# Patient Record
Sex: FEMALE | Race: WHITE | Employment: OTHER | URBAN - METROPOLITAN AREA
[De-identification: names, ages, dates, MRNs, and addresses within clinical notes are randomized per-mention and may not be internally consistent; named-entity substitution may affect disease eponyms.]

---

## 2018-04-23 ENCOUNTER — HOSPITAL ENCOUNTER (OUTPATIENT)
Dept: GENERAL RADIOLOGY | Age: 83
Discharge: HOME | End: 2018-04-23
Attending: FAMILY MEDICINE
Payer: MEDICARE

## 2018-04-23 DIAGNOSIS — M25.571 RIGHT ANKLE PAIN: ICD-10-CM

## 2018-04-23 PROCEDURE — 73610 X-RAY EXAM OF ANKLE: CPT

## 2019-01-28 ENCOUNTER — OFFICE VISIT (OUTPATIENT)
Dept: INTERNAL MEDICINE CLINIC | Age: 84
End: 2019-01-28

## 2019-01-28 VITALS
RESPIRATION RATE: 16 BRPM | HEIGHT: 63 IN | WEIGHT: 137.8 LBS | TEMPERATURE: 98.1 F | SYSTOLIC BLOOD PRESSURE: 122 MMHG | HEART RATE: 63 BPM | BODY MASS INDEX: 24.41 KG/M2 | OXYGEN SATURATION: 92 % | DIASTOLIC BLOOD PRESSURE: 74 MMHG

## 2019-01-28 DIAGNOSIS — R41.3 MEMORY LOSS OR IMPAIRMENT: ICD-10-CM

## 2019-01-28 DIAGNOSIS — K27.9 PUD (PEPTIC ULCER DISEASE): ICD-10-CM

## 2019-01-28 DIAGNOSIS — M16.0 ARTHRITIS OF BOTH HIPS: Primary | ICD-10-CM

## 2019-01-28 DIAGNOSIS — Z87.898 H/O: PITUITARY TUMOR: ICD-10-CM

## 2019-01-28 DIAGNOSIS — E78.5 DYSLIPIDEMIA: ICD-10-CM

## 2019-01-28 DIAGNOSIS — I10 HTN, GOAL BELOW 130/80: ICD-10-CM

## 2019-01-28 DIAGNOSIS — R01.1 HEART MURMUR: ICD-10-CM

## 2019-01-28 DIAGNOSIS — K21.9 GERD WITHOUT ESOPHAGITIS: ICD-10-CM

## 2019-01-28 DIAGNOSIS — E23.6 PITUITARY CYST (HCC): ICD-10-CM

## 2019-01-28 PROBLEM — D51.0 PERNICIOUS ANEMIA: Status: ACTIVE | Noted: 2019-01-28

## 2019-01-28 RX ORDER — METOPROLOL TARTRATE 25 MG/1
12.5 TABLET, FILM COATED ORAL 2 TIMES DAILY
COMMUNITY
Start: 2021-01-01 | End: 2021-01-01 | Stop reason: SDUPTHER

## 2019-01-28 RX ORDER — ASPIRIN 81 MG/1
81 TABLET ORAL DAILY
COMMUNITY

## 2019-01-28 RX ORDER — GABAPENTIN 100 MG/1
100 CAPSULE ORAL 3 TIMES DAILY
Qty: 30 CAP | Refills: 0
Start: 2019-01-28 | End: 2019-07-29 | Stop reason: SDUPTHER

## 2019-01-28 RX ORDER — GABAPENTIN 100 MG/1
2 CAPSULE ORAL 3 TIMES DAILY
Refills: 1 | COMMUNITY
Start: 2018-12-12 | End: 2019-01-28 | Stop reason: DRUGHIGH

## 2019-01-28 RX ORDER — DICLOFENAC SODIUM 10 MG/G
GEL TOPICAL AS NEEDED
COMMUNITY
Start: 2018-11-24 | End: 2019-03-25 | Stop reason: SDUPTHER

## 2019-01-28 RX ORDER — OMEPRAZOLE 20 MG/1
1 CAPSULE, DELAYED RELEASE ORAL DAILY
Refills: 5 | COMMUNITY
Start: 2019-01-06 | End: 2019-01-28 | Stop reason: SDUPTHER

## 2019-01-28 RX ORDER — CYANOCOBALAMIN 1000 UG/ML
1 INJECTION, SOLUTION INTRAMUSCULAR; SUBCUTANEOUS
Refills: 1 | COMMUNITY
Start: 2019-01-03 | End: 2019-11-04 | Stop reason: SDUPTHER

## 2019-01-28 RX ORDER — OMEPRAZOLE 20 MG/1
20 CAPSULE, DELAYED RELEASE ORAL DAILY
Qty: 30 CAP | Refills: 5 | Status: SHIPPED | OUTPATIENT
Start: 2019-01-28 | End: 2019-08-12 | Stop reason: SDUPTHER

## 2019-01-28 RX ORDER — MEMANTINE HYDROCHLORIDE 14 MG/1
1 CAPSULE, EXTENDED RELEASE ORAL DAILY
COMMUNITY
Start: 2019-01-01 | End: 2019-03-25 | Stop reason: DRUGHIGH

## 2019-01-28 RX ORDER — FENOFIBRATE 145 MG/1
1 TABLET, COATED ORAL DAILY
COMMUNITY
Start: 2018-01-30 | End: 2019-03-25 | Stop reason: ALTCHOICE

## 2019-01-28 RX ORDER — TRAMADOL HYDROCHLORIDE 50 MG/1
50 TABLET ORAL
Qty: 90 TAB | Refills: 2 | Status: SHIPPED | OUTPATIENT
Start: 2019-01-28 | End: 2019-04-29 | Stop reason: SDUPTHER

## 2019-01-28 RX ORDER — FLUOROURACIL 50 MG/G
1 CREAM TOPICAL 2 TIMES DAILY
Refills: 0 | COMMUNITY
Start: 2019-01-18 | End: 2021-01-01 | Stop reason: ALTCHOICE

## 2019-01-28 RX ORDER — AMLODIPINE AND OLMESARTAN MEDOXOMIL 10; 40 MG/1; MG/1
1 TABLET ORAL DAILY
COMMUNITY
Start: 2018-05-14 | End: 2019-11-04 | Stop reason: ALTCHOICE

## 2019-01-29 PROBLEM — H91.93 BILATERAL HEARING LOSS: Status: ACTIVE | Noted: 2019-01-29

## 2019-01-29 PROBLEM — R41.3 MEMORY LOSS OR IMPAIRMENT: Status: ACTIVE | Noted: 2019-01-29

## 2019-01-29 PROBLEM — K27.9 PUD (PEPTIC ULCER DISEASE): Status: ACTIVE | Noted: 2019-01-29

## 2019-01-29 PROBLEM — Z90.710 H/O: HYSTERECTOMY: Status: ACTIVE | Noted: 2019-01-29

## 2019-01-29 PROBLEM — E23.6 PITUITARY CYST (HCC): Status: ACTIVE | Noted: 2019-01-29

## 2019-01-29 LAB
ALBUMIN SERPL-MCNC: 4.1 G/DL (ref 3.2–4.6)
ALBUMIN/GLOB SERPL: 1.7 {RATIO} (ref 1.2–2.2)
ALP SERPL-CCNC: 49 IU/L (ref 39–117)
ALT SERPL-CCNC: 12 IU/L (ref 0–32)
AST SERPL-CCNC: 30 IU/L (ref 0–40)
BASOPHILS # BLD AUTO: 0 X10E3/UL (ref 0–0.2)
BASOPHILS NFR BLD AUTO: 1 %
BILIRUB SERPL-MCNC: 0.3 MG/DL (ref 0–1.2)
BUN SERPL-MCNC: 23 MG/DL (ref 10–36)
BUN/CREAT SERPL: 22 (ref 12–28)
CALCIUM SERPL-MCNC: 8.9 MG/DL (ref 8.7–10.3)
CHLORIDE SERPL-SCNC: 105 MMOL/L (ref 96–106)
CHOLEST SERPL-MCNC: 120 MG/DL (ref 100–199)
CO2 SERPL-SCNC: 20 MMOL/L (ref 20–29)
CREAT SERPL-MCNC: 1.04 MG/DL (ref 0.57–1)
EOSINOPHIL # BLD AUTO: 0.2 X10E3/UL (ref 0–0.4)
EOSINOPHIL NFR BLD AUTO: 3 %
ERYTHROCYTE [DISTWIDTH] IN BLOOD BY AUTOMATED COUNT: 14.6 % (ref 12.3–15.4)
GLOBULIN SER CALC-MCNC: 2.4 G/DL (ref 1.5–4.5)
GLUCOSE SERPL-MCNC: 84 MG/DL (ref 65–99)
HCT VFR BLD AUTO: 32.9 % (ref 34–46.6)
HDLC SERPL-MCNC: 27 MG/DL
HGB BLD-MCNC: 10.3 G/DL (ref 11.1–15.9)
IMM GRANULOCYTES # BLD AUTO: 0 X10E3/UL (ref 0–0.1)
IMM GRANULOCYTES NFR BLD AUTO: 0 %
INTERPRETATION, 910389: NORMAL
INTERPRETATION: NORMAL
LDLC SERPL CALC-MCNC: 59 MG/DL (ref 0–99)
LYMPHOCYTES # BLD AUTO: 1.2 X10E3/UL (ref 0.7–3.1)
LYMPHOCYTES NFR BLD AUTO: 21 %
MCH RBC QN AUTO: 31.3 PG (ref 26.6–33)
MCHC RBC AUTO-ENTMCNC: 31.3 G/DL (ref 31.5–35.7)
MCV RBC AUTO: 100 FL (ref 79–97)
MONOCYTES # BLD AUTO: 0.4 X10E3/UL (ref 0.1–0.9)
MONOCYTES NFR BLD AUTO: 6 %
NEUTROPHILS # BLD AUTO: 4.1 X10E3/UL (ref 1.4–7)
NEUTROPHILS NFR BLD AUTO: 69 %
PDF IMAGE, 910387: NORMAL
PLATELET # BLD AUTO: 337 X10E3/UL (ref 150–379)
POTASSIUM SERPL-SCNC: 5.1 MMOL/L (ref 3.5–5.2)
PROLACTIN SERPL-MCNC: 22.3 NG/ML (ref 4.8–23.3)
PROT SERPL-MCNC: 6.5 G/DL (ref 6–8.5)
RBC # BLD AUTO: 3.29 X10E6/UL (ref 3.77–5.28)
SODIUM SERPL-SCNC: 141 MMOL/L (ref 134–144)
T4 FREE SERPL-MCNC: 0.9 NG/DL (ref 0.82–1.77)
TRIGL SERPL-MCNC: 171 MG/DL (ref 0–149)
TSH SERPL DL<=0.005 MIU/L-ACNC: 1.26 UIU/ML (ref 0.45–4.5)
VLDLC SERPL CALC-MCNC: 34 MG/DL (ref 5–40)
WBC # BLD AUTO: 6 X10E3/UL (ref 3.4–10.8)

## 2019-01-29 NOTE — PROGRESS NOTES
HISTORY OF PRESENT ILLNESS  Ashley Davis is a 80 y.o. female. HPI  New to me and this practice, comes in to get established. Accompanied by daughter Nadine Morales, phone number [phone], who helps with history. Issues:  1. Arthritis pain, primarily in hips and knees. Had been on Tramadol, two to three a day, however was changed by previous physician to Gabapentin 200 mg three times a day. On this she is more sedated and would like to go back to Tramadol. 2. Short term memory loss, on Namenda XR 14 mg daily since January of 2018. 3. History of heart murmur. Sees Dr. Kaitlin Dias and has had an echo. No recent edema, syncope or significant shortness of breath. 4. History of pituitary surgery, benign cyst resected twice. No recent endocrine evaluation. 5. History of peptic ulcer disease. Cannot tolerated NSAIDs, including Celebrex in the past.  6. History of skin cancers, followed by Dr. Mathew Franco. Topical fluorouracil currently being used around mouth. 7. Occasional dizziness. 8. Hard of hearing. Does wear hearing aids    Preventive Care:  Tetanus given before 2000, Zostavax in 2013, Pneumovax in 2016. Social History:  She lives alone, but daughter Micheal Arteaga lives downstairs from her. She does not smoke. Alcohol -less than one glass per year. She is . Family History:  Father had heart disease and cancer. Mother had a ruptured aortic aneurysm. Review of Systems   Constitutional: Positive for malaise/fatigue. HENT: Positive for hearing loss. Eyes: Positive for blurred vision. Gastrointestinal: Positive for diarrhea. Genitourinary: Positive for frequency. Musculoskeletal: Positive for joint pain. Neurological: Positive for dizziness. Psychiatric/Behavioral: Positive for memory loss. The patient has insomnia. All other systems reviewed and are negative. Physical Exam   Constitutional: She is oriented to person, place, and time.  She appears well-developed and well-nourished. HENT:   Head: Normocephalic and atraumatic. Right Ear: External ear normal.   Left Ear: External ear normal.   Mouth/Throat: Oropharynx is clear and moist.   Hearing aides in place   Eyes: Conjunctivae are normal.   Neck: Normal range of motion. Neck supple. Carotid bruit is not present. No thyromegaly present. Cardiovascular: Normal rate, regular rhythm, S1 normal, S2 normal and intact distal pulses. Murmur heard. Pulmonary/Chest: Effort normal and breath sounds normal. No respiratory distress. She has no wheezes. She has no rales. Abdominal: Soft. She exhibits no distension and no mass. There is no tenderness. Musculoskeletal: She exhibits no edema. Able to get on and off exam table slowly uses a walker   Neurological: She is alert and oriented to person, place, and time. Skin: No rash noted. Psychiatric: She has a normal mood and affect. Her behavior is normal.   Nursing note and vitals reviewed. ASSESSMENT and PLAN  Diagnoses and all orders for this visit:    1. Arthritis of both hips-resuem ultram and begin to titrate off neurontin dec to 1 tid and then 1 qhs   -     traMADol (ULTRAM) 50 mg tablet; Take 1 Tab by mouth every eight (8) hours as needed for Pain. Max Daily Amount: 150 mg.  -     gabapentin (NEURONTIN) 100 mg capsule; Take 1 Cap by mouth three (3) times daily. 2. Memory loss or impairment    3. Heart murmur  Get echo report  4. HTN, goal below 130/80  Stable on meds  5. GERD without esophagitis  -     omeprazole (PRILOSEC) 20 mg capsule; Take 1 Cap by mouth daily. 6. H/O: pituitary tumor  -     TSH 3RD GENERATION  -     T4, FREE  -     PROLACTIN  -     CBC WITH AUTOMATED DIFF    7. Dyslipidemia  -     METABOLIC PANEL, COMPREHENSIVE  -     LIPID PANEL    8. Pituitary cyst ([facility] [address]    9.  PUD (peptic ulcer disease)    Other orders  -     CVD REPORT  -     CKD REPORT

## 2019-03-25 ENCOUNTER — OFFICE VISIT (OUTPATIENT)
Dept: INTERNAL MEDICINE CLINIC | Age: 84
End: 2019-03-25

## 2019-03-25 VITALS
BODY MASS INDEX: 23.92 KG/M2 | TEMPERATURE: 98.2 F | SYSTOLIC BLOOD PRESSURE: 119 MMHG | HEIGHT: 63 IN | DIASTOLIC BLOOD PRESSURE: 70 MMHG | HEART RATE: 65 BPM | WEIGHT: 135 LBS | RESPIRATION RATE: 16 BRPM | OXYGEN SATURATION: 93 %

## 2019-03-25 DIAGNOSIS — R41.3 MEMORY LOSS OR IMPAIRMENT: Primary | ICD-10-CM

## 2019-03-25 DIAGNOSIS — E78.5 DYSLIPIDEMIA, GOAL LDL BELOW 100: ICD-10-CM

## 2019-03-25 DIAGNOSIS — M16.0 ARTHRITIS OF BOTH HIPS: ICD-10-CM

## 2019-03-25 RX ORDER — CLOBETASOL PROPIONATE 0.46 MG/ML
SOLUTION TOPICAL
Refills: 2 | COMMUNITY
Start: 2019-02-19

## 2019-03-25 RX ORDER — MEMANTINE HYDROCHLORIDE 21 MG/1
21 CAPSULE, EXTENDED RELEASE ORAL DAILY
Qty: 90 CAP | Refills: 2 | Status: SHIPPED | OUTPATIENT
Start: 2019-03-25 | End: 2019-12-16 | Stop reason: SDUPTHER

## 2019-03-25 RX ORDER — DICLOFENAC SODIUM 10 MG/G
GEL TOPICAL 4 TIMES DAILY
Qty: 100 G | Refills: 4 | Status: SHIPPED | OUTPATIENT
Start: 2019-03-25 | End: 2019-04-26 | Stop reason: SDUPTHER

## 2019-03-25 RX ORDER — LIDOCAINE HYDROCHLORIDE 20 MG/ML
1 SOLUTION ORAL; TOPICAL
Refills: 1 | COMMUNITY
Start: 2019-03-08 | End: 2020-09-28

## 2019-03-25 NOTE — PROGRESS NOTES
HISTORY OF PRESENT ILLNESS  Pieter Shaw is a 80 y.o. female. HPI  Follow up after establishing with me. Comes in with daughter, Allie Perdue, who helps with history. Issues:  1. Dyslipidemia. Has been on Tricor. I think given age, little benefit. Will stop this. 2. Memory loss. Tolerating Namenda XR 14. Will titrate to the 21 mg for more long term benefit. 3. Back pain. Using two Gabapentin at night, 200 mg, and two Tramadol in the day, as well as occasional Voltaren Gel. We discussed Lidocaine patches OTC as well to help with her back. Review of Systems   Constitutional: Negative for chills, fever and weight loss. Respiratory: Negative for cough, shortness of breath and wheezing. Cardiovascular: Negative for chest pain, palpitations, orthopnea, leg swelling and PND. Gastrointestinal: Negative for constipation, heartburn and nausea. Musculoskeletal: Positive for back pain. Negative for falls and myalgias. Neurological: Negative for dizziness and headaches. Psychiatric/Behavioral: Positive for memory loss. Physical Exam   Constitutional: She appears well-developed and well-nourished. HENT:   Head: Normocephalic and atraumatic. Neck: Normal range of motion. Neck supple. Carotid bruit is not present. No thyromegaly present. Cardiovascular: Normal rate, regular rhythm, S1 normal, S2 normal, normal heart sounds and intact distal pulses. No murmur heard. Pulmonary/Chest: Effort normal and breath sounds normal. No respiratory distress. She has no wheezes. She has no rales. Musculoskeletal: She exhibits edema (trace). Neurological: She is alert. Psychiatric: She has a normal mood and affect. Her behavior is normal.   Nursing note and vitals reviewed. ASSESSMENT and PLAN  Diagnoses and all orders for this visit:    1. Memory loss or impairment  -     memantine ER (NAMENDA XR) 21 mg capsule; Take 1 Cap by mouth daily.     2. Arthritis of both hips  -     diclofenac (VOLTAREN) 1 % gel; Apply  to affected area four (4) times daily.     3. Dyslipidemia, goal LDL below 100      the following changes in treatment are made: stop tricor inc namenda use lidocaine patches

## 2019-04-25 ENCOUNTER — TELEPHONE (OUTPATIENT)
Dept: INTERNAL MEDICINE CLINIC | Age: 84
End: 2019-04-25

## 2019-04-25 DIAGNOSIS — M16.0 ARTHRITIS OF BOTH HIPS: ICD-10-CM

## 2019-04-25 NOTE — TELEPHONE ENCOUNTER
Patient's daughter, Cedrick Hernandez, called to request a new script for Voltaren gel as it is normally written for a 3 month supply to pay the copy once. The recent order was only 1 tube instead of 9 tubes. Can a new script be sent to the mail order pharmacy? Please give daughter update at [phone]. Thanks.

## 2019-04-26 RX ORDER — DICLOFENAC SODIUM 10 MG/G
2 GEL TOPICAL 4 TIMES DAILY
Qty: 800 G | Refills: 1 | Status: SHIPPED | OUTPATIENT
Start: 2019-04-26 | End: 2020-01-29

## 2019-04-26 NOTE — TELEPHONE ENCOUNTER
[facility] Notified patient's daughter of this who voiced understanding.

## 2019-04-29 DIAGNOSIS — M16.0 ARTHRITIS OF BOTH HIPS: ICD-10-CM

## 2019-04-30 ENCOUNTER — TELEPHONE (OUTPATIENT)
Dept: INTERNAL MEDICINE CLINIC | Age: 84
End: 2019-04-30

## 2019-04-30 RX ORDER — TRAMADOL HYDROCHLORIDE 50 MG/1
TABLET ORAL
Qty: 90 TAB | Refills: 2 | OUTPATIENT
Start: 2019-04-30 | End: 2019-06-26 | Stop reason: SDUPTHER

## 2019-04-30 NOTE — TELEPHONE ENCOUNTER
Sarah sousa Patient's [facility]  in [place] states patient script for  Tramadol has instructions stating patient can take 1 tab every 8 hrs but states per patient PCP  ok'd for her to take up to 4 tabs per day , requesting a new script be sent to them if patient is to take up to 4 tabs daily.

## 2019-04-30 NOTE — TELEPHONE ENCOUNTER
Returned call to patient's daughter. Informed her PCP okayed her mother to take up to 3 tabs daily, not 4. Patient's daughter stated she got confused at the pharmacy & understands it is a max of 3 tabs a day. Next fill of Tramadol cannot be picked up until may 8th as last fill was picked up April 10th. Patient's daughter feels the pharmacy did not give her mother the full prescription on April 10th because her mother will be out of meds by May. Advised she will need to contact the pharmacy to discuss as this would be an error on their part.

## 2019-06-26 ENCOUNTER — OFFICE VISIT (OUTPATIENT)
Dept: INTERNAL MEDICINE CLINIC | Age: 84
End: 2019-06-26

## 2019-06-26 VITALS
TEMPERATURE: 98.2 F | SYSTOLIC BLOOD PRESSURE: 139 MMHG | OXYGEN SATURATION: 94 % | RESPIRATION RATE: 16 BRPM | DIASTOLIC BLOOD PRESSURE: 75 MMHG | HEIGHT: 63 IN | HEART RATE: 70 BPM | WEIGHT: 132.6 LBS | BODY MASS INDEX: 23.5 KG/M2

## 2019-06-26 DIAGNOSIS — R41.3 MEMORY LOSS OR IMPAIRMENT: ICD-10-CM

## 2019-06-26 DIAGNOSIS — I10 HTN, GOAL BELOW 130/80: Primary | ICD-10-CM

## 2019-06-26 DIAGNOSIS — M16.0 ARTHRITIS OF BOTH HIPS: ICD-10-CM

## 2019-06-26 RX ORDER — DULOXETIN HYDROCHLORIDE 20 MG/1
20 CAPSULE, DELAYED RELEASE ORAL DAILY
Qty: 30 CAP | Refills: 2 | Status: SHIPPED | OUTPATIENT
Start: 2019-06-26 | End: 2019-08-28 | Stop reason: ALTCHOICE

## 2019-06-26 RX ORDER — TRAMADOL HYDROCHLORIDE 50 MG/1
50 TABLET ORAL
Qty: 90 TAB | Refills: 2 | Status: SHIPPED | OUTPATIENT
Start: 2019-07-30 | End: 2019-08-28 | Stop reason: SDUPTHER

## 2019-06-27 NOTE — PROGRESS NOTES
HISTORY OF PRESENT ILLNESS  Muna Simmons is a 80 y.o. female. HPI  Follow up, med check, accompanied by her daughter. Issues:  1. Ongoing arthritis pain, particularly in hips and back. She is using about two Tramadol a day. She did not feel that the Lidoderm patch was helpful. They are interested in Cymbalta, which they have read about as an option for helping with chronic pain. Did discuss risks and benefits and will start low dose. 2. Hypertension, remains on Amlodipine, Olmesartan 10/40, with Metoprolol 25 b.i.d., and BP looks good. No chest pain. 3. Memory loss, primarily short term. She has tolerated the increased Namenda 21 and does not wish to escalate dose further at this time. She is off of the Fenofibrate. Review of Systems   Constitutional: Negative for chills, fever and weight loss. Respiratory: Negative for cough, shortness of breath and wheezing. Cardiovascular: Negative for chest pain, palpitations, orthopnea, leg swelling and PND. Gastrointestinal: Negative for abdominal pain, constipation, diarrhea, heartburn and nausea. Musculoskeletal: Positive for back pain and joint pain. Negative for falls and myalgias. Neurological: Negative for dizziness and headaches. Psychiatric/Behavioral: Positive for memory loss. Negative for depression. Physical Exam   Constitutional: She is oriented to person, place, and time. She appears well-developed and well-nourished. HENT:   Head: Normocephalic and atraumatic. Right Ear: External ear normal.   Left Ear: External ear normal.   Mouth/Throat: Oropharynx is clear and moist.   Eyes: Pupils are equal, round, and reactive to light. Neck: Normal range of motion. Neck supple. Carotid bruit is not present. No thyromegaly present. Cardiovascular: Normal rate, regular rhythm, S1 normal, S2 normal, normal heart sounds and intact distal pulses. No murmur heard.   Pulmonary/Chest: Effort normal and breath sounds normal. No respiratory distress. She has no wheezes. She has no rales. Musculoskeletal: She exhibits no edema. Neurological: She is alert and oriented to person, place, and time. Skin: No rash noted. Psychiatric: She has a normal mood and affect. Her behavior is normal.   Nursing note and vitals reviewed. ASSESSMENT and PLAN  Diagnoses and all orders for this visit:    1. HTN, goal below 130/80-stable on meds    2. Arthritis of both hips  -     traMADol (ULTRAM) 50 mg tablet; Take 1 Tab by mouth every eight (8) hours as needed for Pain for up to 30 days. Max Daily Amount: 150 mg.  -     DULoxetine (CYMBALTA) 20 mg capsule; Take 1 Cap by mouth daily.     3. Memory loss or impairment-cont namenda      the following changes in treatment are made: start on cymbalta and appt in 3 mo  Current pain 5-10 level will see if we can dec overall level  Side effects possible reviewed in detail

## 2019-07-29 DIAGNOSIS — M16.0 ARTHRITIS OF BOTH HIPS: ICD-10-CM

## 2019-07-29 NOTE — TELEPHONE ENCOUNTER
----- Message from Ronan Hackett sent at 7/29/2019  1:00 PM EDT -----  Regarding: Dr. Juana Huang  Env Medication Refill    Caller (if not patient): Abena Deshpande       Relationship of caller (if not patient): Daughter      Best contact number(s): [phone]      Name of medication and dosage if known: Gabapentin 100mg       Is patient out of this medication (yes/no): Yes since 07/24/2019      Pharmacy name: [facility] listed in chart? (yes/no): Yes   Pharmacy phone number: [phone]      Details to clarify the request:      Ronan Hackett

## 2019-07-30 RX ORDER — GABAPENTIN 100 MG/1
200 CAPSULE ORAL
Qty: 60 CAP | Refills: 2 | OUTPATIENT
Start: 2019-07-30 | End: 2019-11-04 | Stop reason: SDUPTHER

## 2019-08-12 DIAGNOSIS — K21.9 GERD WITHOUT ESOPHAGITIS: ICD-10-CM

## 2019-08-12 RX ORDER — OMEPRAZOLE 20 MG/1
CAPSULE, DELAYED RELEASE ORAL
Qty: 30 CAP | Refills: 5 | Status: SHIPPED | OUTPATIENT
Start: 2019-08-12 | End: 2020-02-22

## 2019-08-26 ENCOUNTER — TELEPHONE (OUTPATIENT)
Dept: INTERNAL MEDICINE CLINIC | Age: 84
End: 2019-08-26

## 2019-08-26 NOTE — TELEPHONE ENCOUNTER
Notified Alayna Agustin PCP needs to evaluate her mother's back pain before providing orders for PT. Alayna Agustin agreed & appt scheduled for 8/28 at 10:15 with PCP.

## 2019-08-28 ENCOUNTER — OFFICE VISIT (OUTPATIENT)
Dept: INTERNAL MEDICINE CLINIC | Age: 84
End: 2019-08-28

## 2019-08-28 VITALS
DIASTOLIC BLOOD PRESSURE: 67 MMHG | OXYGEN SATURATION: 93 % | WEIGHT: 133 LBS | BODY MASS INDEX: 23.57 KG/M2 | TEMPERATURE: 98.1 F | HEIGHT: 63 IN | RESPIRATION RATE: 16 BRPM | HEART RATE: 61 BPM | SYSTOLIC BLOOD PRESSURE: 108 MMHG

## 2019-08-28 DIAGNOSIS — R41.3 MEMORY LOSS OR IMPAIRMENT: ICD-10-CM

## 2019-08-28 DIAGNOSIS — M16.0 ARTHRITIS OF BOTH HIPS: Primary | ICD-10-CM

## 2019-08-28 DIAGNOSIS — I10 HTN, GOAL BELOW 130/80: ICD-10-CM

## 2019-08-28 RX ORDER — TRAMADOL HYDROCHLORIDE 50 MG/1
50 TABLET ORAL
Qty: 90 TAB | Refills: 2 | Status: SHIPPED | OUTPATIENT
Start: 2019-08-28 | End: 2020-02-26

## 2019-08-28 NOTE — PROGRESS NOTES
HISTORY OF PRESENT ILLNESS  Kash Carlos is a 80 y.o. female. HPI  Follow up with her daughter. She tried Cymbalta briefly, but did not like it, felt it was not helping, so stopped it. She is now using Tramadol, two a day, but would like to try three a day. The order has been written this way. She is not getting constipated. She is still on Gabapentin 200 mg only at night. Aware of sedation. Her BP is well controlled on the current Amlodipine, Olmesartan combo with Metoprolol. She is not having cardiac symptoms. She is enjoying coloring. Review of Systems   Constitutional: Negative for chills, fever and weight loss. Respiratory: Negative for cough, shortness of breath and wheezing. Cardiovascular: Negative for chest pain, palpitations, orthopnea, leg swelling and PND. Gastrointestinal: Negative for heartburn and nausea. Musculoskeletal: Positive for joint pain. Negative for falls and myalgias. Neurological: Negative for dizziness and headaches. Psychiatric/Behavioral: Positive for memory loss. Physical Exam   Constitutional: She appears well-developed and well-nourished. HENT:   Head: Normocephalic and atraumatic. Neck: Normal range of motion. Neck supple. Carotid bruit is not present. No thyromegaly present. Cardiovascular: Normal rate, regular rhythm, S1 normal, S2 normal, normal heart sounds and intact distal pulses. No murmur heard. Pulmonary/Chest: Effort normal and breath sounds normal. No respiratory distress. She has no wheezes. She has no rales. Musculoskeletal: She exhibits no edema. Neurological: She is alert. Psychiatric: She has a normal mood and affect. Her behavior is normal.   Nursing note and vitals reviewed. ASSESSMENT and PLAN  Diagnoses and all orders for this visit:    1. Arthritis of both hips  -     traMADol (ULTRAM) 50 mg tablet; Take 1 Tab by mouth every eight (8) hours as needed for Pain for up to 30 days.  Max Daily Amount: 150 mg.    2. Memory loss or impairment    3.  HTN, goal below 130/80    cpnt bp meds  appt in 3mo

## 2019-11-04 ENCOUNTER — OFFICE VISIT (OUTPATIENT)
Dept: INTERNAL MEDICINE CLINIC | Age: 84
End: 2019-11-04

## 2019-11-04 VITALS
TEMPERATURE: 98.8 F | WEIGHT: 134 LBS | RESPIRATION RATE: 16 BRPM | SYSTOLIC BLOOD PRESSURE: 154 MMHG | OXYGEN SATURATION: 93 % | HEIGHT: 63 IN | BODY MASS INDEX: 23.74 KG/M2 | DIASTOLIC BLOOD PRESSURE: 81 MMHG | HEART RATE: 71 BPM

## 2019-11-04 DIAGNOSIS — R41.3 MEMORY LOSS OR IMPAIRMENT: ICD-10-CM

## 2019-11-04 DIAGNOSIS — M54.9 CHRONIC BACK PAIN, UNSPECIFIED BACK LOCATION, UNSPECIFIED BACK PAIN LATERALITY: ICD-10-CM

## 2019-11-04 DIAGNOSIS — G89.29 CHRONIC BACK PAIN, UNSPECIFIED BACK LOCATION, UNSPECIFIED BACK PAIN LATERALITY: ICD-10-CM

## 2019-11-04 DIAGNOSIS — I10 HTN, GOAL BELOW 130/80: Primary | ICD-10-CM

## 2019-11-04 DIAGNOSIS — K27.9 PUD (PEPTIC ULCER DISEASE): ICD-10-CM

## 2019-11-04 DIAGNOSIS — D51.0 PERNICIOUS ANEMIA: ICD-10-CM

## 2019-11-04 DIAGNOSIS — M16.0 ARTHRITIS OF BOTH HIPS: ICD-10-CM

## 2019-11-04 DIAGNOSIS — G89.29 OTHER CHRONIC PAIN: ICD-10-CM

## 2019-11-04 DIAGNOSIS — Z23 ENCOUNTER FOR IMMUNIZATION: ICD-10-CM

## 2019-11-04 RX ORDER — AMLODIPINE BESYLATE 10 MG/1
10 TABLET ORAL DAILY
Qty: 30 TAB | Refills: 4 | Status: SHIPPED | OUTPATIENT
Start: 2019-11-04 | End: 2020-04-13 | Stop reason: SDUPTHER

## 2019-11-04 RX ORDER — GABAPENTIN 100 MG/1
200 CAPSULE ORAL
Qty: 60 CAP | Refills: 5 | Status: SHIPPED | OUTPATIENT
Start: 2019-11-04 | End: 2019-11-25 | Stop reason: SDUPTHER

## 2019-11-04 RX ORDER — OLMESARTAN MEDOXOMIL 40 MG/1
40 TABLET ORAL DAILY
Qty: 30 TAB | Refills: 5 | Status: SHIPPED | OUTPATIENT
Start: 2019-11-04 | End: 2020-04-13 | Stop reason: SDUPTHER

## 2019-11-04 RX ORDER — CYANOCOBALAMIN 1000 UG/ML
1000 INJECTION, SOLUTION INTRAMUSCULAR; SUBCUTANEOUS
Qty: 1 VIAL | Refills: 5
Start: 2019-11-04 | End: 2019-11-08 | Stop reason: SDUPTHER

## 2019-11-04 NOTE — Clinical Note
She needs home pt for back pain and prefers [facility] which has worked with her in past thanks

## 2019-11-04 NOTE — PROGRESS NOTES
HISTORY OF PRESENT ILLNESS  Dee Dee Lucas is a 80 y.o. female. HPI  Seen accompanied by her daughter, who helps with history as she does have memory impairment. Issues:    1. Dizziness. Has been using Meclizine daily. She has been apparently off of her Selvin, (Amlodipine/Benicar combination), for several weeks and blood pressure has been running high in my office. She is not having chest pain or shortness of breath. We are going to resume the blood pressure med. 2. Chronic back pain, seems to be progressing and she is less mobile. She is now willing to try PT at home. Has worked with [facility]. Needs refill on the Gabapentin 200 at night and uses Tramadol in the day with Tylenol. 3. Pernicious anemia. Needs refill on her B12 shot. Review of Systems   Constitutional: Positive for malaise/fatigue. Negative for chills, fever and weight loss. Respiratory: Negative for cough, shortness of breath and wheezing. Cardiovascular: Negative for chest pain, palpitations, orthopnea, leg swelling and PND. Gastrointestinal: Negative for heartburn and nausea. Musculoskeletal: Positive for back pain and joint pain. Negative for falls and myalgias. Neurological: Positive for dizziness. Negative for sensory change, focal weakness and headaches. Psychiatric/Behavioral: Positive for memory loss. Physical Exam   Constitutional: She appears well-developed and well-nourished. In a wheelchair frail in nad   HENT:   Head: Normocephalic and atraumatic. Neck: Normal range of motion. Neck supple. Carotid bruit is not present. No thyromegaly present. Cardiovascular: Normal rate, regular rhythm, S1 normal, S2 normal, normal heart sounds and intact distal pulses. No murmur heard. Pulmonary/Chest: Effort normal and breath sounds normal. No respiratory distress. She has no wheezes. She has no rales. Musculoskeletal: She exhibits no edema. Neurological: She is alert. Skin: No rash noted. Psychiatric: She has a normal mood and affect. Her behavior is normal.   Nursing note and vitals reviewed. ASSESSMENT and PLAN  Diagnoses and all orders for this visit:    1. HTN, goal below 130/80  -     amLODIPine (NORVASC) 10 mg tablet; Take 1 Tab by mouth daily. -     olmesartan (BENICAR) 40 mg tablet; Take 1 Tab by mouth daily. 2. Memory loss or impairment-cont namenda    3. PUD (peptic ulcer disease)    4. Other chronic pain-refer for home pt with [facility]    5. Arthritis of both hips  -     gabapentin (NEURONTIN) 100 mg capsule; Take 2 Caps by mouth nightly. Max Daily Amount: 200 mg.    6. Pernicious anemia  -     cyanocobalamin (VITAMIN B12) 1,000 mcg/mL injection; 1 mL by IntraMUSCular route every seven (7) days.

## 2019-11-08 DIAGNOSIS — D51.0 PERNICIOUS ANEMIA: ICD-10-CM

## 2019-11-08 RX ORDER — CYANOCOBALAMIN 1000 UG/ML
1000 INJECTION, SOLUTION INTRAMUSCULAR; SUBCUTANEOUS
Qty: 1 VIAL | Refills: 5 | Status: SHIPPED | OUTPATIENT
Start: 2019-11-08 | End: 2021-01-01 | Stop reason: SDUPTHER

## 2019-11-08 NOTE — TELEPHONE ENCOUNTER
Patient's daughter states [facility] has not received her b12 inj refill, requesting the script be re-sent.

## 2019-11-21 ENCOUNTER — TELEPHONE (OUTPATIENT)
Dept: INTERNAL MEDICINE CLINIC | Age: 84
End: 2019-11-21

## 2019-11-21 DIAGNOSIS — M62.838 MUSCLE SPASM: Primary | ICD-10-CM

## 2019-11-21 NOTE — TELEPHONE ENCOUNTER
----- Message from Vandana Alcocer sent at 11/21/2019  2:45 PM EST -----  Regarding: Dr. Hedrick Co: [phone]  Caller's first and last name and relationship (if not the patient): [address] [place] [address],[address] [place] [address] contact number(s): [phone]  Whose call is being returned: n/a  Details to clarify the request:  Pt would like nurse to give her a call. Physical therapist just left and Pt is having muscle spasms and would like to take Gabapentin more or if she can take a muscle relaxer.

## 2019-11-21 NOTE — TELEPHONE ENCOUNTER
Pt's daughter states Pt took gabapentin 100 mg at 1pm today d/t worsening muscle spasms. She was not able to participate in PT today. Left detailed VM advising Ms. Preston Cruz as per Dr Fabrice Mancera that Pt can take Tramadol if she hasn't taken it today and may take gabapentin 200 mg tonight as it can be taken up to 3x daily. Also recommended applying heat. Advised Ms. Preston Cruz message would be forwarded to Dr Mikey Glynn and her nurse to f/u tomorrow.

## 2019-11-22 RX ORDER — TIZANIDINE 2 MG/1
2 TABLET ORAL
Qty: 25 TAB | Refills: 0 | Status: SHIPPED | OUTPATIENT
Start: 2019-11-22 | End: 2019-11-25 | Stop reason: ALTCHOICE

## 2019-11-22 NOTE — TELEPHONE ENCOUNTER
Rosemarie states pt spasms are still severe after taking both medications. Requesting muscle relaxers as they have helped before. Thanks.

## 2019-11-22 NOTE — TELEPHONE ENCOUNTER
Reached out to Surjit, patient's daughter, to check on her mother's status. Rosemarie stated her mother has just woke up so she will call back with a report on how her mother is feeling.

## 2019-11-22 NOTE — TELEPHONE ENCOUNTER
Notified Rosemarie PCP approved a Zanaflex prescription for her mother to use at bedtime as needed. Warned against the sedating effects the med can have which can increase her risk for falls. Advised can still use the Gabapentin as well. Rosemarie voiced understanding.

## 2019-11-22 NOTE — TELEPHONE ENCOUNTER
So I sent in tizanidine to use qhs prn-warn dt of risk of sedation and falls with muscle relaxers and see me in dec as planned to eval response

## 2019-11-24 ENCOUNTER — TELEPHONE (OUTPATIENT)
Dept: INTERNAL MEDICINE CLINIC | Age: 84
End: 2019-11-24

## 2019-11-25 ENCOUNTER — TELEPHONE (OUTPATIENT)
Dept: INTERNAL MEDICINE CLINIC | Age: 84
End: 2019-11-25

## 2019-11-25 ENCOUNTER — OFFICE VISIT (OUTPATIENT)
Dept: INTERNAL MEDICINE CLINIC | Age: 84
End: 2019-11-25

## 2019-11-25 VITALS
SYSTOLIC BLOOD PRESSURE: 188 MMHG | WEIGHT: 126.2 LBS | BODY MASS INDEX: 22.36 KG/M2 | HEART RATE: 68 BPM | OXYGEN SATURATION: 95 % | RESPIRATION RATE: 16 BRPM | TEMPERATURE: 97.7 F | HEIGHT: 63 IN | DIASTOLIC BLOOD PRESSURE: 79 MMHG

## 2019-11-25 DIAGNOSIS — I10 HTN, GOAL BELOW 130/80: Primary | ICD-10-CM

## 2019-11-25 DIAGNOSIS — M16.0 ARTHRITIS OF BOTH HIPS: ICD-10-CM

## 2019-11-25 DIAGNOSIS — M48.061 FORAMINAL STENOSIS OF LUMBAR REGION: ICD-10-CM

## 2019-11-25 DIAGNOSIS — M62.838 MUSCLE SPASM: ICD-10-CM

## 2019-11-25 DIAGNOSIS — R41.3 MEMORY LOSS OR IMPAIRMENT: ICD-10-CM

## 2019-11-25 RX ORDER — PREDNISONE 20 MG/1
20 TABLET ORAL
Qty: 7 TAB | Refills: 0 | Status: SHIPPED | OUTPATIENT
Start: 2019-11-25 | End: 2019-12-16 | Stop reason: ALTCHOICE

## 2019-11-25 RX ORDER — GABAPENTIN 100 MG/1
100 CAPSULE ORAL 2 TIMES DAILY
Qty: 60 CAP | Refills: 3 | Status: SHIPPED | OUTPATIENT
Start: 2019-11-25 | End: 2019-11-26 | Stop reason: SDUPTHER

## 2019-11-25 NOTE — PROGRESS NOTES
HISTORY OF PRESENT ILLNESS  Caleb Choudhary is a 80 y.o. female. DEE Hernadez is worked in because of back pain. It is bilateral in thoracic and lumbar region and some into left hip. No weakness in foot, no falls. She is accompanied by her daughter. Over the weekend because of increasing pain she has been using two Gabapentin 100 mg t.i.d. with Tramadol t.i.d., and also taking Tizanidine every six hours. Does not feel any of this has really helped with her pain. She has not had fevers, chills or dysuria. She does have memory impairment. Family is helping her with her meds. She has recently started with home PT and daughter notes that after some exercises the pain seems to have escalated. Home PT has just started. They are using a topical Voltaren Gel as well, which Ms. Hollins says helps. She stopped her blood pressure pills because her stomach was bothering her and blood pressure is quite high today. She is not having chest pain. She does remain on Namenda. Review of Systems   Constitutional: Negative for chills, diaphoresis, fever and weight loss. Cardiovascular: Negative for chest pain. Gastrointestinal: Negative for abdominal pain, blood in stool, constipation, nausea and vomiting. Genitourinary: Negative for dysuria, frequency and hematuria. Musculoskeletal: Positive for back pain and joint pain. Negative for falls. Neurological: Negative for sensory change and focal weakness. Psychiatric/Behavioral: Positive for memory loss. Physical Exam  Vitals signs and nursing note reviewed. Constitutional:       Appearance: She is well-developed. Comments: Fragile and elderly in chair able to stand    HENT:      Head: Normocephalic and atraumatic. Neck:      Musculoskeletal: Normal range of motion and neck supple. Thyroid: No thyromegaly. Vascular: No carotid bruit. Cardiovascular:      Rate and Rhythm: Normal rate and regular rhythm.       Heart sounds: Normal heart sounds, S1 normal and S2 normal. No murmur. Pulmonary:      Effort: Pulmonary effort is normal. No respiratory distress. Breath sounds: Normal breath sounds. No wheezing or rales. Musculoskeletal:      Comments: Pain in bilat thoracic and lumbar regions able to stand   Skin:     General: Skin is warm and dry. Findings: No rash. Neurological:      Mental Status: She is alert. Psychiatric:         Behavior: Behavior normal.         ASSESSMENT and PLAN  Diagnoses and all orders for this visit:    1. HTN, goal below 130/80-off meds and bp high strongly advised resume her bp meds appt in 2 weeks    2. Muscle spasm-I am worried re her fall  Risk with current combination of meds  Stop tizanidine and use ultram only prn and use prednisone daily for inflammation  Discussed I think the underlying djd in spine is driving pain   Cont massage but hold on active exercises with pt    3. Memory loss or impairment    4. Foraminal stenosis of lumbar region  -     predniSONE (DELTASONE) 20 mg tablet; Take 20 mg by mouth daily (with breakfast). -     gabapentin (NEURONTIN) 100 mg capsule; Take 1 Cap by mouth two (2) times a day.  Max Daily Amount: 200 mg.    5. Arthritis of both hips    Discussed all meds with her dt

## 2019-11-25 NOTE — TELEPHONE ENCOUNTER
Received and returned page from daughter 10:44am.  Reports that the tizanidine that was called in earlier this week is not doing enough for her mother's pain. Reports that she previously was given valium, but I told her that I did not feel comfortable writing for that over the phone. Recommended increasing tizanidine up to 3 times a day, being mindful of sedation. If it is still not controlled, could go up to 4mg each dose. Instructed to call back if pain still not well controlled. Daughter verbalized understanding.

## 2019-11-25 NOTE — TELEPHONE ENCOUNTER
Patient's daughter returned call to the nurse , states patient has decided to lay down for a little while and if her pain does not subside then they are leaning towards taking her to the [facility] ER , believes PCP will send her to the ER and wants to avoid two visits if possible.

## 2019-11-25 NOTE — TELEPHONE ENCOUNTER
Reached out to Rosemarie to check on the status of her mother. She states her mother has not yet gotten up so she will call back with a full report. She did note that the on-call provider advised to increase the Zanaflex to 4 mg & this seem to help some with the pain. Rosemarie also advised per MD Burton's instructions from last week her mother has been taking Gabapentin 100 mg 2 pills TID. The current Rx on file is written for 2 pills at night. Rosemarie advised she may need a new Rx sent to the pharmacy as her current supply is getting low.

## 2019-11-25 NOTE — TELEPHONE ENCOUNTER
V/m left for Cindy to follow-up & see how her mother was doing. Asked If she could leave a message & update us on her mother's pain.

## 2019-11-25 NOTE — PATIENT INSTRUCTIONS
Please stop the tizanidine.   Use the gabapentin 100 mg twice a day  Add prednisone 20 mg in am with breakfast  Ultram only use as needed if tylenol does not work

## 2019-11-26 DIAGNOSIS — M48.061 FORAMINAL STENOSIS OF LUMBAR REGION: ICD-10-CM

## 2019-11-26 RX ORDER — GABAPENTIN 100 MG/1
200 CAPSULE ORAL
Qty: 42 CAP | Refills: 0 | Status: SHIPPED | OUTPATIENT
Start: 2019-11-26 | End: 2019-12-16 | Stop reason: SDUPTHER

## 2019-11-26 NOTE — TELEPHONE ENCOUNTER
Patient's daughter called to report that patient was advised by covering provider to double up on Neurontin medication. Daughter reports that patient is completely out of medication and unable to fill same strength for the next 7 days. Daughter is requesting a 7 day supply for patient of another strength to help bridge medication and get through the holiday. Please call daughter  To advise.    [phone]    [facility]  [phone]

## 2019-12-10 DIAGNOSIS — M62.838 MUSCLE SPASM: ICD-10-CM

## 2019-12-10 RX ORDER — TIZANIDINE 2 MG/1
2 TABLET ORAL
Qty: 25 TAB | Refills: 0 | OUTPATIENT
Start: 2019-12-10

## 2019-12-10 NOTE — TELEPHONE ENCOUNTER
I denied tizanidine because we stopped it at last visit and jefferson for neurontin and ultram in place she felt the tizanidine did nto help

## 2019-12-16 ENCOUNTER — OFFICE VISIT (OUTPATIENT)
Dept: INTERNAL MEDICINE CLINIC | Age: 84
End: 2019-12-16

## 2019-12-16 VITALS
WEIGHT: 130 LBS | HEIGHT: 63 IN | DIASTOLIC BLOOD PRESSURE: 67 MMHG | OXYGEN SATURATION: 93 % | RESPIRATION RATE: 16 BRPM | HEART RATE: 60 BPM | BODY MASS INDEX: 23.04 KG/M2 | TEMPERATURE: 98.1 F | SYSTOLIC BLOOD PRESSURE: 116 MMHG

## 2019-12-16 DIAGNOSIS — M48.061 FORAMINAL STENOSIS OF LUMBAR REGION: ICD-10-CM

## 2019-12-16 DIAGNOSIS — I10 HTN, GOAL BELOW 130/80: Primary | ICD-10-CM

## 2019-12-16 DIAGNOSIS — R41.3 MEMORY LOSS OR IMPAIRMENT: ICD-10-CM

## 2019-12-16 RX ORDER — GABAPENTIN 100 MG/1
CAPSULE ORAL
Qty: 90 CAP | Refills: 5 | Status: SHIPPED | OUTPATIENT
Start: 2019-12-16 | End: 2020-06-17 | Stop reason: SDUPTHER

## 2019-12-16 RX ORDER — MEMANTINE HYDROCHLORIDE 21 MG/1
21 CAPSULE, EXTENDED RELEASE ORAL DAILY
Qty: 90 CAP | Refills: 2 | Status: SHIPPED | OUTPATIENT
Start: 2019-12-16 | End: 2020-10-01

## 2019-12-16 NOTE — PROGRESS NOTES
HISTORY OF PRESENT ILLNESS  Sabine Byers is a 80 y.o. female. HPI  Follow up, issues:  1. Chronic back pain with foraminal stenosis, slightly improved with Gabapentin 100 b.i.d. She has more trouble at night. We are going to bump to 100 in the morning, 200 at bedtime. No edema. 2. Memory loss, stable on Namenda 21. Would like a refill. 3. Hypertension, on Benicar. No cardiac symptoms, no chest pain or headache. BP is good. Review of Systems   Constitutional: Negative for chills, fever and weight loss. Respiratory: Negative for cough, shortness of breath and wheezing. Cardiovascular: Negative for chest pain, palpitations, orthopnea, leg swelling and PND. Gastrointestinal: Negative for abdominal pain, heartburn and nausea. Musculoskeletal: Positive for back pain. Negative for myalgias. Neurological: Negative for dizziness and headaches. Psychiatric/Behavioral: Positive for memory loss. Negative for depression. The patient does not have insomnia. Physical Exam  Vitals signs and nursing note reviewed. Constitutional:       Appearance: She is well-developed. Comments: In wheelchair fragile in nad   HENT:      Head: Normocephalic and atraumatic. Neck:      Musculoskeletal: Normal range of motion and neck supple. Thyroid: No thyromegaly. Vascular: No carotid bruit. Cardiovascular:      Rate and Rhythm: Normal rate and regular rhythm. Heart sounds: Normal heart sounds, S1 normal and S2 normal. No murmur. Pulmonary:      Effort: Pulmonary effort is normal. No respiratory distress. Breath sounds: Normal breath sounds. No wheezing or rales. Neurological:      Mental Status: She is alert and oriented to person, place, and time. Psychiatric:         Behavior: Behavior normal.         ASSESSMENT and PLAN  Diagnoses and all orders for this visit:    1. HTN, goal below 130/80-stable on med    2.  Foraminal stenosis of lumbar region  -     gabapentin (NEURONTIN) 100 mg capsule; 1 po qam and 2 qhs    3. Memory loss or impairment  -     memantine ER (NAMENDA XR) 21 mg capsule; Take 1 Cap by mouth daily.       the following changes in treatment are made: inc dose of gabapentin  Side effects possible reviewed in detail  A[p[tin 4mo

## 2020-01-29 DIAGNOSIS — M16.0 ARTHRITIS OF BOTH HIPS: ICD-10-CM

## 2020-01-29 RX ORDER — DICLOFENAC SODIUM 10 MG/G
4 GEL TOPICAL 4 TIMES DAILY
Qty: 800 G | Refills: 3 | Status: SHIPPED | OUTPATIENT
Start: 2020-01-29 | End: 2020-12-13

## 2020-01-29 RX ORDER — DICLOFENAC SODIUM 10 MG/G
GEL TOPICAL
Qty: 800 G | Refills: 3 | Status: SHIPPED | OUTPATIENT
Start: 2020-01-29 | End: 2020-01-29 | Stop reason: SDUPTHER

## 2020-02-07 ENCOUNTER — TELEPHONE (OUTPATIENT)
Dept: INTERNAL MEDICINE CLINIC | Age: 85
End: 2020-02-07

## 2020-02-07 NOTE — TELEPHONE ENCOUNTER
Patient's sister is requesting a script for meclizine to help her with her dizzy spells, states patient has been purchasing the medication OTC for $70 but with a script it will only cost $8. States patient has one pill left. Aware PCP is out of the office until Tuesday . Hoping another provider can review and approve.

## 2020-02-09 NOTE — TELEPHONE ENCOUNTER
Meclizine has risks in a 79 yo- I need to see her if she is really dizzy and can evaluate better tatatyana

## 2020-02-22 DIAGNOSIS — K21.9 GERD WITHOUT ESOPHAGITIS: ICD-10-CM

## 2020-02-22 RX ORDER — OMEPRAZOLE 20 MG/1
CAPSULE, DELAYED RELEASE ORAL
Qty: 30 CAP | Refills: 5 | Status: SHIPPED | OUTPATIENT
Start: 2020-02-22 | End: 2020-08-23

## 2020-02-25 DIAGNOSIS — M16.0 ARTHRITIS OF BOTH HIPS: ICD-10-CM

## 2020-02-26 RX ORDER — TRAMADOL HYDROCHLORIDE 50 MG/1
50 TABLET ORAL
Qty: 60 TAB | Refills: 0 | Status: SHIPPED | OUTPATIENT
Start: 2020-02-26 | End: 2020-03-23 | Stop reason: SDUPTHER

## 2020-02-28 ENCOUNTER — TELEPHONE (OUTPATIENT)
Dept: INTERNAL MEDICINE CLINIC | Age: 85
End: 2020-02-28

## 2020-02-28 NOTE — TELEPHONE ENCOUNTER
Per patient's daughter, patient's new script for Tramadol has not yet been received by patient's [facility] pharmacy , PSR confirmed a new script was sent in on 02/26 , requesting the script be sent in again .      [facility] would like a call back to confirm when it has been sent in at [phone]

## 2020-02-28 NOTE — TELEPHONE ENCOUNTER
Per pharmacy the Rx was received but because it was not picked up it was put back in stock. Notified the patient's daughter of this & to check with the pharmacy on the status. Cindy voiced understanding.

## 2020-03-23 DIAGNOSIS — M16.0 ARTHRITIS OF BOTH HIPS: ICD-10-CM

## 2020-03-23 RX ORDER — TRAMADOL HYDROCHLORIDE 50 MG/1
50 TABLET ORAL
Qty: 90 TAB | Refills: 1
Start: 2020-03-23 | End: 2021-01-01

## 2020-03-23 RX ORDER — TRAMADOL HYDROCHLORIDE 50 MG/1
50 TABLET ORAL
Qty: 90 TAB | Refills: 0 | Status: CANCELLED | OUTPATIENT
Start: 2020-03-23 | End: 2020-04-22

## 2020-03-23 NOTE — TELEPHONE ENCOUNTER
PCP was unable to send updated Rx electronically so Rx called into pharmacy on file left on v/m. Notified Erik Maker of this who voiced understanding. Also notified mom can skip her b12 injection this month.

## 2020-03-23 NOTE — TELEPHONE ENCOUNTER
Daughter, Sil Mancilla, is requesting a new script for Tramadol 50mg as the current directions are incorrect. It needs to read \"take 1 tablet every 8 hours\". Please send to [email or web address]). Also, pt wants to know if she should come in for her B12 shot or take an oral B12 until the coronavirus calms down. Please call daughter to confirm. Thanks.

## 2020-04-13 DIAGNOSIS — I10 HTN, GOAL BELOW 130/80: ICD-10-CM

## 2020-04-23 ENCOUNTER — TELEPHONE (OUTPATIENT)
Dept: INTERNAL MEDICINE CLINIC | Age: 85
End: 2020-04-23

## 2020-04-23 RX ORDER — OLMESARTAN MEDOXOMIL 40 MG/1
40 TABLET ORAL DAILY
Qty: 30 TAB | Refills: 1 | Status: SHIPPED | OUTPATIENT
Start: 2020-04-23 | End: 2020-06-24

## 2020-04-23 RX ORDER — AMLODIPINE BESYLATE 10 MG/1
10 TABLET ORAL DAILY
Qty: 30 TAB | Refills: 1 | Status: SHIPPED | OUTPATIENT
Start: 2020-04-23 | End: 2020-06-16

## 2020-04-23 NOTE — TELEPHONE ENCOUNTER
Patient was last seen in Dec 2019 & next scheduled office visit is 6/2020. Per PCP okay to refill meds to last until upcoming appt. Refill e-scribed to pharmacy on file.

## 2020-05-26 ENCOUNTER — VIRTUAL VISIT (OUTPATIENT)
Dept: INTERNAL MEDICINE CLINIC | Age: 85
End: 2020-05-26

## 2020-05-26 VITALS
SYSTOLIC BLOOD PRESSURE: 128 MMHG | DIASTOLIC BLOOD PRESSURE: 80 MMHG | BODY MASS INDEX: 23.03 KG/M2 | HEIGHT: 63 IN | HEART RATE: 68 BPM

## 2020-05-26 DIAGNOSIS — I10 HTN, GOAL BELOW 130/80: ICD-10-CM

## 2020-05-26 DIAGNOSIS — R51.9 LEFT FACIAL PAIN: Primary | ICD-10-CM

## 2020-05-26 RX ORDER — TRAMADOL HYDROCHLORIDE 50 MG/1
50 TABLET ORAL
COMMUNITY
End: 2020-06-24

## 2020-05-26 RX ORDER — METHYLPREDNISOLONE 4 MG/1
TABLET ORAL
Qty: 1 DOSE PACK | Refills: 0 | Status: SHIPPED | OUTPATIENT
Start: 2020-05-26 | End: 2020-06-11 | Stop reason: ALTCHOICE

## 2020-05-26 NOTE — PROGRESS NOTES
Telephone visit during covid crisis Dt is on phone as well Severe burning pain in left side of face -no rash  Noted no fevers and no injuries Notes steroids helped in past 
Currently on gabapentin bid and does not feel it helps her face pain Tried ultram for back no help Will use steroid dose pack-needs appt in 1 week if not improved Dt states no facial asymmetry and no trouble with visiion Time on phone 16 min

## 2020-06-11 ENCOUNTER — TELEPHONE (OUTPATIENT)
Dept: INTERNAL MEDICINE CLINIC | Age: 85
End: 2020-06-11

## 2020-06-11 RX ORDER — PREDNISONE 20 MG/1
20 TABLET ORAL
Qty: 6 TAB | Refills: 0 | Status: SHIPPED | OUTPATIENT
Start: 2020-06-11 | End: 2020-09-28

## 2020-06-11 NOTE — TELEPHONE ENCOUNTER
I sent  Prednisone every day for 6 days in place of themedrol if this does now work needs evaluation can be vv

## 2020-06-11 NOTE — TELEPHONE ENCOUNTER
Cindy expressed her mother is starting to have left-sided jaw & shoulder spasms again. Symptoms started about 4 days ago. They resolved during the steroid course but Wes Pyle feels the 5 day course was not long enough. Wes Pyle is unable to bring her mother in & is requesting a longer steroid course. Please advise.

## 2020-06-16 DIAGNOSIS — I10 HTN, GOAL BELOW 130/80: ICD-10-CM

## 2020-06-16 RX ORDER — AMLODIPINE BESYLATE 10 MG/1
TABLET ORAL
Qty: 30 TAB | Refills: 1 | Status: SHIPPED | OUTPATIENT
Start: 2020-06-16 | End: 2020-08-13

## 2020-06-17 ENCOUNTER — OFFICE VISIT (OUTPATIENT)
Dept: INTERNAL MEDICINE CLINIC | Age: 85
End: 2020-06-17

## 2020-06-17 ENCOUNTER — HOSPITAL ENCOUNTER (OUTPATIENT)
Dept: GENERAL RADIOLOGY | Age: 85
Discharge: HOME | End: 2020-06-17
Attending: INTERNAL MEDICINE
Payer: COMMERCIAL

## 2020-06-17 VITALS
HEART RATE: 72 BPM | OXYGEN SATURATION: 97 % | WEIGHT: 128 LBS | SYSTOLIC BLOOD PRESSURE: 109 MMHG | BODY MASS INDEX: 22.68 KG/M2 | RESPIRATION RATE: 18 BRPM | DIASTOLIC BLOOD PRESSURE: 68 MMHG | HEIGHT: 63 IN

## 2020-06-17 DIAGNOSIS — M54.6 THORACIC SPINE PAIN: Primary | ICD-10-CM

## 2020-06-17 DIAGNOSIS — M54.6 THORACIC SPINE PAIN: ICD-10-CM

## 2020-06-17 DIAGNOSIS — R41.3 MEMORY LOSS OR IMPAIRMENT: ICD-10-CM

## 2020-06-17 DIAGNOSIS — I10 HTN, GOAL BELOW 130/80: ICD-10-CM

## 2020-06-17 DIAGNOSIS — R42 VERTIGO: ICD-10-CM

## 2020-06-17 DIAGNOSIS — M48.061 FORAMINAL STENOSIS OF LUMBAR REGION: ICD-10-CM

## 2020-06-17 PROCEDURE — 72072 X-RAY EXAM THORAC SPINE 3VWS: CPT

## 2020-06-17 RX ORDER — MECLIZINE HCL 12.5 MG 12.5 MG/1
12.5 TABLET ORAL
Qty: 25 TAB | Refills: 0 | Status: SHIPPED | OUTPATIENT
Start: 2020-06-17 | End: 2020-09-07

## 2020-06-17 RX ORDER — GABAPENTIN 100 MG/1
CAPSULE ORAL
Qty: 120 CAP | Refills: 3 | Status: SHIPPED | OUTPATIENT
Start: 2020-06-17 | End: 2020-10-19

## 2020-06-17 NOTE — PROGRESS NOTES
Room:  
 
Identified pt with two pt identifiers(name and ). Reviewed record in preparation for visit and have obtained necessary documentation. All patient medications has been reviewed. Chief Complaint Patient presents with  Hypertension 4 mo f/u Health Maintenance Due Topic  DTaP/Tdap/Td series (1 - Tdap)  Shingrix Vaccine Age 50> (1 of 2)  GLAUCOMA SCREENING Q2Y  Bone Densitometry (Dexa) Screening  Medicare Yearly Exam   
 
 
There were no vitals filed for this visit. 1.Have you traveled outside of the [address] [place] [address] [address] in the last month No 
 
2. Have you been in close contact with someone who is suspected to have COVID-19 or has tested positive. No 
 
3. Do you have any signs or symptoms. ? 4. Have you been to the ER, urgent care clinic since your last visit? Hospitalized since your last visit? No 
 
5. Have you seen or consulted any other health care providers outside of the [address] [place] [address] since your last visit? Include any pap smears or colon screening. No 
 
6. Would you like to receive your flu shot today? NO 
 
7. Are you fasting for blood work today? NO 
 
8. Do you have an Advanced Directive/ Living Will in place? YES If yes, do we have a copy on file YES If no, would you like information NO Patient is accompanied by self I have received verbal consent from Fayette Kanner to discuss any/all medical information while they are present in the room.

## 2020-06-17 NOTE — PROGRESS NOTES
Notify her dt verna that no fracture seen does have djd chronic changes cause chroinc pain and spasm in back  Lets try to get tejeda pt to do home pt services if this is available now ?

## 2020-06-18 DIAGNOSIS — M54.6 THORACIC SPINE PAIN: Primary | ICD-10-CM

## 2020-06-18 NOTE — PROGRESS NOTES
Patient notified of x-ray results.  Will fax order to [facility] to see if they can assist with some in-home PT.

## 2020-06-24 DIAGNOSIS — I10 HTN, GOAL BELOW 130/80: ICD-10-CM

## 2020-06-24 DIAGNOSIS — G89.4 CHRONIC PAIN DISORDER: Primary | ICD-10-CM

## 2020-06-24 RX ORDER — TRAMADOL HYDROCHLORIDE 50 MG/1
TABLET ORAL
Qty: 90 TAB | Refills: 1 | Status: SHIPPED | OUTPATIENT
Start: 2020-06-24 | End: 2020-09-08

## 2020-06-24 RX ORDER — OLMESARTAN MEDOXOMIL 40 MG/1
TABLET ORAL
Qty: 30 TAB | Refills: 1 | Status: SHIPPED | OUTPATIENT
Start: 2020-06-24 | End: 2020-08-23

## 2020-08-13 DIAGNOSIS — I10 HTN, GOAL BELOW 130/80: ICD-10-CM

## 2020-08-13 RX ORDER — AMLODIPINE BESYLATE 10 MG/1
TABLET ORAL
Qty: 30 TAB | Refills: 1 | Status: SHIPPED | OUTPATIENT
Start: 2020-08-13 | End: 2020-10-11

## 2020-08-23 DIAGNOSIS — I10 HTN, GOAL BELOW 130/80: ICD-10-CM

## 2020-08-23 DIAGNOSIS — K21.9 GERD WITHOUT ESOPHAGITIS: ICD-10-CM

## 2020-08-23 RX ORDER — OLMESARTAN MEDOXOMIL 40 MG/1
TABLET ORAL
Qty: 30 TAB | Refills: 1 | Status: SHIPPED | OUTPATIENT
Start: 2020-08-23 | End: 2020-10-16

## 2020-08-23 RX ORDER — OMEPRAZOLE 20 MG/1
CAPSULE, DELAYED RELEASE ORAL
Qty: 30 CAP | Refills: 5 | Status: SHIPPED | OUTPATIENT
Start: 2020-08-23 | End: 2021-01-20

## 2020-09-05 DIAGNOSIS — R42 VERTIGO: ICD-10-CM

## 2020-09-07 RX ORDER — MECLIZINE HCL 12.5 MG 12.5 MG/1
12.5 TABLET ORAL
Qty: 25 TAB | Refills: 0 | Status: SHIPPED | OUTPATIENT
Start: 2020-09-07 | End: 2020-09-17

## 2020-09-08 DIAGNOSIS — G89.4 CHRONIC PAIN DISORDER: ICD-10-CM

## 2020-09-08 RX ORDER — TRAMADOL HYDROCHLORIDE 50 MG/1
TABLET ORAL
Qty: 90 TAB | Refills: 1 | Status: SHIPPED | OUTPATIENT
Start: 2020-09-08 | End: 2020-11-13

## 2020-09-28 ENCOUNTER — OFFICE VISIT (OUTPATIENT)
Dept: INTERNAL MEDICINE CLINIC | Age: 85
End: 2020-09-28
Payer: MEDICARE

## 2020-09-28 VITALS
BODY MASS INDEX: 23.04 KG/M2 | OXYGEN SATURATION: 96 % | HEART RATE: 64 BPM | SYSTOLIC BLOOD PRESSURE: 113 MMHG | WEIGHT: 130 LBS | RESPIRATION RATE: 16 BRPM | HEIGHT: 63 IN | DIASTOLIC BLOOD PRESSURE: 68 MMHG

## 2020-09-28 DIAGNOSIS — G89.29 OTHER CHRONIC PAIN: ICD-10-CM

## 2020-09-28 DIAGNOSIS — E23.6 PITUITARY CYST (HCC): ICD-10-CM

## 2020-09-28 DIAGNOSIS — M48.061 FORAMINAL STENOSIS OF LUMBAR REGION: Primary | ICD-10-CM

## 2020-09-28 DIAGNOSIS — R60.0 LOCALIZED EDEMA: ICD-10-CM

## 2020-09-28 DIAGNOSIS — Z23 ENCOUNTER FOR IMMUNIZATION: ICD-10-CM

## 2020-09-28 DIAGNOSIS — I10 HTN, GOAL BELOW 130/80: ICD-10-CM

## 2020-09-28 PROBLEM — S82.64XA CLOSED NONDISPLACED FRACTURE OF LATERAL MALLEOLUS OF RIGHT FIBULA: Status: ACTIVE | Noted: 2018-04-30

## 2020-09-28 PROBLEM — M20.5X2 CROSSOVER TOE DEFORMITY OF LEFT FOOT: Status: ACTIVE | Noted: 2018-05-29

## 2020-09-28 PROCEDURE — 99214 OFFICE O/P EST MOD 30 MIN: CPT | Performed by: INTERNAL MEDICINE

## 2020-09-28 PROCEDURE — 90694 VACC AIIV4 NO PRSRV 0.5ML IM: CPT

## 2020-09-28 PROCEDURE — G0008 ADMIN INFLUENZA VIRUS VAC: HCPCS | Performed by: INTERNAL MEDICINE

## 2020-09-28 NOTE — PROGRESS NOTES
HISTORY OF PRESENT ILLNESS Subha Platt is a 80 y.o. female. HPI Seen for med check, accompanied by daughter, Nicole Brandt. She continues to have pain in her back that is between 6-10 level. She started taking Tramadol at 6 AM, and it does help, but she complains it bothers her at night as well. Currently on Tramadol 50 t.i.d. and Gabapentin 200 in the morning, 200 at night. Has some edema, which has been intermittent. No falls. Has known arthritis and stenosis of back. Hypertension, on Olmesartan and Amlodipine. No constipation. Blood pressure is good. No chest pain. Memory loss. Remains on Namenda. This has not been progressive. Review of Systems Constitutional: Positive for malaise/fatigue. Negative for chills, fever and weight loss. Respiratory: Negative for cough, shortness of breath and wheezing. Cardiovascular: Positive for leg swelling. Negative for chest pain, palpitations, orthopnea and PND. Gastrointestinal: Negative for constipation, heartburn and nausea. Musculoskeletal: Positive for back pain and joint pain. Negative for falls and myalgias. Skin: Negative for rash. Neurological: Negative for dizziness, focal weakness and headaches. Psychiatric/Behavioral: Positive for memory loss. Physical Exam 
Vitals signs and nursing note reviewed. Constitutional:   
   Appearance: She is well-developed. Comments: Fragile in wheelchair wearing  Hearing aides HENT:  
   Head: Normocephalic and atraumatic. Neck: Musculoskeletal: Normal range of motion and neck supple. Thyroid: No thyromegaly. Vascular: No carotid bruit. Cardiovascular:  
   Rate and Rhythm: Normal rate and regular rhythm. Heart sounds: Normal heart sounds, S1 normal and S2 normal. No murmur. Pulmonary:  
   Effort: Pulmonary effort is normal. No respiratory distress. Breath sounds: Normal breath sounds. No wheezing or rales. Musculoskeletal: Right lower leg: Edema present. Left lower leg: Edema (trace bilat) present. Comments: oa changes of hands and ankles Neurological:  
   General: No focal deficit present. Mental Status: She is alert. Psychiatric:     
   Behavior: Behavior normal.  
 
 
 
ASSESSMENT and PLAN Diagnoses and all orders for this visit: 1. Foraminal stenosis of lumbar region-with chronic pain add tylenol tid to ultram tid and see ortho 
-     REFERRAL TO ORTHOPEDICS 2. Pituitary cyst (HCC) 
-     CBC WITH AUTOMATED DIFF; Future 3. HTN, goal below 130/80-stable on meds -     METABOLIC PANEL, COMPREHENSIVE; Future 4. Localized edema 
-     TSH 3RD GENERATION; Future 5. Other chronic pain 
 
appt in jan

## 2020-09-29 LAB
ALBUMIN SERPL-MCNC: 3.5 G/DL (ref 3.5–5)
ALBUMIN/GLOB SERPL: 1.1 {RATIO} (ref 1.1–2.2)
ALP SERPL-CCNC: 112 U/L (ref 45–117)
ALT SERPL-CCNC: 15 U/L (ref 12–78)
ANION GAP SERPL CALC-SCNC: 7 MMOL/L (ref 5–15)
AST SERPL-CCNC: 25 U/L (ref 15–37)
BASOPHILS # BLD: 0.1 K/UL (ref 0–0.1)
BASOPHILS NFR BLD: 1 % (ref 0–1)
BILIRUB SERPL-MCNC: 0.2 MG/DL (ref 0.2–1)
BUN SERPL-MCNC: 21 MG/DL (ref 6–20)
BUN/CREAT SERPL: 24 (ref 12–20)
CALCIUM SERPL-MCNC: 8.4 MG/DL (ref 8.5–10.1)
CHLORIDE SERPL-SCNC: 109 MMOL/L (ref 97–108)
CO2 SERPL-SCNC: 26 MMOL/L (ref 21–32)
CREAT SERPL-MCNC: 0.86 MG/DL (ref 0.55–1.02)
DIFFERENTIAL METHOD BLD: ABNORMAL
EOSINOPHIL # BLD: 0.1 K/UL (ref 0–0.4)
EOSINOPHIL NFR BLD: 3 % (ref 0–7)
ERYTHROCYTE [DISTWIDTH] IN BLOOD BY AUTOMATED COUNT: 14.7 % (ref 11.5–14.5)
GLOBULIN SER CALC-MCNC: 3.2 G/DL (ref 2–4)
GLUCOSE SERPL-MCNC: 90 MG/DL (ref 65–100)
HCT VFR BLD AUTO: 30.8 % (ref 35–47)
HGB BLD-MCNC: 9 G/DL (ref 11.5–16)
IMM GRANULOCYTES # BLD AUTO: 0 K/UL (ref 0–0.04)
IMM GRANULOCYTES NFR BLD AUTO: 0 % (ref 0–0.5)
LYMPHOCYTES # BLD: 0.8 K/UL (ref 0.8–3.5)
LYMPHOCYTES NFR BLD: 17 % (ref 12–49)
MCH RBC QN AUTO: 28 PG (ref 26–34)
MCHC RBC AUTO-ENTMCNC: 29.2 G/DL (ref 30–36.5)
MCV RBC AUTO: 95.7 FL (ref 80–99)
MONOCYTES # BLD: 0.4 K/UL (ref 0–1)
MONOCYTES NFR BLD: 8 % (ref 5–13)
NEUTS SEG # BLD: 3.6 K/UL (ref 1.8–8)
NEUTS SEG NFR BLD: 71 % (ref 32–75)
NRBC # BLD: 0 K/UL (ref 0–0.01)
NRBC BLD-RTO: 0 PER 100 WBC
PLATELET # BLD AUTO: 292 K/UL (ref 150–400)
PMV BLD AUTO: 11.8 FL (ref 8.9–12.9)
POTASSIUM SERPL-SCNC: 4.7 MMOL/L (ref 3.5–5.1)
PROT SERPL-MCNC: 6.7 G/DL (ref 6.4–8.2)
RBC # BLD AUTO: 3.22 M/UL (ref 3.8–5.2)
SODIUM SERPL-SCNC: 142 MMOL/L (ref 136–145)
TSH SERPL DL<=0.05 MIU/L-ACNC: 1.59 UIU/ML (ref 0.36–3.74)
WBC # BLD AUTO: 5 K/UL (ref 3.6–11)

## 2020-09-30 ENCOUNTER — TELEPHONE (OUTPATIENT)
Dept: INTERNAL MEDICINE CLINIC | Age: 85
End: 2020-09-30

## 2020-09-30 DIAGNOSIS — D51.0 PERNICIOUS ANEMIA: ICD-10-CM

## 2020-09-30 NOTE — TELEPHONE ENCOUNTER
Discussed with duane samuels about increased anemia-she notes no b12 shots for 6 months due to covid  Will resume b12 shots and po iron daily  appt here in 3mo  Discussed could do egd if any gross bleeding seen-dt will watch

## 2020-10-01 DIAGNOSIS — R41.3 MEMORY LOSS OR IMPAIRMENT: ICD-10-CM

## 2020-10-01 RX ORDER — MEMANTINE HYDROCHLORIDE 21 MG/1
CAPSULE, EXTENDED RELEASE ORAL
Qty: 90 CAP | Refills: 3 | Status: SHIPPED | OUTPATIENT
Start: 2020-10-01 | End: 2021-01-01

## 2020-10-11 DIAGNOSIS — I10 HTN, GOAL BELOW 130/80: ICD-10-CM

## 2020-10-11 RX ORDER — AMLODIPINE BESYLATE 10 MG/1
TABLET ORAL
Qty: 30 TAB | Refills: 1 | Status: SHIPPED | OUTPATIENT
Start: 2020-10-11 | End: 2020-11-04

## 2020-10-16 DIAGNOSIS — I10 HTN, GOAL BELOW 130/80: ICD-10-CM

## 2020-10-16 RX ORDER — OLMESARTAN MEDOXOMIL 40 MG/1
TABLET ORAL
Qty: 30 TAB | Refills: 1 | Status: SHIPPED | OUTPATIENT
Start: 2020-10-16 | End: 2020-11-11

## 2020-10-19 DIAGNOSIS — M54.6 THORACIC SPINE PAIN: ICD-10-CM

## 2020-10-19 DIAGNOSIS — M48.061 FORAMINAL STENOSIS OF LUMBAR REGION: ICD-10-CM

## 2020-10-19 RX ORDER — GABAPENTIN 100 MG/1
CAPSULE ORAL
Qty: 120 CAP | Refills: 3 | Status: SHIPPED | OUTPATIENT
Start: 2020-10-19 | End: 2021-01-01

## 2020-10-29 ENCOUNTER — TRANSCRIBE ORDER (OUTPATIENT)
Dept: SCHEDULING | Age: 85
End: 2020-10-29

## 2020-10-29 DIAGNOSIS — M41.50 DEGENERATIVE SCOLIOSIS IN ADULT PATIENT: ICD-10-CM

## 2020-10-29 DIAGNOSIS — M54.50 LUMBAR SPINE PAIN: Primary | ICD-10-CM

## 2020-11-04 DIAGNOSIS — I10 HTN, GOAL BELOW 130/80: ICD-10-CM

## 2020-11-04 RX ORDER — AMLODIPINE BESYLATE 10 MG/1
TABLET ORAL
Qty: 30 TAB | Refills: 1 | Status: SHIPPED | OUTPATIENT
Start: 2020-11-04 | End: 2020-12-01

## 2020-11-05 ENCOUNTER — HOSPITAL ENCOUNTER (OUTPATIENT)
Dept: MRI IMAGING | Age: 85
Discharge: HOME | End: 2020-11-05
Attending: ORTHOPAEDIC SURGERY
Payer: MEDICARE

## 2020-11-05 DIAGNOSIS — M41.50 DEGENERATIVE SCOLIOSIS IN ADULT PATIENT: ICD-10-CM

## 2020-11-05 DIAGNOSIS — M54.50 LUMBAR SPINE PAIN: ICD-10-CM

## 2020-11-05 PROCEDURE — 72148 MRI LUMBAR SPINE W/O DYE: CPT

## 2020-11-11 DIAGNOSIS — I10 HTN, GOAL BELOW 130/80: ICD-10-CM

## 2020-11-11 RX ORDER — OLMESARTAN MEDOXOMIL 40 MG/1
TABLET ORAL
Qty: 30 TAB | Refills: 1 | Status: SHIPPED | OUTPATIENT
Start: 2020-11-11 | End: 2020-12-11

## 2020-11-12 DIAGNOSIS — G89.4 CHRONIC PAIN DISORDER: ICD-10-CM

## 2020-11-13 RX ORDER — TRAMADOL HYDROCHLORIDE 50 MG/1
TABLET ORAL
Qty: 90 TAB | Refills: 1 | Status: SHIPPED | OUTPATIENT
Start: 2020-11-13 | End: 2021-02-07

## 2020-11-20 ENCOUNTER — HOSPITAL ENCOUNTER (EMERGENCY)
Age: 85
Discharge: HOME | End: 2020-11-20
Attending: EMERGENCY MEDICINE
Payer: MEDICARE

## 2020-11-20 ENCOUNTER — APPOINTMENT (OUTPATIENT)
Dept: CT IMAGING | Age: 85
End: 2020-11-20
Attending: EMERGENCY MEDICINE
Payer: MEDICARE

## 2020-11-20 VITALS
DIASTOLIC BLOOD PRESSURE: 60 MMHG | HEART RATE: 62 BPM | WEIGHT: 132.72 LBS | SYSTOLIC BLOOD PRESSURE: 126 MMHG | BODY MASS INDEX: 23.51 KG/M2 | TEMPERATURE: 98 F | RESPIRATION RATE: 16 BRPM | OXYGEN SATURATION: 92 %
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
## 2020-11-20 DIAGNOSIS — M54.50 ACUTE MIDLINE LOW BACK PAIN WITHOUT SCIATICA: ICD-10-CM

## 2020-11-20 LAB
ALBUMIN SERPL-MCNC: 3.2 G/DL (ref 3.5–5)
ALBUMIN/GLOB SERPL: 0.9 {RATIO} (ref 1.1–2.2)
ALP SERPL-CCNC: 100 U/L (ref 45–117)
ALT SERPL-CCNC: 15 U/L (ref 12–78)
ANION GAP SERPL CALC-SCNC: 10 MMOL/L (ref 5–15)
AST SERPL-CCNC: 20 U/L (ref 15–37)
ATRIAL RATE: 64 BPM
BASOPHILS # BLD: 0 K/UL (ref 0–0.1)
BASOPHILS NFR BLD: 1 % (ref 0–1)
BILIRUB SERPL-MCNC: 0.2 MG/DL (ref 0.2–1)
BUN SERPL-MCNC: 17 MG/DL (ref 6–20)
BUN/CREAT SERPL: 21 (ref 12–20)
CALCIUM SERPL-MCNC: 8.3 MG/DL (ref 8.5–10.1)
CALCULATED P AXIS, ECG09: 12 DEGREES
CALCULATED R AXIS, ECG10: -58 DEGREES
CALCULATED T AXIS, ECG11: 20 DEGREES
CHLORIDE SERPL-SCNC: 104 MMOL/L (ref 97–108)
CO2 SERPL-SCNC: 26 MMOL/L (ref 21–32)
CREAT SERPL-MCNC: 0.81 MG/DL (ref 0.55–1.02)
DIAGNOSIS, 93000: NORMAL
DIFFERENTIAL METHOD BLD: ABNORMAL
EOSINOPHIL # BLD: 0.1 K/UL (ref 0–0.4)
EOSINOPHIL NFR BLD: 1 % (ref 0–7)
ERYTHROCYTE [DISTWIDTH] IN BLOOD BY AUTOMATED COUNT: 17.8 % (ref 11.5–14.5)
GLOBULIN SER CALC-MCNC: 3.5 G/DL (ref 2–4)
GLUCOSE SERPL-MCNC: 107 MG/DL (ref 65–100)
HCT VFR BLD AUTO: 35.2 % (ref 35–47)
HGB BLD-MCNC: 10.8 G/DL (ref 11.5–16)
IMM GRANULOCYTES # BLD AUTO: 0 K/UL (ref 0–0.04)
IMM GRANULOCYTES NFR BLD AUTO: 1 % (ref 0–0.5)
LYMPHOCYTES # BLD: 0.9 K/UL (ref 0.8–3.5)
LYMPHOCYTES NFR BLD: 17 % (ref 12–49)
MCH RBC QN AUTO: 28.9 PG (ref 26–34)
MCHC RBC AUTO-ENTMCNC: 30.7 G/DL (ref 30–36.5)
MCV RBC AUTO: 94.1 FL (ref 80–99)
MONOCYTES # BLD: 0.5 K/UL (ref 0–1)
MONOCYTES NFR BLD: 9 % (ref 5–13)
NEUTS SEG # BLD: 3.8 K/UL (ref 1.8–8)
NEUTS SEG NFR BLD: 71 % (ref 32–75)
NRBC # BLD: 0 K/UL (ref 0–0.01)
NRBC BLD-RTO: 0 PER 100 WBC
P-R INTERVAL, ECG05: 154 MS
PLATELET # BLD AUTO: 220 K/UL (ref 150–400)
PMV BLD AUTO: 9.5 FL (ref 8.9–12.9)
POTASSIUM SERPL-SCNC: 4.3 MMOL/L (ref 3.5–5.1)
PROT SERPL-MCNC: 6.7 G/DL (ref 6.4–8.2)
Q-T INTERVAL, ECG07: 424 MS
QRS DURATION, ECG06: 108 MS
QTC CALCULATION (BEZET), ECG08: 437 MS
RBC # BLD AUTO: 3.74 M/UL (ref 3.8–5.2)
SODIUM SERPL-SCNC: 140 MMOL/L (ref 136–145)
TROPONIN I SERPL-MCNC: <0.05 NG/ML
VENTRICULAR RATE, ECG03: 64 BPM
WBC # BLD AUTO: 5.3 K/UL (ref 3.6–11)

## 2020-11-20 PROCEDURE — 85025 COMPLETE CBC W/AUTO DIFF WBC: CPT

## 2020-11-20 PROCEDURE — 80053 COMPREHEN METABOLIC PANEL: CPT

## 2020-11-20 PROCEDURE — 84484 ASSAY OF TROPONIN QUANT: CPT

## 2020-11-20 PROCEDURE — 72131 CT LUMBAR SPINE W/O DYE: CPT

## 2020-11-20 PROCEDURE — 93005 ELECTROCARDIOGRAM TRACING: CPT

## 2020-11-20 PROCEDURE — 99283 EMERGENCY DEPT VISIT LOW MDM: CPT

## 2020-11-20 PROCEDURE — 36415 COLL VENOUS BLD VENIPUNCTURE: CPT

## 2020-11-20 PROCEDURE — 70450 CT HEAD/BRAIN W/O DYE: CPT

## 2020-11-20 RX ORDER — DIAZEPAM 2 MG/1
1 TABLET ORAL
Qty: 4 TAB | Refills: 0 | Status: SHIPPED | OUTPATIENT
Start: 2020-11-20 | End: 2020-11-23

## 2020-11-20 RX ORDER — ACETAMINOPHEN 325 MG/1
325 TABLET ORAL
Status: ON HOLD | COMMUNITY
End: 2022-01-01

## 2020-11-20 RX ORDER — LIDOCAINE 50 MG/G
PATCH TOPICAL
Qty: 1 EACH | Refills: 0 | Status: ON HOLD | OUTPATIENT
Start: 2020-11-20 | End: 2022-01-01

## 2020-11-20 NOTE — ED PROVIDER NOTES
49-year-old female presents with lower back pain after ground-level fall 4 days ago. She slipped while in the kitchen. She was bent over in the refrigerator and stood up too fast and got dizzy. She states she hit her head but does not think that she lost consciousness. She complains of lower back pain. She has chronic back pain and is scheduled to see a pain management doctor who is going to give her injections on Wednesday. She denies any numbness or tingling. She denies any problems with her bowel or bladder. There are no other medical concerns at this time. Fall Back Pain Past Medical History:  
Diagnosis Date  Anemia  Back pain  Bilateral hearing loss 1/29/2019  H/O: hysterectomy 1/29/2019  
 Hair loss  History of double vision  Hx of diarrhea  Hx of hemorrhoids  Hypertension  Incontinence of urine  Joint pain  Joint swelling  Memory loss or impairment 1/29/2019  Muscle ache  Muscle weakness  Pernicious anemia 1/28/2019  Pituitary cyst ([facility] [address] 1/29/2019 Resection [id number] and again in 2013 benign  PUD (peptic ulcer disease) 1/29/2019 Surgery for duodenum ? Ulcer in 2009  Sleep difficulties  Stiffness in joint  Vision blurring History reviewed. No pertinent surgical history. Family History:  
Problem Relation Age of Onset [address] [facility] Other Mother  Cancer Father  Heart Disease Father Social History Socioeconomic History  Marital status:  Spouse name: Not on file  Number of children: Not on file  Years of education: Not on file  Highest education level: Not on file Occupational History  Not on file Social Needs  Financial resource strain: Not on file  Food insecurity Worry: Not on file Inability: Not on file  Transportation needs Medical: Not on file Non-medical: Not on file Tobacco Use  Smoking status: Never Smoker  Smokeless tobacco: Never Used Substance and Sexual Activity  Alcohol use: Yes Comment: Rare  Drug use: No  
 Sexual activity: Never Lifestyle  Physical activity Days per week: Not on file Minutes per session: Not on file  Stress: Not on file Relationships  Social connections Talks on phone: Not on file Gets together: Not on file Attends Bahai service: Not on file Active member of club or organization: Not on file Attends meetings of clubs or organizations: Not on file Relationship status: Not on file  Intimate partner violence Fear of current or ex partner: Not on file Emotionally abused: Not on file Physically abused: Not on file Forced sexual activity: Not on file Other Topics Concern  Not on file Social History Narrative  Not on file ALLERGIES: Penicillins; Percocet [oxycodone-acetaminophen]; and Sulfa (sulfonamide antibiotics) Review of Systems Musculoskeletal: Positive for back pain. All other systems reviewed and are negative. Vitals:  
 11/20/20 1440 11/20/20 1443 11/20/20 1451 11/20/20 1500 BP: (!) 153/73  (!) 153/73 (!) 155/82 Pulse:   62 Resp:   16 Temp:   98 °F (36.7 °C) SpO2:  96%  96% Weight:   60.2 kg (132 lb 11.5 oz) Physical Exam 
Constitutional:   
   Appearance: She is well-developed. Comments: Elderly female in no acute distress HENT:  
   Head: Normocephalic and atraumatic. Eyes:  
   General: No scleral icterus. Neck: Musculoskeletal: No neck rigidity. Trachea: No tracheal deviation. Cardiovascular:  
   Rate and Rhythm: Normal rate. Pulses: Normal pulses. Pulmonary:  
   Effort: Pulmonary effort is normal. No respiratory distress. Abdominal:  
   General: There is no distension. Genitourinary: 
   Comments: deferred Musculoskeletal:     
   General: No deformity. Comments: Tenderness of the lumbar spine at approximately L4-L5. Skin: 
   General: Skin is dry. Neurological:  
   General: No focal deficit present. Mental Status: She is alert. Sensory: No sensory deficit. Motor: No weakness. Psychiatric:     
   Mood and Affect: Mood normal.  
   Comments: Pleasant mood and affect MDM Procedures 4:19 PM 
Patient re-evaluated. All questions answered. Patient appropriate for discharge. Given return precautions and follow up instructions. LABORATORY TESTS: 
Labs Reviewed CBC WITH AUTOMATED DIFF - Abnormal; Notable for the following components:  
    Result Value RBC 3.74 (*) HGB 10.8 (*)   
 RDW 17.8 (*) IMMATURE GRANULOCYTES 1 (*) All other components within normal limits METABOLIC PANEL, COMPREHENSIVE - Abnormal; Notable for the following components:  
 Glucose 107 (*)   
 BUN/Creatinine ratio 21 (*) Calcium 8.3 (*) Albumin 3.2 (*) A-G Ratio 0.9 (*) All other components within normal limits TROPONIN I IMAGING RESULTS: 
CT HEAD WO CONT Final Result IMPRESSION:   
1. No evidence of acute intracranial abnormality by this modality. 2. Periventricular white matter hypoattenuation which is a nonspecific finding  
that is often associated with chronic microvascular ischemic change. CT SPINE LUMB WO CONT Final Result IMPRESSION:  
No visible fracture. MEDICATIONS GIVEN: 
Medications - No data to display IMPRESSION: 
1. Fall, initial encounter 2. Acute midline low back pain without sciatica PLAN: 
1. Current Discharge Medication List  
  
START taking these medications Details  
diazePAM (VALIUM) 2 mg tablet Take 0.5 Tabs by mouth every eight (8) hours as needed for Anxiety for up to 3 days. Max Daily Amount: 3 mg. Qty: 4 Tab, Refills: 0 Associated Diagnoses: Fall, initial encounter lidocaine (Lidoderm) 5 % Apply patch to the affected area for 12 hours a day and remove for 12 hours a day. Qty: 1 Each, Refills: 0  
  
  
 
2. Follow-up Information Follow up With Specialties Details Why Contact Info Ashley Quiroga MD Internal Medicine Schedule an appointment as soon as possible for a visit   Rashaun Aranda [facility] [address] [facility] 
[phone] [facility] EMERGENCY DEPT Emergency Medicine  If symptoms worsen or new concerns [facility] [address] 
[phone] Your pain management doctor   As scheduled 3. Return to ED for new or worsening symptoms Janak Cardenas.  Rod De Leon MD

## 2020-11-20 NOTE — ED TRIAGE NOTES
Pt presents to ED with c/o mid to low back pain after GLF on 11/16/2020. Pt slipped walking in the kitchen. Pt states she hit her head but had no LOC.

## 2020-11-20 NOTE — ED NOTES
Patient discharged home in stable condition by Dr Alee Lainez. Discharge instructions given to patient and daughter.

## 2020-11-25 ENCOUNTER — TELEPHONE (OUTPATIENT)
Dept: INTERNAL MEDICINE CLINIC | Age: 85
End: 2020-11-25

## 2020-11-25 NOTE — TELEPHONE ENCOUNTER
Spoke with patient's daughter and advised her that per Dr. Jami Estes does not need to see her unless they feel she needs to be seen. Pt's daughter understood and was thankful for the call.

## 2020-11-25 NOTE — TELEPHONE ENCOUNTER
----- Message from [place] sent at 11/25/2020 11:25 AM EST -----  Regarding: Dr. Cali Guillen Message/Vendor Calls    Caller's first and last name: Adama Parker      Reason for call: Pt was seen in the [facility] ER on 11/20/20 for a fall. Pt had CT scans which were normal.  Pt is doing well and is seeing a pain management specialist.  Pt is having an spinal injection today. Pt is scheduled to see Dr. Alyce Barbosa on 1/25/20 for routine care. Requesting a call back to see If ER follow up appt is necessary.        Callback required yes/no and why: yes      Best contact number(s):[phone]      Details to clarify the request: [facility]

## 2020-12-01 DIAGNOSIS — I10 HTN, GOAL BELOW 130/80: ICD-10-CM

## 2020-12-01 RX ORDER — AMLODIPINE BESYLATE 10 MG/1
TABLET ORAL
Qty: 30 TAB | Refills: 1 | Status: SHIPPED | OUTPATIENT
Start: 2020-12-01 | End: 2020-12-30

## 2020-12-09 DIAGNOSIS — I10 HTN, GOAL BELOW 130/80: ICD-10-CM

## 2020-12-11 RX ORDER — OLMESARTAN MEDOXOMIL 40 MG/1
TABLET ORAL
Qty: 30 TAB | Refills: 1 | Status: SHIPPED | OUTPATIENT
Start: 2020-12-11 | End: 2021-01-07

## 2020-12-12 DIAGNOSIS — M16.0 ARTHRITIS OF BOTH HIPS: ICD-10-CM

## 2020-12-13 RX ORDER — DICLOFENAC SODIUM 10 MG/G
GEL TOPICAL
Qty: 800 G | Refills: 6 | Status: SHIPPED | OUTPATIENT
Start: 2020-12-13 | End: 2021-01-01 | Stop reason: ALTCHOICE

## 2020-12-30 DIAGNOSIS — I10 HTN, GOAL BELOW 130/80: ICD-10-CM

## 2020-12-30 RX ORDER — AMLODIPINE BESYLATE 10 MG/1
TABLET ORAL
Qty: 30 TAB | Refills: 1 | Status: SHIPPED | OUTPATIENT
Start: 2020-12-30 | End: 2021-01-26

## 2021-01-01 ENCOUNTER — HOME CARE VISIT (OUTPATIENT)
Dept: SCHEDULING | Facility: HOME HEALTH | Age: 86
End: 2021-01-01
Payer: MEDICARE

## 2021-01-01 ENCOUNTER — TELEPHONE (OUTPATIENT)
Dept: INTERNAL MEDICINE CLINIC | Age: 86
End: 2021-01-01

## 2021-01-01 ENCOUNTER — OFFICE VISIT (OUTPATIENT)
Dept: INTERNAL MEDICINE CLINIC | Age: 86
End: 2021-01-01
Payer: MEDICARE

## 2021-01-01 ENCOUNTER — APPOINTMENT (OUTPATIENT)
Dept: CT IMAGING | Age: 86
End: 2021-01-01
Attending: EMERGENCY MEDICINE
Payer: MEDICARE

## 2021-01-01 ENCOUNTER — IMMUNIZATION (OUTPATIENT)
Dept: INTERNAL MEDICINE CLINIC | Age: 86
End: 2021-01-01
Payer: MEDICARE

## 2021-01-01 ENCOUNTER — PATIENT MESSAGE (OUTPATIENT)
Dept: INTERNAL MEDICINE CLINIC | Age: 86
End: 2021-01-01

## 2021-01-01 ENCOUNTER — VIRTUAL VISIT (OUTPATIENT)
Dept: INTERNAL MEDICINE CLINIC | Age: 86
End: 2021-01-01
Payer: MEDICARE

## 2021-01-01 ENCOUNTER — HOSPITAL ENCOUNTER (EMERGENCY)
Age: 86
Discharge: HOME | End: 2021-07-27
Attending: EMERGENCY MEDICINE
Payer: MEDICARE

## 2021-01-01 ENCOUNTER — HOME HEALTH ADMISSION (OUTPATIENT)
Dept: HOME HEALTH SERVICES | Facility: HOME HEALTH | Age: 86
End: 2021-01-01
Payer: MEDICARE

## 2021-01-01 VITALS
SYSTOLIC BLOOD PRESSURE: 165 MMHG | OXYGEN SATURATION: 94 % | WEIGHT: 130 LBS | HEART RATE: 74 BPM | DIASTOLIC BLOOD PRESSURE: 72 MMHG | BODY MASS INDEX: 23.04 KG/M2 | TEMPERATURE: 98 F | RESPIRATION RATE: 19 BRPM | HEIGHT: 63 IN

## 2021-01-01 VITALS
DIASTOLIC BLOOD PRESSURE: 68 MMHG | SYSTOLIC BLOOD PRESSURE: 112 MMHG | HEART RATE: 69 BPM | OXYGEN SATURATION: 98 % | TEMPERATURE: 98.2 F | RESPIRATION RATE: 18 BRPM

## 2021-01-01 VITALS
OXYGEN SATURATION: 97 % | SYSTOLIC BLOOD PRESSURE: 118 MMHG | HEART RATE: 60 BPM | TEMPERATURE: 98 F | DIASTOLIC BLOOD PRESSURE: 65 MMHG | RESPIRATION RATE: 18 BRPM

## 2021-01-01 VITALS
HEART RATE: 68 BPM | RESPIRATION RATE: 18 BRPM | SYSTOLIC BLOOD PRESSURE: 106 MMHG | OXYGEN SATURATION: 97 % | DIASTOLIC BLOOD PRESSURE: 60 MMHG | TEMPERATURE: 97.6 F

## 2021-01-01 VITALS
OXYGEN SATURATION: 98 % | DIASTOLIC BLOOD PRESSURE: 62 MMHG | HEART RATE: 62 BPM | SYSTOLIC BLOOD PRESSURE: 110 MMHG | RESPIRATION RATE: 18 BRPM | TEMPERATURE: 98.2 F

## 2021-01-01 VITALS
TEMPERATURE: 98 F | RESPIRATION RATE: 18 BRPM | DIASTOLIC BLOOD PRESSURE: 60 MMHG | OXYGEN SATURATION: 96 % | HEART RATE: 68 BPM | SYSTOLIC BLOOD PRESSURE: 98 MMHG

## 2021-01-01 VITALS
RESPIRATION RATE: 16 BRPM | OXYGEN SATURATION: 95 % | HEART RATE: 64 BPM | WEIGHT: 129.8 LBS | BODY MASS INDEX: 23 KG/M2 | HEIGHT: 63 IN | DIASTOLIC BLOOD PRESSURE: 64 MMHG | TEMPERATURE: 98.3 F | SYSTOLIC BLOOD PRESSURE: 112 MMHG

## 2021-01-01 VITALS
TEMPERATURE: 97.7 F | OXYGEN SATURATION: 98 % | DIASTOLIC BLOOD PRESSURE: 68 MMHG | RESPIRATION RATE: 18 BRPM | SYSTOLIC BLOOD PRESSURE: 138 MMHG | HEART RATE: 67 BPM

## 2021-01-01 VITALS
TEMPERATURE: 97 F | SYSTOLIC BLOOD PRESSURE: 118 MMHG | DIASTOLIC BLOOD PRESSURE: 68 MMHG | HEART RATE: 66 BPM | OXYGEN SATURATION: 97 % | RESPIRATION RATE: 16 BRPM

## 2021-01-01 VITALS — RESPIRATION RATE: 18 BRPM | TEMPERATURE: 98.1 F | SYSTOLIC BLOOD PRESSURE: 125 MMHG | DIASTOLIC BLOOD PRESSURE: 70 MMHG

## 2021-01-01 VITALS
RESPIRATION RATE: 18 BRPM | SYSTOLIC BLOOD PRESSURE: 115 MMHG | DIASTOLIC BLOOD PRESSURE: 68 MMHG | HEART RATE: 78 BPM | TEMPERATURE: 98 F | OXYGEN SATURATION: 96 %

## 2021-01-01 VITALS
TEMPERATURE: 98 F | SYSTOLIC BLOOD PRESSURE: 121 MMHG | TEMPERATURE: 98.2 F | DIASTOLIC BLOOD PRESSURE: 66 MMHG | OXYGEN SATURATION: 94 % | DIASTOLIC BLOOD PRESSURE: 70 MMHG | SYSTOLIC BLOOD PRESSURE: 109 MMHG | OXYGEN SATURATION: 94 % | WEIGHT: 127 LBS | WEIGHT: 127.6 LBS | BODY MASS INDEX: 22.61 KG/M2 | HEIGHT: 63 IN | HEART RATE: 60 BPM | HEIGHT: 63 IN | RESPIRATION RATE: 14 BRPM | HEART RATE: 68 BPM | RESPIRATION RATE: 16 BRPM | BODY MASS INDEX: 22.5 KG/M2

## 2021-01-01 VITALS
HEIGHT: 63 IN | OXYGEN SATURATION: 95 % | SYSTOLIC BLOOD PRESSURE: 117 MMHG | TEMPERATURE: 98 F | RESPIRATION RATE: 14 BRPM | BODY MASS INDEX: 22.32 KG/M2 | HEART RATE: 65 BPM | WEIGHT: 126 LBS | DIASTOLIC BLOOD PRESSURE: 67 MMHG

## 2021-01-01 VITALS
DIASTOLIC BLOOD PRESSURE: 60 MMHG | SYSTOLIC BLOOD PRESSURE: 100 MMHG | RESPIRATION RATE: 18 BRPM | TEMPERATURE: 97.8 F | HEART RATE: 68 BPM | OXYGEN SATURATION: 98 %

## 2021-01-01 VITALS
DIASTOLIC BLOOD PRESSURE: 60 MMHG | SYSTOLIC BLOOD PRESSURE: 120 MMHG | TEMPERATURE: 98.5 F | HEART RATE: 68 BPM | OXYGEN SATURATION: 96 % | RESPIRATION RATE: 20 BRPM

## 2021-01-01 VITALS
DIASTOLIC BLOOD PRESSURE: 66 MMHG | HEART RATE: 68 BPM | SYSTOLIC BLOOD PRESSURE: 110 MMHG | RESPIRATION RATE: 18 BRPM | OXYGEN SATURATION: 96 % | TEMPERATURE: 97.4 F

## 2021-01-01 VITALS
SYSTOLIC BLOOD PRESSURE: 120 MMHG | DIASTOLIC BLOOD PRESSURE: 68 MMHG | TEMPERATURE: 97.9 F | OXYGEN SATURATION: 96 % | RESPIRATION RATE: 12 BRPM | HEART RATE: 67 BPM

## 2021-01-01 VITALS
DIASTOLIC BLOOD PRESSURE: 70 MMHG | OXYGEN SATURATION: 98 % | HEART RATE: 74 BPM | TEMPERATURE: 98.4 F | SYSTOLIC BLOOD PRESSURE: 115 MMHG | RESPIRATION RATE: 18 BRPM

## 2021-01-01 VITALS
DIASTOLIC BLOOD PRESSURE: 65 MMHG | HEART RATE: 65 BPM | OXYGEN SATURATION: 95 % | RESPIRATION RATE: 18 BRPM | TEMPERATURE: 98.4 F | SYSTOLIC BLOOD PRESSURE: 112 MMHG

## 2021-01-01 DIAGNOSIS — K21.9 GERD WITHOUT ESOPHAGITIS: ICD-10-CM

## 2021-01-01 DIAGNOSIS — D51.0 PERNICIOUS ANEMIA: ICD-10-CM

## 2021-01-01 DIAGNOSIS — I10 HTN, GOAL BELOW 130/80: ICD-10-CM

## 2021-01-01 DIAGNOSIS — M48.061 FORAMINAL STENOSIS OF LUMBAR REGION: ICD-10-CM

## 2021-01-01 DIAGNOSIS — M54.6 THORACIC SPINE PAIN: ICD-10-CM

## 2021-01-01 DIAGNOSIS — L08.9 INFECTED SKIN TEAR: ICD-10-CM

## 2021-01-01 DIAGNOSIS — R39.15 URINARY URGENCY: ICD-10-CM

## 2021-01-01 DIAGNOSIS — N39.0 URINARY TRACT INFECTION WITHOUT HEMATURIA, SITE UNSPECIFIED: ICD-10-CM

## 2021-01-01 DIAGNOSIS — E23.6 PITUITARY CYST (HCC): ICD-10-CM

## 2021-01-01 DIAGNOSIS — B37.89 CANDIDIASIS OF BREAST: Primary | ICD-10-CM

## 2021-01-01 DIAGNOSIS — R42 DISEQUILIBRIUM: ICD-10-CM

## 2021-01-01 DIAGNOSIS — G89.4 CHRONIC PAIN DISORDER: ICD-10-CM

## 2021-01-01 DIAGNOSIS — R41.3 MEMORY LOSS OR IMPAIRMENT: ICD-10-CM

## 2021-01-01 DIAGNOSIS — S22.000A THORACIC COMPRESSION FRACTURE, CLOSED, INITIAL ENCOUNTER (HCC): Primary | ICD-10-CM

## 2021-01-01 DIAGNOSIS — Z23 ENCOUNTER FOR IMMUNIZATION: Primary | ICD-10-CM

## 2021-01-01 DIAGNOSIS — S22.000A COMPRESSION FRACTURE OF BODY OF THORACIC VERTEBRA (HCC): Primary | ICD-10-CM

## 2021-01-01 DIAGNOSIS — N76.1 SUBACUTE VAGINITIS: Primary | ICD-10-CM

## 2021-01-01 DIAGNOSIS — S22.080A COMPRESSION FRACTURE OF T12 VERTEBRA, INITIAL ENCOUNTER (HCC): Primary | ICD-10-CM

## 2021-01-01 DIAGNOSIS — T14.8XXA INFECTED SKIN TEAR: ICD-10-CM

## 2021-01-01 DIAGNOSIS — R29.6 FREQUENT FALLS: ICD-10-CM

## 2021-01-01 DIAGNOSIS — Z22.322 MRSA CARRIER: ICD-10-CM

## 2021-01-01 DIAGNOSIS — N89.8 VAGINAL ITCHING: Primary | ICD-10-CM

## 2021-01-01 DIAGNOSIS — R53.83 OTHER FATIGUE: Primary | ICD-10-CM

## 2021-01-01 LAB
A VAGINAE DNA VAG QL NAA+PROBE: NORMAL SCORE
ALBUMIN SERPL-MCNC: 3.6 G/DL (ref 3.5–5)
ALBUMIN/GLOB SERPL: 1.3 {RATIO} (ref 1.1–2.2)
ALP SERPL-CCNC: 101 U/L (ref 45–117)
ALT SERPL-CCNC: 16 U/L (ref 12–78)
ANION GAP SERPL CALC-SCNC: 8 MMOL/L (ref 5–15)
APPEARANCE UR: ABNORMAL
AST SERPL-CCNC: 23 U/L (ref 15–37)
BACTERIA SPEC CULT: ABNORMAL
BACTERIA UR CULT: NORMAL
BACTERIA URNS QL MICRO: ABNORMAL /HPF
BASOPHILS # BLD: 0 K/UL (ref 0–0.1)
BASOPHILS NFR BLD: 1 % (ref 0–1)
BILIRUB SERPL-MCNC: 0.3 MG/DL (ref 0.2–1)
BILIRUB UR QL STRIP: NORMAL
BILIRUB UR QL: NEGATIVE
BUN SERPL-MCNC: 24 MG/DL (ref 6–20)
BUN/CREAT SERPL: 29 (ref 12–20)
BVAB2 DNA VAG QL NAA+PROBE: NORMAL SCORE
C ALBICANS DNA VAG QL NAA+PROBE: NEGATIVE
C GLABRATA DNA VAG QL NAA+PROBE: NEGATIVE
CALCIUM SERPL-MCNC: 8 MG/DL (ref 8.5–10.1)
CC UR VC: ABNORMAL
CHLORIDE SERPL-SCNC: 109 MMOL/L (ref 97–108)
CO2 SERPL-SCNC: 23 MMOL/L (ref 21–32)
COLOR UR: ABNORMAL
CREAT SERPL-MCNC: 0.82 MG/DL (ref 0.55–1.02)
DIFFERENTIAL METHOD BLD: ABNORMAL
EOSINOPHIL # BLD: 0.1 K/UL (ref 0–0.4)
EOSINOPHIL NFR BLD: 2 % (ref 0–7)
EPITH CASTS URNS QL MICRO: ABNORMAL /LPF
ERYTHROCYTE [DISTWIDTH] IN BLOOD BY AUTOMATED COUNT: 13.8 % (ref 11.5–14.5)
GLOBULIN SER CALC-MCNC: 2.8 G/DL (ref 2–4)
GLUCOSE SERPL-MCNC: 123 MG/DL (ref 65–100)
GLUCOSE UR STRIP.AUTO-MCNC: NEGATIVE MG/DL
GLUCOSE UR-MCNC: NEGATIVE MG/DL
HCT VFR BLD AUTO: 33.7 % (ref 35–47)
HGB BLD-MCNC: 10.2 G/DL (ref 11.5–16)
HGB UR QL STRIP: NEGATIVE
IMM GRANULOCYTES # BLD AUTO: 0 K/UL (ref 0–0.04)
IMM GRANULOCYTES NFR BLD AUTO: 0 % (ref 0–0.5)
KETONES P FAST UR STRIP-MCNC: NORMAL MG/DL
KETONES UR QL STRIP.AUTO: NEGATIVE MG/DL
LEUKOCYTE ESTERASE UR QL STRIP.AUTO: NEGATIVE
LYMPHOCYTES # BLD: 1.1 K/UL (ref 0.8–3.5)
LYMPHOCYTES NFR BLD: 17 % (ref 12–49)
MCH RBC QN AUTO: 30.6 PG (ref 26–34)
MCHC RBC AUTO-ENTMCNC: 30.3 G/DL (ref 30–36.5)
MCV RBC AUTO: 101.2 FL (ref 80–99)
MEGA1 DNA VAG QL NAA+PROBE: NORMAL SCORE
MONOCYTES # BLD: 0.4 K/UL (ref 0–1)
MONOCYTES NFR BLD: 6 % (ref 5–13)
NEUTS SEG # BLD: 4.7 K/UL (ref 1.8–8)
NEUTS SEG NFR BLD: 74 % (ref 32–75)
NITRITE UR QL STRIP.AUTO: NEGATIVE
NRBC # BLD: 0 K/UL (ref 0–0.01)
NRBC BLD-RTO: 0 PER 100 WBC
PH UR STRIP: 5 [PH] (ref 4.6–8)
PH UR STRIP: 6 [PH] (ref 5–8)
PLATELET # BLD AUTO: 223 K/UL (ref 150–400)
PMV BLD AUTO: 11.7 FL (ref 8.9–12.9)
POTASSIUM SERPL-SCNC: 4.4 MMOL/L (ref 3.5–5.1)
PROT SERPL-MCNC: 6.4 G/DL (ref 6.4–8.2)
PROT UR QL STRIP: NEGATIVE
PROT UR STRIP-MCNC: NEGATIVE MG/DL
RBC # BLD AUTO: 3.33 M/UL (ref 3.8–5.2)
RBC #/AREA URNS HPF: ABNORMAL /HPF (ref 0–5)
SERVICE CMNT-IMP: ABNORMAL
SODIUM SERPL-SCNC: 140 MMOL/L (ref 136–145)
SP GR UR REFRACTOMETRY: 1.01 (ref 1–1.03)
SP GR UR STRIP: 1.02 (ref 1–1.03)
T VAGINALIS DNA VAG QL NAA+PROBE: NEGATIVE
TSH SERPL DL<=0.05 MIU/L-ACNC: 1.27 UIU/ML (ref 0.36–3.74)
UA UROBILINOGEN AMB POC: NORMAL (ref 0.2–1)
UR CULT HOLD, URHOLD: NORMAL
URINALYSIS CLARITY POC: CLEAR
URINALYSIS COLOR POC: YELLOW
URINE BLOOD POC: NEGATIVE
URINE LEUKOCYTES POC: NORMAL
URINE NITRITES POC: NEGATIVE
UROBILINOGEN UR QL STRIP.AUTO: 0.2 EU/DL (ref 0.2–1)
VIT B12 SERPL-MCNC: 1164 PG/ML (ref 193–986)
WBC # BLD AUTO: 6.3 K/UL (ref 3.6–11)
WBC URNS QL MICRO: ABNORMAL /HPF (ref 0–4)

## 2021-01-01 PROCEDURE — G0152 HHCP-SERV OF OT,EA 15 MIN: HCPCS

## 2021-01-01 PROCEDURE — 99442 PR PHYS/QHP TELEPHONE EVALUATION 11-20 MIN: CPT | Performed by: INTERNAL MEDICINE

## 2021-01-01 PROCEDURE — 99213 OFFICE O/P EST LOW 20 MIN: CPT | Performed by: INTERNAL MEDICINE

## 2021-01-01 PROCEDURE — 400018 HH-NO PAY CLAIM PROCEDURE

## 2021-01-01 PROCEDURE — 1101F PT FALLS ASSESS-DOCD LE1/YR: CPT | Performed by: NURSE PRACTITIONER

## 2021-01-01 PROCEDURE — G0157 HHC PT ASSISTANT EA 15: HCPCS

## 2021-01-01 PROCEDURE — 1090F PRES/ABSN URINE INCON ASSESS: CPT | Performed by: INTERNAL MEDICINE

## 2021-01-01 PROCEDURE — 1090F PRES/ABSN URINE INCON ASSESS: CPT | Performed by: NURSE PRACTITIONER

## 2021-01-01 PROCEDURE — 1101F PT FALLS ASSESS-DOCD LE1/YR: CPT | Performed by: INTERNAL MEDICINE

## 2021-01-01 PROCEDURE — G8432 DEP SCR NOT DOC, RNG: HCPCS | Performed by: NURSE PRACTITIONER

## 2021-01-01 PROCEDURE — G8420 CALC BMI NORM PARAMETERS: HCPCS | Performed by: INTERNAL MEDICINE

## 2021-01-01 PROCEDURE — 91301 COVID-19, MRNA, LNP-S, PF, 100MCG/0.5ML DOSE(MODERNA): CPT | Performed by: FAMILY MEDICINE

## 2021-01-01 PROCEDURE — 99283 EMERGENCY DEPT VISIT LOW MDM: CPT

## 2021-01-01 PROCEDURE — G8427 DOCREV CUR MEDS BY ELIG CLIN: HCPCS | Performed by: NURSE PRACTITIONER

## 2021-01-01 PROCEDURE — 99214 OFFICE O/P EST MOD 30 MIN: CPT | Performed by: INTERNAL MEDICINE

## 2021-01-01 PROCEDURE — 81001 URINALYSIS AUTO W/SCOPE: CPT | Performed by: INTERNAL MEDICINE

## 2021-01-01 PROCEDURE — G0151 HHCP-SERV OF PT,EA 15 MIN: HCPCS

## 2021-01-01 PROCEDURE — G8427 DOCREV CUR MEDS BY ELIG CLIN: HCPCS | Performed by: INTERNAL MEDICINE

## 2021-01-01 PROCEDURE — 81001 URINALYSIS AUTO W/SCOPE: CPT

## 2021-01-01 PROCEDURE — G8536 NO DOC ELDER MAL SCRN: HCPCS | Performed by: NURSE PRACTITIONER

## 2021-01-01 PROCEDURE — G8420 CALC BMI NORM PARAMETERS: HCPCS | Performed by: NURSE PRACTITIONER

## 2021-01-01 PROCEDURE — 74011250637 HC RX REV CODE- 250/637: Performed by: EMERGENCY MEDICINE

## 2021-01-01 PROCEDURE — 0012A COVID-19, MRNA, LNP-S, PF, 100MCG/0.5ML DOSE(MODERNA): CPT | Performed by: FAMILY MEDICINE

## 2021-01-01 PROCEDURE — G8536 NO DOC ELDER MAL SCRN: HCPCS | Performed by: INTERNAL MEDICINE

## 2021-01-01 PROCEDURE — 400013 HH SOC

## 2021-01-01 PROCEDURE — 99214 OFFICE O/P EST MOD 30 MIN: CPT | Performed by: NURSE PRACTITIONER

## 2021-01-01 PROCEDURE — G8510 SCR DEP NEG, NO PLAN REQD: HCPCS | Performed by: INTERNAL MEDICINE

## 2021-01-01 PROCEDURE — G8432 DEP SCR NOT DOC, RNG: HCPCS | Performed by: INTERNAL MEDICINE

## 2021-01-01 PROCEDURE — 72131 CT LUMBAR SPINE W/O DYE: CPT

## 2021-01-01 RX ORDER — GABAPENTIN 100 MG/1
CAPSULE ORAL
Qty: 120 CAPSULE | Refills: 1 | Status: SHIPPED | OUTPATIENT
Start: 2021-01-01 | End: 2021-01-01 | Stop reason: SDUPTHER

## 2021-01-01 RX ORDER — OLMESARTAN MEDOXOMIL 40 MG/1
TABLET ORAL
Qty: 30 TABLET | Refills: 4 | Status: SHIPPED | OUTPATIENT
Start: 2021-01-01 | End: 2021-01-01

## 2021-01-01 RX ORDER — CYCLOBENZAPRINE HCL 10 MG
5 TABLET ORAL
Qty: 20 TABLET | Refills: 0 | Status: SHIPPED | OUTPATIENT
Start: 2021-01-01 | End: 2021-01-01

## 2021-01-01 RX ORDER — HYDROCODONE BITARTRATE AND ACETAMINOPHEN 5; 325 MG/1; MG/1
1 TABLET ORAL DAILY
Qty: 30 TABLET | Refills: 0 | Status: SHIPPED | OUTPATIENT
Start: 2021-01-01 | End: 2021-01-01

## 2021-01-01 RX ORDER — GABAPENTIN 100 MG/1
200 CAPSULE ORAL 2 TIMES DAILY
Qty: 120 CAPSULE | Refills: 3 | Status: SHIPPED | OUTPATIENT
Start: 2021-01-01 | End: 2022-01-01 | Stop reason: DRUGHIGH

## 2021-01-01 RX ORDER — CYANOCOBALAMIN 1000 UG/ML
1000 INJECTION, SOLUTION INTRAMUSCULAR; SUBCUTANEOUS
Qty: 1 ML | Refills: 5 | Status: SHIPPED | OUTPATIENT
Start: 2021-01-01

## 2021-01-01 RX ORDER — GABAPENTIN 100 MG/1
CAPSULE ORAL
Qty: 120 CAP | Refills: 2 | Status: SHIPPED | OUTPATIENT
Start: 2021-01-01 | End: 2021-01-01

## 2021-01-01 RX ORDER — TRAMADOL HYDROCHLORIDE 50 MG/1
TABLET ORAL
Qty: 90 TAB | Refills: 1 | Status: SHIPPED | OUTPATIENT
Start: 2021-01-01 | End: 2021-01-01

## 2021-01-01 RX ORDER — GABAPENTIN 100 MG/1
200 CAPSULE ORAL 2 TIMES DAILY
Qty: 120 CAPSULE | Refills: 0 | Status: SHIPPED | OUTPATIENT
Start: 2021-01-01 | End: 2021-01-01 | Stop reason: SDUPTHER

## 2021-01-01 RX ORDER — CYCLOBENZAPRINE HCL 10 MG
10 TABLET ORAL
Status: COMPLETED | OUTPATIENT
Start: 2021-01-01 | End: 2021-01-01

## 2021-01-01 RX ORDER — GABAPENTIN 100 MG/1
200 CAPSULE ORAL 2 TIMES DAILY
Qty: 120 CAPSULE | Refills: 3 | Status: SHIPPED | OUTPATIENT
Start: 2021-01-01 | End: 2021-01-01

## 2021-01-01 RX ORDER — TERCONAZOLE 4 MG/G
1 CREAM VAGINAL
Qty: 45 G | Refills: 0 | Status: ON HOLD | OUTPATIENT
Start: 2021-01-01 | End: 2022-01-01

## 2021-01-01 RX ORDER — CYCLOBENZAPRINE HCL 5 MG
2.5 TABLET ORAL
Qty: 30 TABLET | Refills: 3 | Status: SHIPPED | OUTPATIENT
Start: 2021-01-01 | End: 2021-01-01 | Stop reason: ALTCHOICE

## 2021-01-01 RX ORDER — NITROFURANTOIN 25; 75 MG/1; MG/1
100 CAPSULE ORAL 2 TIMES DAILY
Qty: 14 CAP | Refills: 0 | Status: SHIPPED | OUTPATIENT
Start: 2021-01-01 | End: 2021-01-01

## 2021-01-01 RX ORDER — HYDROCODONE BITARTRATE AND ACETAMINOPHEN 5; 325 MG/1; MG/1
1 TABLET ORAL
Qty: 12 TABLET | Refills: 0 | Status: SHIPPED | OUTPATIENT
Start: 2021-01-01 | End: 2021-01-01

## 2021-01-01 RX ORDER — OMEPRAZOLE 20 MG/1
CAPSULE, DELAYED RELEASE ORAL
Qty: 30 CAPSULE | Refills: 5 | Status: SHIPPED | OUTPATIENT
Start: 2021-01-01

## 2021-01-01 RX ORDER — MUPIROCIN 20 MG/G
OINTMENT TOPICAL 2 TIMES DAILY
Qty: 22 G | Refills: 0 | Status: SHIPPED | OUTPATIENT
Start: 2021-01-01 | End: 2021-01-01 | Stop reason: ALTCHOICE

## 2021-01-01 RX ORDER — AMLODIPINE BESYLATE 10 MG/1
TABLET ORAL
Qty: 90 TABLET | Refills: 1 | Status: SHIPPED | OUTPATIENT
Start: 2021-01-01 | End: 2022-01-01

## 2021-01-01 RX ORDER — KETOCONAZOLE 20 MG/G
CREAM TOPICAL 2 TIMES DAILY
Qty: 60 G | Refills: 0 | Status: ON HOLD | OUTPATIENT
Start: 2021-01-01 | End: 2022-01-01

## 2021-01-01 RX ORDER — CYCLOBENZAPRINE HCL 10 MG
TABLET ORAL
COMMUNITY
End: 2021-01-01 | Stop reason: DRUGHIGH

## 2021-01-01 RX ORDER — HYDROCODONE BITARTRATE AND ACETAMINOPHEN 5; 325 MG/1; MG/1
TABLET ORAL
Status: ON HOLD | COMMUNITY
End: 2022-01-01

## 2021-01-01 RX ORDER — MEMANTINE HYDROCHLORIDE 21 MG/1
CAPSULE, EXTENDED RELEASE ORAL
Qty: 90 CAPSULE | Refills: 3 | Status: ON HOLD | OUTPATIENT
Start: 2021-01-01 | End: 2022-01-01

## 2021-01-01 RX ORDER — MECLIZINE HCL 12.5 MG 12.5 MG/1
12.5 TABLET ORAL
Qty: 30 TABLET | Refills: 1 | Status: SHIPPED | OUTPATIENT
Start: 2021-01-01 | End: 2021-01-01

## 2021-01-01 RX ORDER — OLMESARTAN MEDOXOMIL 40 MG/1
TABLET ORAL
Qty: 30 TABLET | Refills: 5 | Status: SHIPPED | OUTPATIENT
Start: 2021-01-01

## 2021-01-01 RX ORDER — METOPROLOL TARTRATE 25 MG/1
12.5 TABLET, FILM COATED ORAL 2 TIMES DAILY
Qty: 60 TABLET | Refills: 5 | Status: SHIPPED | OUTPATIENT
Start: 2021-01-01

## 2021-01-01 RX ORDER — HYDROCODONE BITARTRATE AND ACETAMINOPHEN 5; 325 MG/1; MG/1
1 TABLET ORAL
Status: COMPLETED | OUTPATIENT
Start: 2021-01-01 | End: 2021-01-01

## 2021-01-01 RX ORDER — MUPIROCIN 20 MG/G
OINTMENT TOPICAL DAILY
Qty: 22 G | Refills: 0 | Status: SHIPPED | OUTPATIENT
Start: 2021-01-01 | End: 2021-01-01 | Stop reason: CLARIF

## 2021-01-01 RX ORDER — HYDROCODONE BITARTRATE AND ACETAMINOPHEN 5; 325 MG/1; MG/1
TABLET ORAL
COMMUNITY
End: 2021-01-01 | Stop reason: SDUPTHER

## 2021-01-01 RX ORDER — CEPHALEXIN 250 MG/1
250 CAPSULE ORAL 3 TIMES DAILY
Qty: 21 CAPSULE | Refills: 0 | Status: SHIPPED | OUTPATIENT
Start: 2021-01-01 | End: 2021-01-01 | Stop reason: ALTCHOICE

## 2021-01-01 RX ORDER — TRAMADOL HYDROCHLORIDE 50 MG/1
TABLET ORAL
Qty: 90 TABLET | Refills: 1 | Status: SHIPPED | OUTPATIENT
Start: 2021-01-01 | End: 2021-01-01

## 2021-01-01 RX ORDER — OLMESARTAN MEDOXOMIL 40 MG/1
TABLET ORAL
Qty: 30 TAB | Refills: 4 | Status: SHIPPED | OUTPATIENT
Start: 2021-01-01 | End: 2021-01-01

## 2021-01-01 RX ORDER — CYANOCOBALAMIN 1000 UG/ML
INJECTION, SOLUTION INTRAMUSCULAR; SUBCUTANEOUS
Qty: 3 ML | Refills: 1 | Status: SHIPPED | OUTPATIENT
Start: 2021-01-01 | End: 2021-01-01 | Stop reason: SDUPTHER

## 2021-01-01 RX ORDER — METHOCARBAMOL 500 MG/1
500 TABLET, FILM COATED ORAL
Qty: 30 TABLET | Refills: 0 | Status: SHIPPED | OUTPATIENT
Start: 2021-01-01 | End: 2021-01-01 | Stop reason: ALTCHOICE

## 2021-01-01 RX ORDER — OMEPRAZOLE 20 MG/1
CAPSULE, DELAYED RELEASE ORAL
Qty: 90 CAPSULE | Refills: 1 | Status: SHIPPED | OUTPATIENT
Start: 2021-01-01 | End: 2021-01-01

## 2021-01-01 RX ADMIN — HYDROCODONE BITARTRATE AND ACETAMINOPHEN 1 TABLET: 5; 325 TABLET ORAL at 15:58

## 2021-01-01 RX ADMIN — CYCLOBENZAPRINE 10 MG: 10 TABLET, FILM COATED ORAL at 15:58

## 2021-01-06 DIAGNOSIS — I10 HTN, GOAL BELOW 130/80: ICD-10-CM

## 2021-01-07 RX ORDER — OLMESARTAN MEDOXOMIL 40 MG/1
TABLET ORAL
Qty: 30 TAB | Refills: 1 | Status: SHIPPED | OUTPATIENT
Start: 2021-01-07 | End: 2021-02-03

## 2021-01-20 DIAGNOSIS — K21.9 GERD WITHOUT ESOPHAGITIS: ICD-10-CM

## 2021-01-20 RX ORDER — OMEPRAZOLE 20 MG/1
CAPSULE, DELAYED RELEASE ORAL
Qty: 90 CAP | Refills: 1 | Status: SHIPPED | OUTPATIENT
Start: 2021-01-20 | End: 2021-01-01

## 2021-01-25 ENCOUNTER — OFFICE VISIT (OUTPATIENT)
Dept: INTERNAL MEDICINE CLINIC | Age: 86
End: 2021-01-25
Payer: MEDICARE

## 2021-01-25 VITALS
HEART RATE: 71 BPM | WEIGHT: 130 LBS | TEMPERATURE: 98.1 F | HEIGHT: 63 IN | BODY MASS INDEX: 23.04 KG/M2 | RESPIRATION RATE: 18 BRPM | SYSTOLIC BLOOD PRESSURE: 104 MMHG | DIASTOLIC BLOOD PRESSURE: 66 MMHG | OXYGEN SATURATION: 95 %

## 2021-01-25 DIAGNOSIS — Z00.00 MEDICARE ANNUAL WELLNESS VISIT, SUBSEQUENT: ICD-10-CM

## 2021-01-25 DIAGNOSIS — I10 HTN, GOAL BELOW 130/80: Primary | ICD-10-CM

## 2021-01-25 DIAGNOSIS — R29.6 FREQUENT FALLS: ICD-10-CM

## 2021-01-25 DIAGNOSIS — D51.0 PERNICIOUS ANEMIA: ICD-10-CM

## 2021-01-25 DIAGNOSIS — G89.4 CHRONIC PAIN DISORDER: ICD-10-CM

## 2021-01-25 PROCEDURE — G8510 SCR DEP NEG, NO PLAN REQD: HCPCS | Performed by: INTERNAL MEDICINE

## 2021-01-25 PROCEDURE — 96372 THER/PROPH/DIAG INJ SC/IM: CPT | Performed by: INTERNAL MEDICINE

## 2021-01-25 PROCEDURE — G8420 CALC BMI NORM PARAMETERS: HCPCS | Performed by: INTERNAL MEDICINE

## 2021-01-25 PROCEDURE — G0439 PPPS, SUBSEQ VISIT: HCPCS | Performed by: INTERNAL MEDICINE

## 2021-01-25 PROCEDURE — G8536 NO DOC ELDER MAL SCRN: HCPCS | Performed by: INTERNAL MEDICINE

## 2021-01-25 PROCEDURE — G8427 DOCREV CUR MEDS BY ELIG CLIN: HCPCS | Performed by: INTERNAL MEDICINE

## 2021-01-25 PROCEDURE — 99214 OFFICE O/P EST MOD 30 MIN: CPT | Performed by: INTERNAL MEDICINE

## 2021-01-25 PROCEDURE — 1090F PRES/ABSN URINE INCON ASSESS: CPT | Performed by: INTERNAL MEDICINE

## 2021-01-25 PROCEDURE — 1101F PT FALLS ASSESS-DOCD LE1/YR: CPT | Performed by: INTERNAL MEDICINE

## 2021-01-25 RX ORDER — CYANOCOBALAMIN 1000 UG/ML
1000 INJECTION, SOLUTION INTRAMUSCULAR; SUBCUTANEOUS ONCE
Qty: 1 ML | Refills: 0
Start: 2021-01-25 | End: 2021-01-01 | Stop reason: SDUPTHER

## 2021-01-25 NOTE — PROGRESS NOTES
HISTORY OF PRESENT ILLNESS Kolton Saavedra is a 80 y.o. female. HPI Seen with her daughter for wellness review and follow up. Issues: 
1. Anemia. She is on B12 shots monthly at home and iron tablet daily. Tolerates this well. Recent hemoglobin in the ER was 10.8, which is improved for her. 2. Falls. She has had two falls recently, which occurred when she was bending forward, trying to get something in a drawer, wound up falling backwards and landing on her backside. Fortunately, no injuries. We have discussed fall avoidance. 3. Hypertension, on Amlodipine 10, Olmesartan 40 and Metoprolol 25. Blood pressure looks good. 4. Memory loss. Good and bad days. She remains on the Namenda 21 mg. Has not had any hallucinations or issues with wandering. Review of Systems Constitutional: Negative for chills, fever and weight loss. HENT: Positive for hearing loss. Respiratory: Negative for cough, shortness of breath and wheezing. Cardiovascular: Negative for chest pain, palpitations, orthopnea, leg swelling and PND. Gastrointestinal: Negative for heartburn and nausea. Musculoskeletal: Positive for falls. Negative for myalgias. Neurological: Negative for dizziness and headaches. Psychiatric/Behavioral: Positive for memory loss. Physical Exam 
Vitals signs and nursing note reviewed. Constitutional:   
   Appearance: She is well-developed. HENT:  
   Head: Normocephalic and atraumatic. Neck: Musculoskeletal: Normal range of motion and neck supple. Thyroid: No thyromegaly. Vascular: No carotid bruit. Cardiovascular:  
   Rate and Rhythm: Normal rate and regular rhythm. Heart sounds: Normal heart sounds, S1 normal and S2 normal. No murmur. Pulmonary:  
   Effort: Pulmonary effort is normal. No respiratory distress. Breath sounds: Normal breath sounds. No wheezing or rales. Skin: 
   Findings: No rash. Neurological:  
   Mental Status: She is alert. Psychiatric:     
   Behavior: Behavior normal.  
 
 
 
ASSESSMENT and PLAN Diagnoses and all orders for this visit: 1. HTN, goal below 130/80-stable on meds 2. Chronic pain disorder-recent spine injections times 2 helped with back pain 3. Pernicious anemia-cont shots at home and one today as due 
-     VITAMIN B12 INJECTION 
-     THER/PROPH/DIAG INJECTION, SUBCUT/IM 
-     cyanocobalamin (Vitamin B-12) 1,000 mcg/mL injection; 1 mL by IntraMUSCular route once for 1 dose. 4. Frequent falls-discussed fall prevention 5. Medicare annual wellness visit, subsequent 
 
covid vaccine appt set up This is the Subsequent Medicare Annual Wellness Exam, performed 12 months or more after the Initial AWV or the last Subsequent AWV I have reviewed the patient's medical history in detail and updated the computerized patient record. Depression Risk Factor Screening:  
 
3 most recent PHQ Screens 1/25/2021 Little interest or pleasure in doing things Not at all Feeling down, depressed, irritable, or hopeless Not at all Total Score PHQ 2 0 Alcohol Risk Screen Do you average more than 1 drink per night or more than 7 drinks a week:  No 
 
On any one occasion in the past three months have you have had more than 3 drinks containing alcohol:  No 
 
 
 
Functional Ability and Level of Safety:  
 Hearing: The patient wears hearing aids. Activities of Daily Living: The home contains: no safety equipment. Patient needs help with:  transportation, shopping, managing medications and managing money Ambulation: with mild difficulty Fall Risk: 
Fall Risk Assessment, last 12 mths 1/25/2021 Able to walk? Yes Fall in past 12 months? 1 Do you feel unsteady? 1 Are you worried about falling 1 Is the gait abnormal? 0 Number of falls in past 12 months 1 Fall with injury? 0 Abuse Screen: 
Patient is not abused Cognitive Screening Has your family/caregiver stated any concerns about your memory: yes - has memory loss diagnosis Assessment/Plan Education and counseling provided: 
Are appropriate based on today's review and evaluation Pneumococcal Vaccine Influenza Vaccine 
covid vaccine Diagnoses and all orders for this visit: 1. HTN, goal below 130/80 2. Chronic pain disorder 3. Pernicious anemia 
-     VITAMIN B12 INJECTION 
-     THER/PROPH/DIAG INJECTION, SUBCUT/IM 
-     cyanocobalamin (Vitamin B-12) 1,000 mcg/mL injection; 1 mL by IntraMUSCular route once for 1 dose. 4. Frequent falls 5. Medicare annual wellness visit, subsequent Health Maintenance Due Health Maintenance Due Topic Date Due  
 COVID-19 Vaccine (1 of 2) 07/09/1942  
 DTaP/Tdap/Td series (1 - Tdap) 07/09/1947  Shingrix Vaccine Age 50> (1 of 2) 07/09/1976  GLAUCOMA SCREENING Q2Y  07/09/1991  Bone Densitometry (Dexa) Screening  07/09/1991  Medicare Yearly Exam  11/04/2020 Patient Care Team  
Patient Care Team: 
Kemar Rocha MD as PCP - General (Internal Medicine) Kemar Rocha MD as PCP - [facility] Empaneled Provider History Patient Active Problem List  
Diagnosis Code  
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  
 H/O: hysterectomy Z90.710  Bilateral hearing loss H91.93  
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  
Diagnosis Date  Anemia  Back pain  Bilateral hearing loss 1/29/2019  H/O: hysterectomy 1/29/2019  
 Hair loss  History of double vision  Hx of diarrhea  Hx of hemorrhoids  Hypertension  Incontinence of urine  Joint pain  Joint swelling  Memory loss or impairment 1/29/2019  Muscle ache  Muscle weakness  Pernicious anemia 1/28/2019  Pituitary cyst ([facility] [address] 1/29/2019 Resection [id number] and again in 2013 benign  PUD (peptic ulcer disease) 1/29/2019 Surgery for duodenum ? Ulcer in 2009  Sleep difficulties  Stiffness in joint  Vision blurring History reviewed. No pertinent surgical history. Current Outpatient Medications Medication Sig Dispense Refill  cyanocobalamin (Vitamin B-12) 1,000 mcg/mL injection 1 mL by IntraMUSCular route once for 1 dose. 1 mL 0  
 omeprazole (PRILOSEC) 20 mg capsule TAKE 1 CAPSULE BY MOUTH EVERY DAY 90 Cap 1  
 olmesartan (BENICAR) 40 mg tablet TAKE 1 TABLET BY MOUTH EVERY DAY 30 Tab 1  
 amLODIPine (NORVASC) 10 mg tablet TAKE 1 TABLET BY MOUTH EVERY DAY 30 Tab 1  
 diclofenac (VOLTAREN) 1 % gel APPLY 4 GRAMS TO AFFECTED AREA FOUR TIMES A  g 6  
 acetaminophen (TYLENOL) 325 mg tablet Take 325 mg by mouth every four (4) hours as needed for Pain.  lidocaine (Lidoderm) 5 % Apply patch to the affected area for 12 hours a day and remove for 12 hours a day. (Patient taking differently: as needed. Apply patch to the affected area for 12 hours a day and remove for 12 hours a day.) 1 Each 0  
 gabapentin (NEURONTIN) 100 mg capsule TAKE 2 CAPSULES BY MOUTH EVERY MORNING AND TAKE 2 CAPSULES BY MOUTH AT BEDTIME 120 Cap 3  
 memantine ER (NAMENDA XR) 21 mg capsule TAKE 1 CAPSULE DAILY 90 Cap 3  cyanocobalamin (VITAMIN B12) 1,000 mcg/mL injection 1 mL by IntraMUSCular route every seven (7) days. (Patient taking differently: 1,000 mcg by IntraMUSCular route every month.) 1 Vial 5  clobetasol (TEMOVATE) 0.05 % external solution APPLY SOLUTION TWO TIMES DAILY TO ITCHY SCALP AS NEEDED  2  
 aspirin delayed-release 81 mg tablet Take 81 mg by mouth daily.  metoprolol tartrate (LOPRESSOR) 25 mg tablet Take 1 Tab by mouth two (2) times a day.     
 traMADoL (ULTRAM) 50 mg tablet TAKE 1 TABLET BY MOUTH EVERY 8 HOURS AS NEEDED FOR PAIN 90 Tab 1  
  cyanocobalamin, vitamin B-12, 1,000 mcg/mL kit 1 cc im q month 1 Kit 5  
 traMADoL (ULTRAM) 50 mg tablet Take 1 Tab by mouth every eight (8) hours as needed for Pain for up to 30 days. Max Daily Amount: 150 mg. 90 Tab 1  
 fluorouracil (EFUDEX) 5 % chemo cream Apply 1 Dose to affected area two (2) times a day.  0 Allergies Allergen Reactions  Penicillins Hives  Percocet [Oxycodone-Acetaminophen] Other (comments) Agitation  Sulfa (Sulfonamide Antibiotics) Hives Family History Problem Relation Age of Onset [facility] Other Mother  Cancer Father  Heart Disease Father Social History Tobacco Use  Smoking status: Never Smoker  Smokeless tobacco: Never Used Substance Use Topics  Alcohol use: Yes Comment: Rare

## 2021-01-25 NOTE — PROGRESS NOTES
Tosha Garcia is a 80 y.o. female Chief Complaint Patient presents with  Hypertension  Medication Evaluation  
  iron and vitamin B12 Health Maintenance Due Topic Date Due  
 COVID-19 Vaccine (1 of 2) 07/09/1942  
 DTaP/Tdap/Td series (1 - Tdap) 07/09/1947  Shingrix Vaccine Age 50> (1 of 2) 07/09/1976  GLAUCOMA SCREENING Q2Y  07/09/1991  Bone Densitometry (Dexa) Screening  07/09/1991  Medicare Yearly Exam  11/04/2020 Visit Vitals /66 (BP 1 Location: Left arm, BP Patient Position: Sitting) Pulse 71 Temp 98.1 °F (36.7 °C) (Oral) Resp 18 Ht 5' 3\" (1.6 m) Wt 130 lb (59 kg) SpO2 95% BMI 23.03 kg/m² 3 most recent PHQ Screens 1/25/2021 Little interest or pleasure in doing things Not at all Feeling down, depressed, irritable, or hopeless Not at all Total Score PHQ 2 0 Fall Risk Assessment, last 12 mths 1/25/2021 Able to walk? Yes Fall in past 12 months? 1 Do you feel unsteady? 1 Are you worried about falling 1 Is the gait abnormal? 0 Number of falls in past 12 months 1 Fall with injury? 0 No flowsheet data found. 1. Have you been to the ER, urgent care clinic since your last visit? Hospitalized since your last visit? yes urgent care for fall 2. Have you seen or consulted any other health care providers outside of the [address] [place] [address] since your last visit? Include any pap smears or colon screening. yes pain specialist

## 2021-01-25 NOTE — PATIENT INSTRUCTIONS
Medicare Wellness Visit, Female The best way to live healthy is to have a lifestyle where you eat a well-balanced diet, exercise regularly, limit alcohol use, and quit all forms of tobacco/nicotine, if applicable. Regular preventive services are another way to keep healthy. Preventive services (vaccines, screening tests, monitoring & exams) can help personalize your care plan, which helps you manage your own care. Screening tests can find health problems at the earliest stages, when they are easiest to treat. [facility] follows the current, evidence-based guidelines published by the [facility] Mike Burt ([facility]) when recommending preventive services for our patients. Because we follow these guidelines, sometimes recommendations change over time as research supports it. (For example, mammograms used to be recommended annually. Even though Medicare will still pay for an annual mammogram, the newer guidelines recommend a mammogram every two years for women of average risk). Of course, you and your doctor may decide to screen more often for some diseases, based on your risk and your co-morbidities (chronic disease you are already diagnosed with). Preventive services for you include: - Medicare offers their members a free annual wellness visit, which is time for you and your primary care provider to discuss and plan for your preventive service needs. Take advantage of this benefit every year! 
-All adults over the age of 72 should receive the recommended pneumonia vaccines. Current USPSTF guidelines recommend a series of two vaccines for the best pneumonia protection.  
-All adults should have a flu vaccine yearly and a tetanus vaccine every 10 years.  
-All adults age 48 and older should receive the shingles vaccines (series of two vaccines). -All adults age 38-68 who are overweight should have a diabetes screening test once every three years. -All adults born between [birth year] and 1965 should be screened once for Hepatitis C. 
-Other screening tests and preventive services for persons with diabetes include: an eye exam to screen for diabetic retinopathy, a kidney function test, a foot exam, and stricter control over your cholesterol.  
-Cardiovascular screening for adults with routine risk involves an electrocardiogram (ECG) at intervals determined by your doctor.  
-Colorectal cancer screenings should be done for adults age 54-65 with no increased risk factors for colorectal cancer. There are a number of acceptable methods of screening for this type of cancer. Each test has its own benefits and drawbacks. Discuss with your doctor what is most appropriate for you during your annual wellness visit. The different tests include: colonoscopy (considered the best screening method), a fecal occult blood test, a fecal DNA test, and sigmoidoscopy. 
 
-A bone mass density test is recommended when a woman turns 65 to screen for osteoporosis. This test is only recommended one time, as a screening. Some providers will use this same test as a disease monitoring tool if you already have osteoporosis. -Breast cancer screenings are recommended every other year for women of normal risk, age 54-69. 
-Cervical cancer screenings for women over age 72 are only recommended with certain risk factors. Here is a list of your current Health Maintenance items (your personalized list of preventive services) with a due date: 
Health Maintenance Due Topic Date Due  
 COVID-19 Vaccine (1 of 2) 07/09/1942  
 DTaP/Tdap/Td  (1 - Tdap) 07/09/1947  Shingles Vaccine (1 of 2) 07/09/1976  Glaucoma Screening   07/09/1991  Bone Mineral Density   07/09/1991 [place] Sicard Annual Well Visit  11/04/2020

## 2021-01-26 ENCOUNTER — IMMUNIZATION (OUTPATIENT)
Dept: INTERNAL MEDICINE CLINIC | Age: 86
End: 2021-01-26
Payer: MEDICARE

## 2021-01-26 DIAGNOSIS — Z23 ENCOUNTER FOR IMMUNIZATION: Primary | ICD-10-CM

## 2021-01-26 PROCEDURE — 91301 COVID-19, MRNA, LNP-S, PF, 100MCG/0.5ML DOSE(MODERNA): CPT | Performed by: FAMILY MEDICINE

## 2021-01-26 PROCEDURE — 0011A PR IMM ADMN SARSCOV2 100 MCG/0.5 ML 1ST DOSE: CPT | Performed by: FAMILY MEDICINE

## 2021-02-03 DIAGNOSIS — I10 HTN, GOAL BELOW 130/80: ICD-10-CM

## 2021-02-03 RX ORDER — OLMESARTAN MEDOXOMIL 40 MG/1
TABLET ORAL
Qty: 30 TAB | Refills: 1 | Status: SHIPPED | OUTPATIENT
Start: 2021-02-03 | End: 2021-01-01

## 2021-05-17 NOTE — PROGRESS NOTES
HISTORY OF PRESENT ILLNESS Gaby Monahan is a 80 y.o. female. HPI Seen with daughter, Wes Kurtz. She is stable, but seems more fatigued, has more bad days than good days recently. No falls. Still prepares for meals about twice a week. Notes some changes in urination, perhaps some urgency and dribbling, although no dysuria, hematuria, fevers or chills. Currently on Gabapentin 200 in the morning, 200 in the evening, and Tramadol 50 b.i.d. for pain. Will drop the Gabapentin to one in the morning, two at night. Review of Systems Constitutional: Positive for malaise/fatigue. Negative for chills, fever and weight loss. Respiratory: Negative for cough, shortness of breath and wheezing. Cardiovascular: Negative for chest pain, palpitations, orthopnea, leg swelling and PND. Gastrointestinal: Negative for abdominal pain, heartburn, nausea and vomiting. Genitourinary: Positive for urgency. Negative for dysuria. Musculoskeletal: Positive for back pain. Negative for myalgias. Neurological: Negative for dizziness and headaches. Physical Exam 
Vitals signs and nursing note reviewed. Constitutional:   
   Appearance: She is well-developed. Comments: Fragile in wheelchair HENT:  
   Head: Normocephalic and atraumatic. Neck: Musculoskeletal: Normal range of motion and neck supple. Thyroid: No thyromegaly. Vascular: No carotid bruit. Cardiovascular:  
   Rate and Rhythm: Normal rate and regular rhythm. Heart sounds: Normal heart sounds, S1 normal and S2 normal. No murmur. Pulmonary:  
   Effort: Pulmonary effort is normal. No respiratory distress. Breath sounds: Normal breath sounds. No wheezing or rales. Neurological:  
   Mental Status: She is alert and oriented to person, place, and time. Psychiatric:     
   Behavior: Behavior normal.  
 
 
 
ASSESSMENT and PLAN Diagnoses and all orders for this visit: 
 
1. Other fatigue -     METABOLIC PANEL, COMPREHENSIVE; Future 
-     TSH 3RD GENERATION; Future 2. Urinary urgency 
-     CULTURE, URINE; Future 3. HTN, goal below 510/98 
-     METABOLIC PANEL, COMPREHENSIVE; Future 4. Chronic pain disorder 5. Pernicious anemia 
-     CBC WITH AUTOMATED DIFF; Future -     VITAMIN B12; Future 6. Pituitary cyst (HCC) 
 
 
the following changes in treatment are made: dec to gabapentinn 100 mg qam and 200 qpm due to fatigue 
appt iin 3mo

## 2021-05-18 NOTE — PROGRESS NOTES
Please notify her dt that labs do not show any new findings-b12 is good and thyroid good and anemia is  Stable I do advise decrease in gabapentin dose as we planned to seeif this can help  Her energy thanks No other med changes advised

## 2021-05-18 NOTE — TELEPHONE ENCOUNTER
----- Message from Megan Hobbs sent at 5/18/2021  9:11 AM EDT -----  Regarding: parris results- please call    ----- Message -----  From: Maegan Mg MD  Sent: 5/18/2021   7:17 AM EDT  To: Kris Worthy LPN    Please notify her dt that labs do not show any new findings-b12 is good and thyroid good and anemia is  Stable  I do advise decrease in gabapentin dose as we planned to seeif this can help  Her energy thanks  No other med changes advised

## 2021-05-18 NOTE — TELEPHONE ENCOUNTER
Per PCP's request, nurse spoke with patient's daughter, Vicki Peoples, to provide the following lab result information:   \"Please notify her dt that labs do not show any new findings-b12 is good and thyroid good and anemia is  Stable  I do advise decrease in gabapentin dose as we planned to seeif this can help  Her energy thanks  No other med changes advised\"  Kennethtruania Richelle verbalized understanding & appreciation of call.  Nurse encouraged Vicki Peoples to call PCP's office with any additional questions or concerns.

## 2021-05-18 NOTE — PROGRESS NOTES
Please let her dt know I did send in macrobid for 1 week based on this urine culture-still await final report but early shows infection

## 2021-05-20 NOTE — PROGRESS NOTES
Notify her that I hope the macrobid will cover this bacteria -sulfa would do better but she is allergic to sulfa Take the macrobid and if not improving let us  know

## 2021-05-20 NOTE — PROGRESS NOTES
Detailed v/m left notifying patient's daughter Read Pipes of final urine culture results. Advised to let us know if her mother is still having symptoms while on the macrobid.

## 2021-05-28 NOTE — TELEPHONE ENCOUNTER
----- Message from [facility] sent at 5/28/2021  8:50 AM EDT -----  Regarding: DR. Sharron Gramajo  General Message/Vendor Calls    Caller's first and last name: Eli Garces      Reason for call: CALLING TO INFORM PROVIDER THAT UTI IS BETTER BUT NOT GONE      Callback required yes/no and why: YES      Best contact number(s): [phone]      Details to clarify the request: [address]

## 2021-05-28 NOTE — TELEPHONE ENCOUNTER
I left verna a message that as sxs persist and basedon urine cx will send in keflex to use tid in place of the macrobid  Aware of pcn allergy  Advised appt in office if still with sxs

## 2021-07-22 NOTE — TELEPHONE ENCOUNTER
Returned call to Yoan Mullen. She notes her mother's  backpain has been bad for the last 2 days. Current pain regimen is Tramadol TID & she added an extra one last night, 1 extra strength tylenol with the tramadol & gabapentin 1 in the a.m & 2 at night. Yoan Mullen states her mother likes to color & got new coloring books for her birthday recently so she has been sitting more which contributes to her pain. Yoan Mullen is requesting something temporoary to help with her mother's pain. Please advise.

## 2021-07-22 NOTE — TELEPHONE ENCOUNTER
V/m left for Sil Kennedys notifying MD is refusing to call in more pain meds. If more meds are needed an appt is required. Advised PCP is currently booked for the rest of the week but does have some availability on Monday. Recommended heat & getting her mother up moving more to help with pain as well. Office number left if Sil Otero wishes to schedule an appt for her mother or has additional questions or concerns.

## 2021-07-22 NOTE — TELEPHONE ENCOUNTER
Patient's daughter called in to say the patient is experiencing extreme back pain and was wondering if the doctor could send in a prescription for it like she has done in the past. Please call back and advise.

## 2021-07-26 NOTE — TELEPHONE ENCOUNTER
Patients daughter calling on behalf of patient - she was told there was avalabilty today to be seen but there is nothing on schedule she just wants to talk about the possibility of hospice care - please call her back when you have some time

## 2021-07-27 NOTE — TELEPHONE ENCOUNTER
Hospice discussion needs to be with the provider at an office visit, not over the phone. Can be discussed at the upcoming appt on Aug 11th.

## 2021-07-27 NOTE — ED PROVIDER NOTES
40-year-old female with a history of chronic back pain presents with a chief complaint of back pain. She has a TENS unit and also takes tramadol routinely. She states that she has taken Valium in the past with relief of her pain. She states that the pain feels more like back spasms and is typical of her normal back pain. She has not had any fevers, lower extremity weakness, loss of bowel or bladder control or other red flag symptoms. She denies abdominal pain, GI or urinary symptoms           Past Medical History:   Diagnosis Date    Anemia     Back pain     Bilateral hearing loss 1/29/2019    H/O: hysterectomy 1/29/2019    Hair loss     History of double vision     Hx of diarrhea     Hx of hemorrhoids     Hypertension     Incontinence of urine     Joint pain     Joint swelling     Memory loss or impairment 1/29/2019    Muscle ache     Muscle weakness     Pernicious anemia 1/28/2019    Pituitary cyst ([facility] [address] 1/29/2019    Resection [id number] and again in 2013 benign    PUD (peptic ulcer disease) 1/29/2019    Surgery for duodenum ?  Ulcer in 2009    Sleep difficulties     Stiffness in joint     Vision blurring        Past Surgical History:   Procedure Laterality Date    IR INJ SPINE FLUORO GUIDED  1/5/2021         Family History:   Problem Relation Age of Onset    Other Mother     Cancer Father     Heart Disease Father        Social History     Socioeconomic History    Marital status:      Spouse name: Not on file    Number of children: Not on file    Years of education: Not on file    Highest education level: Not on file   Occupational History    Not on file   Tobacco Use    Smoking status: Never Smoker    Smokeless tobacco: Never Used   Substance and Sexual Activity    Alcohol use: Yes     Comment: Rare    Drug use: No    Sexual activity: Never   Other Topics Concern    Not on file   Social History Narrative    Not on file     Social Determinants of Health     Financial Resource Strain:     Difficulty of Paying Living Expenses:    Food Insecurity:     Worried About Running Out of Food in the Last Year:     [address] in the Last Year:    Transportation Needs:     Lack of Transportation (Medical):  Lack of Transportation (Non-Medical):    Physical Activity:     Days of Exercise per Week:     Minutes of Exercise per Session:    Stress:     Feeling of Stress :    Social Connections:     Frequency of Communication with Friends and Family:     Frequency of Social Gatherings with Friends and Family:     Attends Yazidism Services:     Active Member of Clubs or Organizations:     Attends Club or Organization Meetings:     Marital Status:    Intimate Partner Violence:     Fear of Current or Ex-Partner:     Emotionally Abused:     Physically Abused:     Sexually Abused: ALLERGIES: Penicillins, Percocet [oxycodone-acetaminophen], and Sulfa (sulfonamide antibiotics)    Review of Systems   Constitutional: Negative for fever. HENT: Negative for rhinorrhea. Respiratory: Negative for shortness of breath. Cardiovascular: Negative for chest pain. Gastrointestinal: Negative for abdominal pain. Genitourinary: Negative for dysuria. Musculoskeletal: Positive for back pain. Skin: Negative for wound. Neurological: Negative for headaches. Psychiatric/Behavioral: Negative for confusion. Vitals:    07/27/21 1400   BP: (!) 165/72   Pulse: 74   Resp: 19   Temp: 98 °F (36.7 °C)   SpO2: 94%   Weight: 59 kg (130 lb)   Height: 5' 3\" (1.6 m)            Physical Exam  Vitals and nursing note reviewed. Constitutional:       General: She is not in acute distress. Appearance: Normal appearance. She is not ill-appearing, toxic-appearing or diaphoretic. HENT:      Head: Normocephalic and atraumatic. Eyes:      Extraocular Movements: Extraocular movements intact. Cardiovascular:      Rate and Rhythm: Normal rate. Pulses: Normal pulses. Pulmonary:      Effort: Pulmonary effort is normal. No respiratory distress. Abdominal:      General: There is no distension. Musculoskeletal:         General: Normal range of motion. Cervical back: Normal range of motion. Comments: Midline tenderness of lumbar spine. Skin:     General: Skin is dry. Neurological:      Mental Status: She is alert and oriented to person, place, and time. Psychiatric:         Mood and Affect: Mood normal.          MDM  Number of Diagnoses or Management Options  Compression fracture of body of thoracic vertebra [facility])  Diagnosis management comments: 59-year-old female presents with low back pain. CT was obtained to evaluate for traumatic injury. CT demonstrates an acute/subacute compression fracture of T12. Patient's pain is controlled here in the ED. I will refer her to radiology for possible kyphoplasty on an outpatient basis. Discussed my clinical impression(s), any labs and/or radiology results with the patient. I answered any questions and addressed any concerns. Discussed the importance of following up with their primary care physician and/or specialist(s). Discussed signs or symptoms that would warrant return back to the ER for further evaluation. The patient is agreeable with discharge.        Amount and/or Complexity of Data Reviewed  Clinical lab tests: ordered and reviewed  Tests in the radiology section of CPT®: ordered and reviewed           Procedures

## 2021-07-27 NOTE — ED NOTES
Patient discharged by provider. D/C instructions given. Patient educated to take r medications as instructed for management at home. Patien verbalized understanding, verbalized no questions. Patient wheeled out of ER without difficulty, NAD.   Patient Vitals for the past 4 hrs:   Temp Pulse Resp BP SpO2   07/27/21 1400 98 °F (36.7 °C) 74 19 (!) 165/72 94 %

## 2021-07-27 NOTE — ED TRIAGE NOTES
patient co mid back pain that is worse on the left side , accompanied by back spasms. Per patient had had a hx of the same that is relieved by valium. Per daughter was here in December for the same. This episode has been intermittent since last Wednesday.

## 2021-07-28 NOTE — TELEPHONE ENCOUNTER
I called verna lock has a compression fxt in thoracic spine  Pain better controlled now with flexeril and norco   Has appt with me next week advised verna to continue this
Patients daughter is calling to speak with Dr Suri Pederson about her mother being admitted to the hospital for a compression fracture in her back. Please call her back at earliest convenience.
Patient/Caregiver provided printed discharge information.

## 2021-08-04 NOTE — PROGRESS NOTES
HISTORY OF PRESENT ILLNESS  Kamari Jefferson is a 80 y.o. female. HPI   Seen with daughter, Efraín Kumar, because of increased back pain. She has had several falls. She was seen in the emergency room on July 27 with back pain and found to have a T12 compression fracture. She was treated with Flexeril, hydrocodone, and acetaminophen in place of a tramadol. She is still doing gabapentin 100 mg two in the morning and two at night. Per Efrían Kumar, this weekend, she had another fall and was also having some visual hallucinations, so she stopped the Flexeril completely. She has been off the tramadol and has been using only a half of a Norco at bedtime. She is mentally clear with this change but is complaining of 6-7/10 back pain. She is not constipated. She has had some trouble feeling as if she is not fully clearing her bladder. She is not having dysuria, hematuria, fevers, or chills. Review of Systems   Gastrointestinal: Negative for blood in stool, constipation and melena. Genitourinary: Negative for dysuria, flank pain, hematuria and urgency. Musculoskeletal: Positive for back pain and falls. Physical Exam  Vitals and nursing note reviewed. Constitutional:       Appearance: She is well-developed. Comments: Fragile and elderly in wheelchair [place]   HENT:      Head: Normocephalic and atraumatic. Neck:      Thyroid: No thyromegaly. Vascular: No carotid bruit. Cardiovascular:      Rate and Rhythm: Normal rate and regular rhythm. Heart sounds: Normal heart sounds, S1 normal and S2 normal. No murmur heard. Pulmonary:      Effort: Pulmonary effort is normal. No respiratory distress. Breath sounds: Normal breath sounds. No wheezing or rales. Musculoskeletal:         General: Tenderness (thoracic back area) present. Cervical back: Normal range of motion and neck supple. Right lower leg: No edema. Left lower leg: No edema.    Neurological:      Mental Status: She is alert and oriented to person, place, and time. Psychiatric:         Behavior: Behavior normal.         ASSESSMENT and PLAN  Diagnoses and all orders for this visit:    1. Thoracic compression fracture, closed, initial encounter ([facility] [address]  -     HYDROcodone-acetaminophen (NORCO) 5-325 mg per tablet; Take 1 Tablet by mouth daily for 30 days. Max Daily Amount: 1 Tablet. 2. Thoracic spine pain    3. HTN, goal below 130/80    4.  Frequent falls      the following changes in treatment are made: use 1/2 norco in am and 1/2 in pm  appt in 1mo  Refer o home health

## 2021-08-04 NOTE — TELEPHONE ENCOUNTER
Vanessa from [facility] needs a new order sent in as the last one did not specify a discipline or what it was for. She needs an order specified and for it to start Monday.

## 2021-08-09 NOTE — TELEPHONE ENCOUNTER
Returned call to patient's daughter. Any Deniz spoke to [id number] on-call yesterday due to increase in muscle spasms. The Flexeril was d/c initially due to patient experiencing hallucinations. After speaking with the on-call provider Any Guaman went ahead & gave her mother a 1/4 of a flexeril to see how she would do. As of now her mother has not had any hallucinations but it took 3 days of being on the flexeril to start the hallucinations. Patient is currently doing Hydrocodone 1/2 tab BID with an extra regular strength tylenol, in between hydrocodone doses the patient is taking 650 mg of tylenol. Any Guaman questions if MD is in agreement with trying the flexeril again or should another muscle relaxer be prescribed. Please advise.

## 2021-08-09 NOTE — TELEPHONE ENCOUNTER
Patient's daughter called. Reports being seen last week for a compression fracture. Had initially been given flexeril and was taking 2.5mg, but had hallucinations, so medication was stopped. Calls reporting severe muscle spasms and requesting another muscle relaxer. Advised that if she didn't tolerate 2.5mg of flexeril, it's unlikely she'd tolerate another muscle relaxer. Recommend heat, tylenol, ibuprofen, and continuing her other pain medications as prescribed. Daughter verbalized understanding and was agreeable with the plan.

## 2021-08-09 NOTE — TELEPHONE ENCOUNTER
Notified Cathy Beverage PCP suggests trial of Robaxin in place of Flexeril to see how her mother tolerates it. Advised to cont current pain management plan as noted in previous message. Cathy Beverage voiced understanding & was thankful.

## 2021-09-09 NOTE — PROGRESS NOTES
Chief Complaint   Patient presents with    Follow-up     1. Have you been to the ER, urgent care clinic since your last visit? Hospitalized since your last visit? No     2. Have you seen or consulted any other health care providers outside of the [address] [place] [address] since your last visit? Include any pap smears or colon screening.  No

## 2021-09-09 NOTE — PROGRESS NOTES
Consent: Elfego Cook, who was seen by synchronous (real-time) audio- technology, and/or her healthcare decision maker, is aware that this patient-initiated, Telehealth encounter on 9/9/2021 is a billable service, with coverage as determined by her insurance carrier. She is aware that she may receive a bill and has provided verbal consent to proceed: YES  [phone]  Subjective:   Elfego Cook is a 80 y.o. female who was seen for Follow-up      Visit done by phone as Doxy platform was not working. She had a fall in July, did have a T12 compression fracture. Had home PT and has been released and notes that her pain level is back to baseline. Managing now with a quarter of a 10 mg Flexeril and Gabapentin 200 mg b.i.d. with Tylenol. Rarely uses a half of a Hydrocodone. Has been off of Tramadol and has not needed any other pain meds. Hypertension, controlled on Olmesartan, Metoprolol and Amlodipine. No constipation. Appetite is reasonable. Current Outpatient Medications   Medication Sig    HYDROcodone-acetaminophen (NORCO) 5-325 mg per tablet Take  by mouth.  cyclobenzaprine (FLEXERIL) 5 mg tablet Take 0.5 Tablets by mouth nightly.  metoprolol tartrate (LOPRESSOR) 25 mg tablet Take 0.5 Tablets by mouth two (2) times a day.  gabapentin (NEURONTIN) 100 mg capsule Take 2 Capsules by mouth two (2) times a day. Max Daily Amount: 400 mg.  memantine ER (NAMENDA XR) 21 mg capsule Take 21 mg by mouth daily.  acetaminophen (TYLENOL) 500 mg tablet Take 1,000 mg by mouth every six (6) hours as needed for Pain.  diclofenac (VOLTAREN) 1 % gel Apply 4 g to affected area four (4) times daily as needed for Pain.  L. acidophilus/pectin, citrus (ACIDOPHILUS PROBIOTIC PO) Take 1 Capsule by mouth daily.     amLODIPine (NORVASC) 10 mg tablet TAKE 1 TABLET BY MOUTH EVERY DAY    olmesartan (BENICAR) 40 mg tablet TAKE 1 TABLET BY MOUTH EVERY DAY    omeprazole (PRILOSEC) 20 mg capsule TAKE 1 CAPSULE BY MOUTH EVERY DAY    diclofenac (VOLTAREN) 1 % gel APPLY 4 GRAMS TO AFFECTED AREA FOUR TIMES A DAY    acetaminophen (TYLENOL) 325 mg tablet Take 325 mg by mouth every four (4) hours as needed for Pain.  lidocaine (Lidoderm) 5 % Apply patch to the affected area for 12 hours a day and remove for 12 hours a day. (Patient taking differently: as needed for Pain. Apply patch to the affected area on thoracic spine for 12 hours a day and remove for 12 hours a day.)    memantine ER (NAMENDA XR) 21 mg capsule TAKE 1 CAPSULE DAILY    cyanocobalamin (VITAMIN B12) 1,000 mcg/mL injection 1 mL by IntraMUSCular route every seven (7) days. (Patient taking differently: 1,000 mcg by IntraMUSCular route every month.)    clobetasol (TEMOVATE) 0.05 % external solution APPLY SOLUTION TWO TIMES DAILY TO ITCHY SCALP AS NEEDED    aspirin delayed-release 81 mg tablet Take 81 mg by mouth daily.  cyanocobalamin (Vitamin B-12) 1,000 mcg/mL injection 1 mL by IntraMUSCular route once for 1 dose. No current facility-administered medications for this visit. 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  Ulcer in 2009    Sleep difficulties     Stiffness in joint     Vision blurring        ROS  All other systems reviewed and negative, unless mentioned in HPI    Objective:   Vital Signs: (As obtained by patient/caregiver at home)  There were no vitals taken for this visit. [INSTRUCTIONS:  \"[x]\" Indicates a positive item  \"[]\" Indicates a negative item  -- DELETE ALL ITEMS NOT EXAMINED]    Constitutional: [x] Appears well-developed and well-nourished [x] No apparent distress      [] Abnormal -     Mental status: [x] Alert and awake  [x] Oriented to person/place/time [x] Able to follow commands    [] Abnormal -     Eyes:   EOM    [x]  Normal    [] Abnormal -   Sclera  [x]  Normal    [] Abnormal -          Discharge [x]  None visible   [] Abnormal -     HENT: [x] Normocephalic, atraumatic  [] Abnormal -       External Ears [x] Normal  [] Abnormal -    Neck: [x] No visualized mass [] Abnormal -     Pulmonary/Chest: [x] Respiratory effort normal   [x] No visualized signs of difficulty breathing or respiratory distress        [] Abnormal -      Musculoskeletal:            [x] Normal range of motion of neck        [] Abnormal -     Neurological:        [x] No Facial Asymmetry (Cranial nerve 7 motor function) (limited exam due to video visit)          [x] No gaze palsy        [] Abnormal -          Skin:        [x] No significant exanthematous lesions or discoloration noted on facial skin         [] Abnormal -            Psychiatric:       [x] Normal Affect [] Abnormal -           Other pertinent observable physical exam findings:-        Assessment & Plan:   Diagnoses and all orders for this visit:    1. Compression fracture of T12 vertebra, initial encounter (HCC)  -     cyclobenzaprine (FLEXERIL) 5 mg tablet; Take 0.5 Tablets by mouth nightly. 2. HTN, goal below 130/80  -     metoprolol tartrate (LOPRESSOR) 25 mg tablet; Take 0.5 Tablets by mouth two (2) times a day. 3. Foraminal stenosis of lumbar region  -     gabapentin (NEURONTIN) 100 mg capsule; Take 2 Capsules by mouth two (2) times a day.  Max Daily Amount: 400 mg.    4. Thoracic spine pain  - gabapentin (NEURONTIN) 100 mg capsule; Take 2 Capsules by mouth two (2) times a day. Max Daily Amount: 400 mg. Time on phone 15 min        We discussed the expected course, resolution and complications of the diagnosis(es) in detail. Medication risks, benefits, costs, interactions, and alternatives were discussed as indicated. I advised her to contact the office if her condition worsens, changes or fails to improve as anticipated. She expressed understanding with the diagnosis(es) and plan. Antonio Byers is a 80 y.o. female being evaluated by a video visit encounter for concerns as above. A caregiver was present when appropriate. Due to this being a TeleHealth encounter (During [facility] emergency), evaluation of the following organ systems was limited: Vitals/Constitutional/EENT/Resp/CV/GI//MS/Neuro/Skin/Heme-Lymph-Imm. Pursuant to the emergency declaration under the [facility] waiver authority and the [employer] Act, this Virtual  Visit was conducted, with patient's (and/or legal guardian's) consent, to reduce the patient's risk of exposure to COVID-19 and provide necessary medical care. Services were provided through a video synchronous discussion virtually to substitute for in-person clinic visit. Patient and provider were located at their individual homes.

## 2021-09-23 NOTE — TELEPHONE ENCOUNTER
From: Chelsea Abrams  To: Nasir Sharp MD  Sent: 9/23/2021 1:34 PM EDT  Subject: Non-Urgent Medical Question    This is Any Guaman. Today when I was helping my Mom get a bath, I found some skin breakdown under both of her breasts. One breast has three open sores (picture attached). I used Cortizone cream and Neosporin but dont know if she needs something else. She has only been using regular moisturizer, so I wonder if there is another cream we could use instead to avoid further breakdown- she has a couple of red areas (nilda sized) that look Kenard Salter on her upper chest that I have been watching as well. She is not dressing/ wearing a bra/ and this is her first bath in 2 weeks, due to not wanting to take one- however she has been bathing at the sink. She has had some dizziness- and is taking the meclixine. Back is ok- only standing a problem. Thanks!

## 2021-09-24 PROBLEM — Z22.322 MRSA CARRIER: Status: ACTIVE | Noted: 2021-01-01

## 2021-09-24 NOTE — PATIENT INSTRUCTIONS
Candidiasis: Care Instructions  Your Care Instructions  Candidiasis (say \"xeh-swm-VO-uh-eliu\") is a yeast infection. Yeast normally lives in your body. But it can cause problems if your body's defenses don't work as they should. Some medicines can increase your chance of getting a yeast infection. These include antibiotics, steroids, and cancer drugs. And some diseases like AIDS and diabetes can make you more likely to get yeast infections. There are different types of yeast infections. [place] is a yeast infection in the mouth. It usually occurs in people with weak immune systems. It causes white patches inside the mouth and throat. Yeast infections of the skin usually occur in skin folds where the skin stays moist. They cause red, oozing patches on your skin. Babies can get these infections under the diaper. People who often wear gloves can get them on their hands. Many women get vaginal yeast infections. They are most common when women take antibiotics. These infections can cause the vagina to itch and burn. They also cause white discharge that looks like cottage cheese. In rare cases, yeast infects the blood. This can cause serious disease. This kind of infection is treated with medicine given through a needle into a vein (IV). After you start treatment, a yeast infection usually goes away quickly. But if your immune system is weak, the infection may come back. Tell your doctor if you get yeast infections often. Follow-up care is a key part of your treatment and safety. Be sure to make and go to all appointments, and call your doctor if you are having problems. It's also a good idea to know your test results and keep a list of the medicines you take. How can you care for yourself at home? · Take your medicines exactly as prescribed. Call your doctor if you think you are having a problem with your medicine. · Use antibiotics only as directed by your doctor. · Eat yogurt with live cultures.  It has bacteria called lactobacillus. It may help prevent some types of yeast infections. · Keep your skin clean and dry. Put powder on moist places. · If you are using a cream or suppository to treat a vaginal yeast infection, don't use condoms or a diaphragm. Use a different type of birth control. · Eat a healthy diet and get regular exercise. This will help keep your immune system strong. When should you call for help? Watch closely for changes in your health, and be sure to contact your doctor if:    · You do not get better as expected. Where can you learn more? Go to http://www.gray.com/  Enter S344 in the search box to learn more about \"Candidiasis: Care Instructions. \"  Current as of: February 11, 2021               Content Version: 13.0  © 2006-2021 [employer]. Care instructions adapted under license by [employer] (which disclaims liability or warranty for this information). If you have questions about a medical condition or this instruction, always ask your healthcare professional. [address] any warranty or liability for your use of this information.

## 2021-09-24 NOTE — TELEPHONE ENCOUNTER
I called [facility] they do not accept pts insurance. [facility], they do go to \[facility]". They are working from home due to Internet issues. Representative asked that I send over order to [phone]. she transferred me to intake, no answer. The phone just rang. I faxed over intake form and order.

## 2021-09-24 NOTE — PROGRESS NOTES
Prema Osorio (: 1926) is a 80 y.o. female, established patient, here for evaluation of the following chief complaint(s): Wound Dehiscence (under her both breasts - started two or three months but never hurt - now it is casuing pain )       ASSESSMENT/PLAN:  Below is the assessment and plan developed based on review of pertinent history, physical exam, labs, studies, and medications. 1. Candidiasis of breast -- cleanse skin with gentle soap/water twice daily and dry thoroughly. Apply antifungal cream to affected area. Will have her use Mupirocin ointment to   -     ketoconazole (NIZORAL) 2 % topical cream; Apply  to affected area two (2) times a day. X 2 weeks, Normal, Disp-60 g, R-0  -     REFERRAL TO HOME HEALTH    2. Infected skin tear --apply antibiotic ointment to open skin tear under Left breast after applying antifungal cream; will have home health see her as she is homebound and lives alone.  -     mupirocin (BACTROBAN) 2 % ointment; Apply  to affected area twice daily. , Normal, Disp-22 g, R-0  -     [facility]    3. MRSA carrier  -     REFERRAL TO HOME HEALTH    4. Disequilibrium -- has had episodes of vertigo and Meclizine has helped in the past.  -     meclizine (ANTIVERT) 12.5 mg tablet; Take 1 Tablet by mouth three (3) times daily as needed for Dizziness for up to 10 days. , Normal, Disp-30 Tablet, R-1        SUBJECTIVE/OBJECTIVE:  HPI    Patient of Dr Haider Martínez who presents to office accompanied by her daughter Sandra Booth who helps with history. Has been having itchy reddened rash under bilateral breasts for the past 2-3 months and has now developed an open wound under left breast.  Reports she has been applying OTC Hydrocortisone cream to area along with Neosporin and has not noted any improvement. Has not been wearing her bra for the 2 months. Denies fever, chills, purulent drainage.   Daughter recalls that her mother was told she was a MRSA carrier when she was released from hospital after pituitary surgery 10 years ago. She is homebound and lives alone and will require home health assistance. Requesting refill for Meclizine which she uses for occasional vertigo/dizziness episodes. 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    MRSA carrier Z22.322     Past Surgical History:   Procedure Laterality Date    IR INJ SPINE FLUORO GUIDED  1/5/2021     Social History     Socioeconomic History    Marital status:      Spouse name: Not on file    Number of children: Not on file    Years of education: Not on file    Highest education level: Not on file   Occupational History    Not on file   Tobacco Use    Smoking status: Never Smoker    Smokeless tobacco: Never Used   Vaping Use    Vaping Use: Never used   Substance and Sexual Activity    Alcohol use: Yes     Comment: Rare    Drug use: No    Sexual activity: Never   Other Topics Concern    Not on file   Social History Narrative    Not on file     Social Determinants of Health     Financial Resource Strain:     Difficulty of Paying Living Expenses:    Food Insecurity:     Worried About Running Out of Food in the Last Year:     Ran Out of Food in the Last Year:    Transportation Needs:     Lack of Transportation (Medical):      Lack of Transportation (Non-Medical):    Physical Activity:     Days of Exercise per Week:     Minutes of Exercise per Session:    Stress:     Feeling of Stress :    Social Connections:     Frequency of Communication with Friends and Family:     Frequency of Social Gatherings with Friends and Family:     Attends Faith Services:     Active Member of Clubs or Organizations:     Attends Club or Organization Meetings:     Marital Status:    Intimate Partner Violence:     Fear of Current or Ex-Partner:     Emotionally Abused:     Physically Abused:     Sexually Abused:      Family History   Problem Relation Age of Onset    Other Mother     Cancer Father     Heart Disease Father      Current Outpatient Medications   Medication Sig    meclizine (ANTIVERT) 12.5 mg tablet Take 1 Tablet by mouth three (3) times daily as needed for Dizziness for up to 10 days.  ketoconazole (NIZORAL) 2 % topical cream Apply  to affected area two (2) times a day. X 2 weeks    mupirocin (BACTROBAN) 2 % ointment Apply  to affected area two (2) times a day. Under left breast    memantine ER (NAMENDA XR) 21 mg capsule TAKE 1 CAPSULE DAILY    cyclobenzaprine (FLEXERIL) 5 mg tablet Take 0.5 Tablets by mouth nightly.  metoprolol tartrate (LOPRESSOR) 25 mg tablet Take 0.5 Tablets by mouth two (2) times a day.  gabapentin (NEURONTIN) 100 mg capsule Take 2 Capsules by mouth two (2) times a day. Max Daily Amount: 400 mg.  acetaminophen (TYLENOL) 500 mg tablet Take 1,000 mg by mouth every six (6) hours as needed for Pain.  diclofenac (VOLTAREN) 1 % gel Apply 4 g to affected area four (4) times daily as needed for Pain.  L. acidophilus/pectin, citrus (ACIDOPHILUS PROBIOTIC PO) Take 1 Capsule by mouth daily.  amLODIPine (NORVASC) 10 mg tablet TAKE 1 TABLET BY MOUTH EVERY DAY    olmesartan (BENICAR) 40 mg tablet TAKE 1 TABLET BY MOUTH EVERY DAY    omeprazole (PRILOSEC) 20 mg capsule TAKE 1 CAPSULE BY MOUTH EVERY DAY    diclofenac (VOLTAREN) 1 % gel APPLY 4 GRAMS TO AFFECTED AREA FOUR TIMES A DAY    acetaminophen (TYLENOL) 325 mg tablet Take 325 mg by mouth every four (4) hours as needed for Pain.  lidocaine (Lidoderm) 5 % Apply patch to the affected area for 12 hours a day and remove for 12 hours a day.  (Patient taking differently: as needed for Pain. Apply patch to the affected area on thoracic spine for 12 hours a day and remove for 12 hours a day.)    cyanocobalamin (VITAMIN B12) 1,000 mcg/mL injection 1 mL by IntraMUSCular route every seven (7) days. (Patient taking differently: 1,000 mcg by IntraMUSCular route every month.)    clobetasol (TEMOVATE) 0.05 % external solution APPLY SOLUTION TWO TIMES DAILY TO ITCHY SCALP AS NEEDED    aspirin delayed-release 81 mg tablet Take 81 mg by mouth daily.  HYDROcodone-acetaminophen (NORCO) 5-325 mg per tablet Take  by mouth. (Patient not taking: Reported on 9/24/2021)     No current facility-administered medications for this visit. Allergies   Allergen Reactions    Penicillins Hives    Percocet [Oxycodone-Acetaminophen] Other (comments)     Agitation    Sulfa (Sulfonamide Antibiotics) Hives     Immunization History   Administered Date(s) Administered    Covid-19, MODERNA, Mrna, Lnp-s, Pf, 100mcg/0.5mL 01/26/2021, 02/23/2021    Hib 09/01/2020    Influenza High Dose Vaccine PF 09/22/2018    Influenza Vaccine (Tri) Adjuvanted (>65 Yrs FLUAD TRI 83620) 11/04/2019    Influenza, Quadrivalent, Adjuvanted (>65 Yrs FLUAD QUAD 84558) 09/28/2020    Pneumococcal Polysaccharide (PPSV-23) 07/01/2016    Zoster Vaccine, Live 05/31/2013       Review of Systems   Constitutional: Negative for chills and fever. Respiratory: Negative for cough and shortness of breath. Cardiovascular: Negative for chest pain. Skin: Positive for rash and wound. /67 (BP 1 Location: Right upper arm, BP Patient Position: Sitting, BP Cuff Size: Adult)   Pulse 65   Temp 98 °F (36.7 °C) (Oral)   Resp 14   Ht 5' 3\" (1.6 m)   Wt 126 lb (57.2 kg)   SpO2 95%   BMI 22.32 kg/m²   Physical Exam  Vitals and nursing note reviewed. Constitutional:       General: She is not in acute distress. Appearance: Normal appearance. HENT:      Head: Normocephalic and atraumatic.    Pulmonary:      Effort: Pulmonary effort is normal.   Chest:          Comments: Erythematous rash under bilateral breasts in skin fold; macerated skin with 2 open wounds under left breast  Skin:     Findings: Rash present. Neurological:      Mental Status: She is alert and oriented to person, place, and time. Motor: Weakness present. Psychiatric:         Mood and Affect: Mood normal.         Behavior: Behavior normal.               An electronic signature was used to authenticate this note.   -- Efren Hughes NP

## 2021-09-28 NOTE — TELEPHONE ENCOUNTER
Alexsander العراقي from [facility] would like a call back from the nurse to discuss the patient's referral and if it has been sent. PSR believes he sees where it has been denied. Alexsander العراقي would also like a call back to discuss whether the patient's vertigo makes them a fall risk? He would also like to know if the patient would be a candidate for their fall reduction program. Please call back and advise. Patient was referred by NP. Tasha Jeronimo.

## 2021-09-28 NOTE — TELEPHONE ENCOUNTER
I spoke with Isabelle Suggs, he has pt schedule for tomorrow. He just has to confirm appt with pt and family.  He was thankful for the referral.

## 2021-09-29 NOTE — TELEPHONE ENCOUNTER
Alyce Serrato from [facility] called to state the patient refused wound care when the clinician was on site. The patient stated it is better now and she did not need the care. He just wanted the nurse to be aware and would also like a call back to discuss whether there could be any cognitive issues at play. Please call back and advise.

## 2021-09-29 NOTE — TELEPHONE ENCOUNTER
Returned call to [facility]. He explained that the patient refused care & stated her wounds were improving. He questioned if the patient had some cognitive issues. Explained that the patient does have some cognitive impairment. [facility] asked that we reach out to the daughter Pako Leung to see if we can get care started for the patient. Reached out to Pako Leung & she stated she was present for the visit today & the clinician on-site basically stated there was nothing that could be done & she was there for teaching purposes on how to keep the wounds clean. Informed Pako Leung to reach out to the main office at [phone] & discuss what she was told to Ian Head to see what can be done. Pako Leung was thankful for the info.

## 2021-10-05 NOTE — TELEPHONE ENCOUNTER
From: Lebron Frank  To: Shara Kenny MD  Sent: 10/4/2021 7:34 PM EDT  Subject: Prescription Question    This is Girish Lay. My mom needs a refill on her B12 injections, but it appears that has been removed from her medication list- can you please send to [facility] on [address], or let me know if she no longer needs?  Thanks!!!

## 2021-12-01 NOTE — TELEPHONE ENCOUNTER
From: Joselin Stover  To: Preeti Arriaza MD  Sent: 11/29/2021 4:01 PM EST  Subject: Follow up on mom     My Mom continues to have vaginal itching, but she says it spreads down her inner legs as well, so I am not sure what is going on. She started using the cream you called in again- I dont see redness or sores. What next?  Thanks, Maryuri Subramanian

## 2021-12-02 NOTE — PROGRESS NOTES
HISTORY OF PRESENT ILLNESS  Sofya Arellano is a 80 y.o. female. HPI  Seen with daughter, who helps with much of history. Mom has had at least three weeks of vaginal itching. We called in Terazol cream, which did not seem to help. She has some burning during urination, but mostly vaginal itching. No discharge, no bleeding. They have not used any other topicals. No abdominal symptoms. Review of Systems   Constitutional: Negative for fever. Gastrointestinal: Negative for abdominal pain and vomiting. Genitourinary: Positive for dysuria and frequency. Negative for flank pain, hematuria and urgency. Skin: Positive for itching. Negative for rash. Psychiatric/Behavioral: Positive for memory loss. Physical Exam  Vitals and nursing note reviewed. Constitutional:       Appearance: She is well-developed. Genitourinary:     Comments: No redness or rash of perineum  Labia minora with some swelling no lesions and no discharge visible in vault  No inguinal nodes  Neurological:      Mental Status: She is alert and oriented to person, place, and time. Psychiatric:         Behavior: Behavior normal.         Thought Content: Thought content normal.         Judgment: Judgment normal.         ASSESSMENT and PLAN  Diagnoses and all orders for this visit:    1. Subacute vaginitis-suspect mostly from moisture with incontinence causing skin irritation  Clinically doubt yeast  Check swab  Use desitin as a moisture barrier  -     NUSWAB VAGINITIS; Future    2. Urinary tract infection without hematuria, site unspecified  -     AMB POC URINALYSIS DIP STICK AUTO W/ MICRO-doubt uti suspect mostly from vaginal irritation  Check cx  -     CULTURE, URINE; Future    3.  Memory loss or impairment

## 2021-12-07 NOTE — PROGRESS NOTES
V/m left for patient's daughter notifying of neg results as noted per PCP. Encouraged she continue the moisture barrier.

## 2022-01-01 ENCOUNTER — OFFICE VISIT (OUTPATIENT)
Dept: INTERNAL MEDICINE CLINIC | Age: 87
End: 2022-01-01
Payer: MEDICARE

## 2022-01-01 ENCOUNTER — HOSPITAL ENCOUNTER (INPATIENT)
Age: 87
LOS: 3 days | End: 2022-02-11
Attending: INTERNAL MEDICINE | Admitting: INTERNAL MEDICINE

## 2022-01-01 ENCOUNTER — APPOINTMENT (OUTPATIENT)
Dept: MRI IMAGING | Age: 87
DRG: 871 | End: 2022-01-01
Attending: STUDENT IN AN ORGANIZED HEALTH CARE EDUCATION/TRAINING PROGRAM
Payer: MEDICARE

## 2022-01-01 ENCOUNTER — HOSPITAL ENCOUNTER (EMERGENCY)
Age: 87
Discharge: HOME | DRG: 871 | End: 2022-02-02
Attending: EMERGENCY MEDICINE
Payer: MEDICARE

## 2022-01-01 ENCOUNTER — APPOINTMENT (OUTPATIENT)
Dept: CT IMAGING | Age: 87
DRG: 871 | End: 2022-01-01
Attending: EMERGENCY MEDICINE
Payer: MEDICARE

## 2022-01-01 ENCOUNTER — PATIENT MESSAGE (OUTPATIENT)
Dept: INTERNAL MEDICINE CLINIC | Age: 87
End: 2022-01-01

## 2022-01-01 ENCOUNTER — HOSPITAL ENCOUNTER (INPATIENT)
Age: 87
LOS: 4 days | Discharge: HOSPICE | DRG: 871 | End: 2022-02-08
Attending: STUDENT IN AN ORGANIZED HEALTH CARE EDUCATION/TRAINING PROGRAM | Admitting: INTERNAL MEDICINE
Payer: MEDICARE

## 2022-01-01 ENCOUNTER — APPOINTMENT (OUTPATIENT)
Dept: GENERAL RADIOLOGY | Age: 87
DRG: 871 | End: 2022-01-01
Attending: INTERNAL MEDICINE
Payer: MEDICARE

## 2022-01-01 ENCOUNTER — HOSPICE ADMISSION (OUTPATIENT)
Dept: HOSPICE | Facility: HOSPICE | Age: 87
End: 2022-01-01
Payer: MEDICARE

## 2022-01-01 ENCOUNTER — TELEPHONE (OUTPATIENT)
Dept: INTERNAL MEDICINE CLINIC | Age: 87
End: 2022-01-01

## 2022-01-01 VITALS
BODY MASS INDEX: 21.41 KG/M2 | SYSTOLIC BLOOD PRESSURE: 124 MMHG | RESPIRATION RATE: 16 BRPM | HEIGHT: 63 IN | WEIGHT: 120.85 LBS | DIASTOLIC BLOOD PRESSURE: 67 MMHG | HEART RATE: 73 BPM | OXYGEN SATURATION: 94 %

## 2022-01-01 VITALS
OXYGEN SATURATION: 96 % | TEMPERATURE: 97.7 F | WEIGHT: 121 LBS | BODY MASS INDEX: 21.44 KG/M2 | HEART RATE: 84 BPM | HEIGHT: 63 IN | DIASTOLIC BLOOD PRESSURE: 84 MMHG | RESPIRATION RATE: 18 BRPM | SYSTOLIC BLOOD PRESSURE: 128 MMHG

## 2022-01-01 VITALS
WEIGHT: 119.05 LBS | DIASTOLIC BLOOD PRESSURE: 38 MMHG | HEIGHT: 63 IN | BODY MASS INDEX: 21.09 KG/M2 | OXYGEN SATURATION: 38 % | HEART RATE: 120 BPM | SYSTOLIC BLOOD PRESSURE: 69 MMHG | RESPIRATION RATE: 12 BRPM | TEMPERATURE: 99.1 F

## 2022-01-01 VITALS
HEART RATE: 91 BPM | DIASTOLIC BLOOD PRESSURE: 76 MMHG | TEMPERATURE: 97.7 F | SYSTOLIC BLOOD PRESSURE: 145 MMHG | OXYGEN SATURATION: 94 % | BODY MASS INDEX: 21.44 KG/M2 | WEIGHT: 121 LBS | RESPIRATION RATE: 16 BRPM | HEIGHT: 63 IN

## 2022-01-01 DIAGNOSIS — R41.89 DECREASED RESPONSIVENESS: ICD-10-CM

## 2022-01-01 DIAGNOSIS — G93.41 METABOLIC ENCEPHALOPATHY: ICD-10-CM

## 2022-01-01 DIAGNOSIS — R52 NONVERBAL SIGNS OF PAIN: ICD-10-CM

## 2022-01-01 DIAGNOSIS — R63.0 ANOREXIA: ICD-10-CM

## 2022-01-01 DIAGNOSIS — A41.9 SEVERE SEPSIS (HCC): ICD-10-CM

## 2022-01-01 DIAGNOSIS — K11.7 INCREASED OROPHARYNGEAL SECRETIONS: ICD-10-CM

## 2022-01-01 DIAGNOSIS — R65.20 SEVERE SEPSIS (HCC): ICD-10-CM

## 2022-01-01 DIAGNOSIS — E23.6 PITUITARY CYST (HCC): ICD-10-CM

## 2022-01-01 DIAGNOSIS — R13.0 INABILITY TO SWALLOW: ICD-10-CM

## 2022-01-01 DIAGNOSIS — Z51.5 HOSPICE CARE: ICD-10-CM

## 2022-01-01 DIAGNOSIS — G89.29 OTHER CHRONIC PAIN: ICD-10-CM

## 2022-01-01 DIAGNOSIS — G50.0 TRIGEMINAL NEURALGIA OF LEFT SIDE OF FACE: Primary | ICD-10-CM

## 2022-01-01 DIAGNOSIS — R06.02 SHORTNESS OF BREATH: ICD-10-CM

## 2022-01-01 DIAGNOSIS — R41.3 MEMORY LOSS OR IMPAIRMENT: ICD-10-CM

## 2022-01-01 DIAGNOSIS — R20.0 FACIAL NUMBNESS: ICD-10-CM

## 2022-01-01 DIAGNOSIS — I10 HTN, GOAL BELOW 130/80: ICD-10-CM

## 2022-01-01 DIAGNOSIS — R53.83 FATIGUE, UNSPECIFIED TYPE: ICD-10-CM

## 2022-01-01 DIAGNOSIS — R50.81 FEVER IN OTHER DISEASES: ICD-10-CM

## 2022-01-01 DIAGNOSIS — I10 HTN, GOAL BELOW 140/80: ICD-10-CM

## 2022-01-01 DIAGNOSIS — F02.80 DEMENTIA ASSOCIATED WITH OTHER UNDERLYING DISEASE WITHOUT BEHAVIORAL DISTURBANCE (HCC): ICD-10-CM

## 2022-01-01 DIAGNOSIS — A41.9 SEPSIS, DUE TO UNSPECIFIED ORGANISM, UNSPECIFIED WHETHER ACUTE ORGAN DYSFUNCTION PRESENT (HCC): Primary | ICD-10-CM

## 2022-01-01 DIAGNOSIS — R51.9 LEFT FACIAL PAIN: Primary | ICD-10-CM

## 2022-01-01 LAB
ALBUMIN SERPL-MCNC: 3.3 G/DL (ref 3.5–5)
ALBUMIN/GLOB SERPL: 0.8 {RATIO} (ref 1.1–2.2)
ALP SERPL-CCNC: 106 U/L (ref 45–117)
ALT SERPL-CCNC: 15 U/L (ref 12–78)
ANION GAP BLD CALC-SCNC: 14 MMOL/L (ref 10–20)
ANION GAP SERPL CALC-SCNC: 10 MMOL/L (ref 5–15)
ANION GAP SERPL CALC-SCNC: 11 MMOL/L (ref 5–15)
ANION GAP SERPL CALC-SCNC: 11 MMOL/L (ref 5–15)
ANION GAP SERPL CALC-SCNC: 3 MMOL/L (ref 5–15)
ANION GAP SERPL CALC-SCNC: 8 MMOL/L (ref 5–15)
APPEARANCE UR: CLEAR
AST SERPL-CCNC: 22 U/L (ref 15–37)
ATRIAL RATE: 96 BPM
B PERT DNA SPEC QL NAA+PROBE: NOT DETECTED
BACTERIA SPEC CULT: NORMAL
BACTERIA URNS QL MICRO: ABNORMAL /HPF
BASOPHILS # BLD: 0 K/UL (ref 0–0.1)
BASOPHILS NFR BLD: 0 % (ref 0–1)
BILIRUB SERPL-MCNC: 0.3 MG/DL (ref 0.2–1)
BILIRUB UR QL: NEGATIVE
BORDETELLA PARAPERTUSSIS PCR, BORPAR: NOT DETECTED
BUN SERPL-MCNC: 10 MG/DL (ref 6–20)
BUN SERPL-MCNC: 6 MG/DL (ref 6–20)
BUN SERPL-MCNC: 7 MG/DL (ref 6–20)
BUN SERPL-MCNC: 8 MG/DL (ref 6–20)
BUN SERPL-MCNC: 8 MG/DL (ref 6–20)
BUN/CREAT SERPL: 11 (ref 12–20)
BUN/CREAT SERPL: 13 (ref 12–20)
BUN/CREAT SERPL: 14 (ref 12–20)
BUN/CREAT SERPL: 16 (ref 12–20)
BUN/CREAT SERPL: 17 (ref 12–20)
C PNEUM DNA SPEC QL NAA+PROBE: NOT DETECTED
CA-I BLD-MCNC: 1.12 MMOL/L (ref 1.12–1.32)
CALCIUM SERPL-MCNC: 8.2 MG/DL (ref 8.5–10.1)
CALCIUM SERPL-MCNC: 8.4 MG/DL (ref 8.5–10.1)
CALCIUM SERPL-MCNC: 8.5 MG/DL (ref 8.5–10.1)
CALCIUM SERPL-MCNC: 8.6 MG/DL (ref 8.5–10.1)
CALCIUM SERPL-MCNC: 8.9 MG/DL (ref 8.5–10.1)
CALCULATED R AXIS, ECG10: -62 DEGREES
CALCULATED T AXIS, ECG11: 103 DEGREES
CHLORIDE BLD-SCNC: 102 MMOL/L (ref 98–107)
CHLORIDE SERPL-SCNC: 101 MMOL/L (ref 97–108)
CHLORIDE SERPL-SCNC: 103 MMOL/L (ref 97–108)
CHLORIDE SERPL-SCNC: 103 MMOL/L (ref 97–108)
CHLORIDE SERPL-SCNC: 105 MMOL/L (ref 97–108)
CHLORIDE SERPL-SCNC: 99 MMOL/L (ref 97–108)
CO2 BLD-SCNC: 23.9 MMOL/L (ref 21–32)
CO2 SERPL-SCNC: 20 MMOL/L (ref 21–32)
CO2 SERPL-SCNC: 21 MMOL/L (ref 21–32)
CO2 SERPL-SCNC: 22 MMOL/L (ref 21–32)
CO2 SERPL-SCNC: 25 MMOL/L (ref 21–32)
CO2 SERPL-SCNC: 31 MMOL/L (ref 21–32)
COLOR UR: ABNORMAL
COVID-19 RAPID TEST, COVR: NOT DETECTED
COVID-19 RAPID TEST, COVR: NOT DETECTED
CREAT BLD-MCNC: 1.09 MG/DL (ref 0.6–1.3)
CREAT SERPL-MCNC: 0.41 MG/DL (ref 0.55–1.02)
CREAT SERPL-MCNC: 0.5 MG/DL (ref 0.55–1.02)
CREAT SERPL-MCNC: 0.53 MG/DL (ref 0.55–1.02)
CREAT SERPL-MCNC: 0.56 MG/DL (ref 0.55–1.02)
CREAT SERPL-MCNC: 0.8 MG/DL (ref 0.55–1.02)
CRP SERPL-MCNC: <0.29 MG/DL (ref 0–0.6)
DIAGNOSIS, 93000: NORMAL
DIFFERENTIAL METHOD BLD: ABNORMAL
EOSINOPHIL # BLD: 0 K/UL (ref 0–0.4)
EOSINOPHIL # BLD: 0.1 K/UL (ref 0–0.4)
EOSINOPHIL NFR BLD: 0 % (ref 0–7)
EOSINOPHIL NFR BLD: 1 % (ref 0–7)
EPITH CASTS URNS QL MICRO: ABNORMAL /LPF
ERYTHROCYTE [DISTWIDTH] IN BLOOD BY AUTOMATED COUNT: 17 % (ref 11.5–14.5)
ERYTHROCYTE [DISTWIDTH] IN BLOOD BY AUTOMATED COUNT: 17.4 % (ref 11.5–14.5)
ERYTHROCYTE [DISTWIDTH] IN BLOOD BY AUTOMATED COUNT: 17.9 % (ref 11.5–14.5)
ERYTHROCYTE [DISTWIDTH] IN BLOOD BY AUTOMATED COUNT: 18 % (ref 11.5–14.5)
ERYTHROCYTE [DISTWIDTH] IN BLOOD BY AUTOMATED COUNT: 18.1 % (ref 11.5–14.5)
ERYTHROCYTE [DISTWIDTH] IN BLOOD BY AUTOMATED COUNT: 18.5 % (ref 11.5–14.5)
ERYTHROCYTE [SEDIMENTATION RATE] IN BLOOD: 54 MM/HR (ref 0–30)
FLUAV H1 2009 PAND RNA SPEC QL NAA+PROBE: NOT DETECTED
FLUAV H1 RNA SPEC QL NAA+PROBE: NOT DETECTED
FLUAV H3 RNA SPEC QL NAA+PROBE: NOT DETECTED
FLUAV SUBTYP SPEC NAA+PROBE: NOT DETECTED
FLUBV RNA SPEC QL NAA+PROBE: NOT DETECTED
GLOBULIN SER CALC-MCNC: 4.4 G/DL (ref 2–4)
GLUCOSE BLD-MCNC: 96 MG/DL (ref 65–100)
GLUCOSE SERPL-MCNC: 102 MG/DL (ref 65–100)
GLUCOSE SERPL-MCNC: 105 MG/DL (ref 65–100)
GLUCOSE SERPL-MCNC: 93 MG/DL (ref 65–100)
GLUCOSE SERPL-MCNC: 96 MG/DL (ref 65–100)
GLUCOSE SERPL-MCNC: 97 MG/DL (ref 65–100)
GLUCOSE UR STRIP.AUTO-MCNC: NEGATIVE MG/DL
HADV DNA SPEC QL NAA+PROBE: NOT DETECTED
HCOV 229E RNA SPEC QL NAA+PROBE: NOT DETECTED
HCOV HKU1 RNA SPEC QL NAA+PROBE: NOT DETECTED
HCOV NL63 RNA SPEC QL NAA+PROBE: NOT DETECTED
HCOV OC43 RNA SPEC QL NAA+PROBE: NOT DETECTED
HCT VFR BLD AUTO: 25.8 % (ref 35–47)
HCT VFR BLD AUTO: 27.5 % (ref 35–47)
HCT VFR BLD AUTO: 29.5 % (ref 35–47)
HCT VFR BLD AUTO: 31.6 % (ref 35–47)
HCT VFR BLD AUTO: 32.4 % (ref 35–47)
HCT VFR BLD AUTO: 33 % (ref 35–47)
HGB BLD-MCNC: 10.4 G/DL (ref 11.5–16)
HGB BLD-MCNC: 8.1 G/DL (ref 11.5–16)
HGB BLD-MCNC: 8.6 G/DL (ref 11.5–16)
HGB BLD-MCNC: 9.1 G/DL (ref 11.5–16)
HGB BLD-MCNC: 9.8 G/DL (ref 11.5–16)
HGB BLD-MCNC: 9.8 G/DL (ref 11.5–16)
HGB UR QL STRIP: ABNORMAL
HMPV RNA SPEC QL NAA+PROBE: NOT DETECTED
HPIV1 RNA SPEC QL NAA+PROBE: NOT DETECTED
HPIV2 RNA SPEC QL NAA+PROBE: NOT DETECTED
HPIV3 RNA SPEC QL NAA+PROBE: NOT DETECTED
HPIV4 RNA SPEC QL NAA+PROBE: NOT DETECTED
IMM GRANULOCYTES # BLD AUTO: 0 K/UL (ref 0–0.04)
IMM GRANULOCYTES # BLD AUTO: 0 K/UL (ref 0–0.04)
IMM GRANULOCYTES # BLD AUTO: 0.1 K/UL (ref 0–0.04)
IMM GRANULOCYTES # BLD AUTO: 0.1 K/UL (ref 0–0.04)
IMM GRANULOCYTES # BLD AUTO: 0.2 K/UL (ref 0–0.04)
IMM GRANULOCYTES # BLD AUTO: 0.2 K/UL (ref 0–0.04)
IMM GRANULOCYTES NFR BLD AUTO: 0 % (ref 0–0.5)
IMM GRANULOCYTES NFR BLD AUTO: 0 % (ref 0–0.5)
IMM GRANULOCYTES NFR BLD AUTO: 1 % (ref 0–0.5)
KETONES UR QL STRIP.AUTO: 80 MG/DL
LACTATE BLD-SCNC: 1.06 MMOL/L (ref 0.4–2)
LEUKOCYTE ESTERASE UR QL STRIP.AUTO: NEGATIVE
LYMPHOCYTES # BLD: 0.5 K/UL (ref 0.8–3.5)
LYMPHOCYTES # BLD: 0.6 K/UL (ref 0.8–3.5)
LYMPHOCYTES # BLD: 0.8 K/UL (ref 0.8–3.5)
LYMPHOCYTES # BLD: 0.9 K/UL (ref 0.8–3.5)
LYMPHOCYTES # BLD: 0.9 K/UL (ref 0.8–3.5)
LYMPHOCYTES # BLD: 1 K/UL (ref 0.8–3.5)
LYMPHOCYTES NFR BLD: 10 % (ref 12–49)
LYMPHOCYTES NFR BLD: 11 % (ref 12–49)
LYMPHOCYTES NFR BLD: 14 % (ref 12–49)
LYMPHOCYTES NFR BLD: 3 % (ref 12–49)
LYMPHOCYTES NFR BLD: 4 % (ref 12–49)
LYMPHOCYTES NFR BLD: 6 % (ref 12–49)
M PNEUMO DNA SPEC QL NAA+PROBE: NOT DETECTED
MCH RBC QN AUTO: 27 PG (ref 26–34)
MCH RBC QN AUTO: 27 PG (ref 26–34)
MCH RBC QN AUTO: 27.1 PG (ref 26–34)
MCH RBC QN AUTO: 27.5 PG (ref 26–34)
MCH RBC QN AUTO: 27.5 PG (ref 26–34)
MCH RBC QN AUTO: 27.7 PG (ref 26–34)
MCHC RBC AUTO-ENTMCNC: 30.2 G/DL (ref 30–36.5)
MCHC RBC AUTO-ENTMCNC: 30.8 G/DL (ref 30–36.5)
MCHC RBC AUTO-ENTMCNC: 31 G/DL (ref 30–36.5)
MCHC RBC AUTO-ENTMCNC: 31.3 G/DL (ref 30–36.5)
MCHC RBC AUTO-ENTMCNC: 31.4 G/DL (ref 30–36.5)
MCHC RBC AUTO-ENTMCNC: 31.5 G/DL (ref 30–36.5)
MCV RBC AUTO: 86.3 FL (ref 80–99)
MCV RBC AUTO: 87.3 FL (ref 80–99)
MCV RBC AUTO: 87.5 FL (ref 80–99)
MCV RBC AUTO: 88.7 FL (ref 80–99)
MCV RBC AUTO: 88.8 FL (ref 80–99)
MCV RBC AUTO: 89.3 FL (ref 80–99)
MONOCYTES # BLD: 0.3 K/UL (ref 0–1)
MONOCYTES # BLD: 0.4 K/UL (ref 0–1)
MONOCYTES # BLD: 0.5 K/UL (ref 0–1)
MONOCYTES # BLD: 0.6 K/UL (ref 0–1)
MONOCYTES NFR BLD: 2 % (ref 5–13)
MONOCYTES NFR BLD: 2 % (ref 5–13)
MONOCYTES NFR BLD: 3 % (ref 5–13)
MONOCYTES NFR BLD: 4 % (ref 5–13)
MONOCYTES NFR BLD: 5 % (ref 5–13)
MONOCYTES NFR BLD: 6 % (ref 5–13)
NEUTS SEG # BLD: 12.6 K/UL (ref 1.8–8)
NEUTS SEG # BLD: 15.1 K/UL (ref 1.8–8)
NEUTS SEG # BLD: 16 K/UL (ref 1.8–8)
NEUTS SEG # BLD: 5.1 K/UL (ref 1.8–8)
NEUTS SEG # BLD: 7.9 K/UL (ref 1.8–8)
NEUTS SEG # BLD: 8.3 K/UL (ref 1.8–8)
NEUTS SEG NFR BLD: 80 % (ref 32–75)
NEUTS SEG NFR BLD: 82 % (ref 32–75)
NEUTS SEG NFR BLD: 85 % (ref 32–75)
NEUTS SEG NFR BLD: 90 % (ref 32–75)
NEUTS SEG NFR BLD: 93 % (ref 32–75)
NEUTS SEG NFR BLD: 94 % (ref 32–75)
NITRITE UR QL STRIP.AUTO: POSITIVE
NRBC # BLD: 0 K/UL (ref 0–0.01)
NRBC BLD-RTO: 0 PER 100 WBC
P-R INTERVAL, ECG05: 164 MS
PH UR STRIP: 5.5 [PH] (ref 5–8)
PLATELET # BLD AUTO: 307 K/UL (ref 150–400)
PLATELET # BLD AUTO: 321 K/UL (ref 150–400)
PLATELET # BLD AUTO: 334 K/UL (ref 150–400)
PLATELET # BLD AUTO: 343 K/UL (ref 150–400)
PLATELET # BLD AUTO: 357 K/UL (ref 150–400)
PLATELET # BLD AUTO: 359 K/UL (ref 150–400)
PMV BLD AUTO: 8.5 FL (ref 8.9–12.9)
PMV BLD AUTO: 8.8 FL (ref 8.9–12.9)
PMV BLD AUTO: 8.9 FL (ref 8.9–12.9)
PMV BLD AUTO: 9 FL (ref 8.9–12.9)
PMV BLD AUTO: 9 FL (ref 8.9–12.9)
PMV BLD AUTO: 9.5 FL (ref 8.9–12.9)
POTASSIUM BLD-SCNC: 4.3 MMOL/L (ref 3.5–5.1)
POTASSIUM SERPL-SCNC: 3.2 MMOL/L (ref 3.5–5.1)
POTASSIUM SERPL-SCNC: 3.3 MMOL/L (ref 3.5–5.1)
POTASSIUM SERPL-SCNC: 3.6 MMOL/L (ref 3.5–5.1)
POTASSIUM SERPL-SCNC: 3.7 MMOL/L (ref 3.5–5.1)
POTASSIUM SERPL-SCNC: 4.5 MMOL/L (ref 3.5–5.1)
PROT SERPL-MCNC: 7.7 G/DL (ref 6.4–8.2)
PROT UR STRIP-MCNC: 30 MG/DL
Q-T INTERVAL, ECG07: 360 MS
QRS DURATION, ECG06: 98 MS
QTC CALCULATION (BEZET), ECG08: 454 MS
RBC # BLD AUTO: 2.99 M/UL (ref 3.8–5.2)
RBC # BLD AUTO: 3.1 M/UL (ref 3.8–5.2)
RBC # BLD AUTO: 3.37 M/UL (ref 3.8–5.2)
RBC # BLD AUTO: 3.56 M/UL (ref 3.8–5.2)
RBC # BLD AUTO: 3.63 M/UL (ref 3.8–5.2)
RBC # BLD AUTO: 3.78 M/UL (ref 3.8–5.2)
RBC #/AREA URNS HPF: ABNORMAL /HPF (ref 0–5)
RBC MORPH BLD: ABNORMAL
RSV RNA SPEC QL NAA+PROBE: NOT DETECTED
RV+EV RNA SPEC QL NAA+PROBE: NOT DETECTED
SARS-COV-2 PCR, COVPCR: NOT DETECTED
SARS-COV-2, XPLCVT: NOT DETECTED
SERVICE CMNT-IMP: ABNORMAL
SERVICE CMNT-IMP: NORMAL
SODIUM BLD-SCNC: 139 MMOL/L (ref 136–145)
SODIUM SERPL-SCNC: 132 MMOL/L (ref 136–145)
SODIUM SERPL-SCNC: 134 MMOL/L (ref 136–145)
SODIUM SERPL-SCNC: 135 MMOL/L (ref 136–145)
SODIUM SERPL-SCNC: 136 MMOL/L (ref 136–145)
SODIUM SERPL-SCNC: 136 MMOL/L (ref 136–145)
SOURCE, COVRS: NORMAL
SP GR UR REFRACTOMETRY: 1.01 (ref 1–1.03)
UR CULT HOLD, URHOLD: NORMAL
UROBILINOGEN UR QL STRIP.AUTO: 0.2 EU/DL (ref 0.2–1)
VENTRICULAR RATE, ECG03: 96 BPM
WBC # BLD AUTO: 13.9 K/UL (ref 3.6–11)
WBC # BLD AUTO: 16.2 K/UL (ref 3.6–11)
WBC # BLD AUTO: 17 K/UL (ref 3.6–11)
WBC # BLD AUTO: 6.4 K/UL (ref 3.6–11)
WBC # BLD AUTO: 9.5 K/UL (ref 3.6–11)
WBC # BLD AUTO: 9.7 K/UL (ref 3.6–11)
WBC URNS QL MICRO: ABNORMAL /HPF (ref 0–4)

## 2022-01-01 PROCEDURE — 74011250636 HC RX REV CODE- 250/636: Performed by: INTERNAL MEDICINE

## 2022-01-01 PROCEDURE — 99284 EMERGENCY DEPT VISIT MOD MDM: CPT

## 2022-01-01 PROCEDURE — 3336500001 HSPC ELECTION

## 2022-01-01 PROCEDURE — 92610 EVALUATE SWALLOWING FUNCTION: CPT

## 2022-01-01 PROCEDURE — 74011250637 HC RX REV CODE- 250/637: Performed by: INTERNAL MEDICINE

## 2022-01-01 PROCEDURE — 99223 1ST HOSP IP/OBS HIGH 75: CPT | Performed by: INTERNAL MEDICINE

## 2022-01-01 PROCEDURE — 1101F PT FALLS ASSESS-DOCD LE1/YR: CPT | Performed by: INTERNAL MEDICINE

## 2022-01-01 PROCEDURE — 65270000029 HC RM PRIVATE

## 2022-01-01 PROCEDURE — 74011250636 HC RX REV CODE- 250/636: Performed by: HOSPITALIST

## 2022-01-01 PROCEDURE — 74011000250 HC RX REV CODE- 250: Performed by: INTERNAL MEDICINE

## 2022-01-01 PROCEDURE — 87635 SARS-COV-2 COVID-19 AMP PRB: CPT

## 2022-01-01 PROCEDURE — 3331090004 HSPC SERVICE INTENSITY ADD-ON

## 2022-01-01 PROCEDURE — 85025 COMPLETE CBC W/AUTO DIFF WBC: CPT

## 2022-01-01 PROCEDURE — 80048 BASIC METABOLIC PNL TOTAL CA: CPT

## 2022-01-01 PROCEDURE — 86140 C-REACTIVE PROTEIN: CPT

## 2022-01-01 PROCEDURE — 93005 ELECTROCARDIOGRAM TRACING: CPT

## 2022-01-01 PROCEDURE — G8420 CALC BMI NORM PARAMETERS: HCPCS | Performed by: INTERNAL MEDICINE

## 2022-01-01 PROCEDURE — 87040 BLOOD CULTURE FOR BACTERIA: CPT

## 2022-01-01 PROCEDURE — 74011250636 HC RX REV CODE- 250/636: Performed by: STUDENT IN AN ORGANIZED HEALTH CARE EDUCATION/TRAINING PROGRAM

## 2022-01-01 PROCEDURE — 96368 THER/DIAG CONCURRENT INF: CPT

## 2022-01-01 PROCEDURE — 96375 TX/PRO/DX INJ NEW DRUG ADDON: CPT

## 2022-01-01 PROCEDURE — 36415 COLL VENOUS BLD VENIPUNCTURE: CPT

## 2022-01-01 PROCEDURE — G8427 DOCREV CUR MEDS BY ELIG CLIN: HCPCS | Performed by: INTERNAL MEDICINE

## 2022-01-01 PROCEDURE — 74011250637 HC RX REV CODE- 250/637: Performed by: NURSE PRACTITIONER

## 2022-01-01 PROCEDURE — 77030027138 HC INCENT SPIROMETER -A

## 2022-01-01 PROCEDURE — 0656 HSPC GENERAL INPATIENT

## 2022-01-01 PROCEDURE — 85652 RBC SED RATE AUTOMATED: CPT

## 2022-01-01 PROCEDURE — G0299 HHS/HOSPICE OF RN EA 15 MIN: HCPCS

## 2022-01-01 PROCEDURE — 74011250637 HC RX REV CODE- 250/637: Performed by: EMERGENCY MEDICINE

## 2022-01-01 PROCEDURE — 96376 TX/PRO/DX INJ SAME DRUG ADON: CPT

## 2022-01-01 PROCEDURE — 97530 THERAPEUTIC ACTIVITIES: CPT

## 2022-01-01 PROCEDURE — 96365 THER/PROPH/DIAG IV INF INIT: CPT

## 2022-01-01 PROCEDURE — 83605 ASSAY OF LACTIC ACID: CPT

## 2022-01-01 PROCEDURE — 70450 CT HEAD/BRAIN W/O DYE: CPT

## 2022-01-01 PROCEDURE — 0202U NFCT DS 22 TRGT SARS-COV-2: CPT

## 2022-01-01 PROCEDURE — G0156 HHCP-SVS OF AIDE,EA 15 MIN: HCPCS

## 2022-01-01 PROCEDURE — 0651 HSPC ROUTINE HOME CARE

## 2022-01-01 PROCEDURE — 3336590001 HSPC ROOM AND BOARD

## 2022-01-01 PROCEDURE — 80053 COMPREHEN METABOLIC PANEL: CPT

## 2022-01-01 PROCEDURE — 70551 MRI BRAIN STEM W/O DYE: CPT

## 2022-01-01 PROCEDURE — 71045 X-RAY EXAM CHEST 1 VIEW: CPT

## 2022-01-01 PROCEDURE — 77030038269 HC DRN EXT URIN PURWCK BARD -A

## 2022-01-01 PROCEDURE — 97161 PT EVAL LOW COMPLEX 20 MIN: CPT

## 2022-01-01 PROCEDURE — 80047 BASIC METABLC PNL IONIZED CA: CPT

## 2022-01-01 PROCEDURE — 96366 THER/PROPH/DIAG IV INF ADDON: CPT

## 2022-01-01 PROCEDURE — U0005 INFEC AGEN DETEC AMPLI PROBE: HCPCS

## 2022-01-01 PROCEDURE — 74011250636 HC RX REV CODE- 250/636: Performed by: EMERGENCY MEDICINE

## 2022-01-01 PROCEDURE — 99233 SBSQ HOSP IP/OBS HIGH 50: CPT | Performed by: INTERNAL MEDICINE

## 2022-01-01 PROCEDURE — 99214 OFFICE O/P EST MOD 30 MIN: CPT | Performed by: INTERNAL MEDICINE

## 2022-01-01 PROCEDURE — 74011000250 HC RX REV CODE- 250: Performed by: EMERGENCY MEDICINE

## 2022-01-01 PROCEDURE — 87086 URINE CULTURE/COLONY COUNT: CPT

## 2022-01-01 PROCEDURE — G8510 SCR DEP NEG, NO PLAN REQD: HCPCS | Performed by: INTERNAL MEDICINE

## 2022-01-01 PROCEDURE — 81001 URINALYSIS AUTO W/SCOPE: CPT

## 2022-01-01 PROCEDURE — G8536 NO DOC ELDER MAL SCRN: HCPCS | Performed by: INTERNAL MEDICINE

## 2022-01-01 PROCEDURE — 96374 THER/PROPH/DIAG INJ IV PUSH: CPT

## 2022-01-01 PROCEDURE — 70544 MR ANGIOGRAPHY HEAD W/O DYE: CPT

## 2022-01-01 PROCEDURE — 1090F PRES/ABSN URINE INCON ASSESS: CPT | Performed by: INTERNAL MEDICINE

## 2022-01-01 RX ORDER — LORAZEPAM 2 MG/ML
1 INJECTION INTRAMUSCULAR EVERY 4 HOURS
Status: DISCONTINUED | OUTPATIENT
Start: 2022-01-01 | End: 2022-01-01 | Stop reason: HOSPADM

## 2022-01-01 RX ORDER — DOCUSATE SODIUM 100 MG/1
100 CAPSULE, LIQUID FILLED ORAL DAILY
Qty: 20 CAPSULE | Refills: 0 | Status: SHIPPED | OUTPATIENT
Start: 2022-01-01 | End: 2022-02-22

## 2022-01-01 RX ORDER — ONDANSETRON 4 MG/1
4 TABLET, ORALLY DISINTEGRATING ORAL
Status: DISCONTINUED | OUTPATIENT
Start: 2022-01-01 | End: 2022-01-01 | Stop reason: HOSPADM

## 2022-01-01 RX ORDER — SODIUM CHLORIDE 0.9 % (FLUSH) 0.9 %
5-10 SYRINGE (ML) INJECTION AS NEEDED
Status: DISCONTINUED | OUTPATIENT
Start: 2022-01-01 | End: 2022-01-01 | Stop reason: HOSPADM

## 2022-01-01 RX ORDER — GABAPENTIN 300 MG/1
300 CAPSULE ORAL 2 TIMES DAILY
Status: DISCONTINUED | OUTPATIENT
Start: 2022-01-01 | End: 2022-01-01 | Stop reason: HOSPADM

## 2022-01-01 RX ORDER — HYDROCODONE BITARTRATE AND ACETAMINOPHEN 5; 325 MG/1; MG/1
.5-1 TABLET ORAL
COMMUNITY
End: 2022-01-01

## 2022-01-01 RX ORDER — MORPHINE SULFATE 2 MG/ML
1 INJECTION, SOLUTION INTRAMUSCULAR; INTRAVENOUS ONCE
Status: COMPLETED | OUTPATIENT
Start: 2022-01-01 | End: 2022-01-01

## 2022-01-01 RX ORDER — HYDROMORPHONE HYDROCHLORIDE 1 MG/ML
0.5 INJECTION, SOLUTION INTRAMUSCULAR; INTRAVENOUS; SUBCUTANEOUS
Status: DISCONTINUED | OUTPATIENT
Start: 2022-01-01 | End: 2022-01-01

## 2022-01-01 RX ORDER — AMLODIPINE BESYLATE 5 MG/1
10 TABLET ORAL DAILY
Status: DISCONTINUED | OUTPATIENT
Start: 2022-01-01 | End: 2022-01-01

## 2022-01-01 RX ORDER — VALSARTAN 80 MG/1
160 TABLET ORAL DAILY
Status: DISCONTINUED | OUTPATIENT
Start: 2022-01-01 | End: 2022-01-01 | Stop reason: HOSPADM

## 2022-01-01 RX ORDER — ACETAMINOPHEN 650 MG/1
650 SUPPOSITORY RECTAL
Status: DISCONTINUED | OUTPATIENT
Start: 2022-01-01 | End: 2022-01-01 | Stop reason: HOSPADM

## 2022-01-01 RX ORDER — HYDROCODONE BITARTRATE AND ACETAMINOPHEN 5; 325 MG/1; MG/1
0.5 TABLET ORAL
Status: DISCONTINUED | OUTPATIENT
Start: 2022-01-01 | End: 2022-01-01 | Stop reason: HOSPADM

## 2022-01-01 RX ORDER — ACETAMINOPHEN 650 MG/1
975 SUPPOSITORY RECTAL
Status: COMPLETED | OUTPATIENT
Start: 2022-01-01 | End: 2022-01-01

## 2022-01-01 RX ORDER — AZITHROMYCIN 250 MG/1
500 TABLET, FILM COATED ORAL DAILY
Status: DISCONTINUED | OUTPATIENT
Start: 2022-01-01 | End: 2022-01-01 | Stop reason: HOSPADM

## 2022-01-01 RX ORDER — HYDROMORPHONE HYDROCHLORIDE 1 MG/ML
1 INJECTION, SOLUTION INTRAMUSCULAR; INTRAVENOUS; SUBCUTANEOUS EVERY 4 HOURS
Status: DISCONTINUED | OUTPATIENT
Start: 2022-01-01 | End: 2022-01-01 | Stop reason: HOSPADM

## 2022-01-01 RX ORDER — VALSARTAN 80 MG/1
160 TABLET ORAL DAILY
Status: DISCONTINUED | OUTPATIENT
Start: 2022-01-01 | End: 2022-01-01

## 2022-01-01 RX ORDER — HYDROMORPHONE HYDROCHLORIDE 1 MG/ML
0.2 INJECTION, SOLUTION INTRAMUSCULAR; INTRAVENOUS; SUBCUTANEOUS
Status: DISCONTINUED | OUTPATIENT
Start: 2022-01-01 | End: 2022-01-01 | Stop reason: HOSPADM

## 2022-01-01 RX ORDER — LORAZEPAM 2 MG/ML
1 INJECTION INTRAMUSCULAR
Status: DISCONTINUED | OUTPATIENT
Start: 2022-01-01 | End: 2022-01-01 | Stop reason: HOSPADM

## 2022-01-01 RX ORDER — HYDROMORPHONE HYDROCHLORIDE 1 MG/ML
1 INJECTION, SOLUTION INTRAMUSCULAR; INTRAVENOUS; SUBCUTANEOUS
Status: DISCONTINUED | OUTPATIENT
Start: 2022-01-01 | End: 2022-01-01 | Stop reason: HOSPADM

## 2022-01-01 RX ORDER — DIAZEPAM 2 MG/1
2 TABLET ORAL
Qty: 13 TABLET | Refills: 0 | Status: SHIPPED | OUTPATIENT
Start: 2022-01-01

## 2022-01-01 RX ORDER — ENOXAPARIN SODIUM 100 MG/ML
40 INJECTION SUBCUTANEOUS DAILY
Status: DISCONTINUED | OUTPATIENT
Start: 2022-01-01 | End: 2022-01-01 | Stop reason: HOSPADM

## 2022-01-01 RX ORDER — POLYETHYLENE GLYCOL 3350 17 G/17G
17 POWDER, FOR SOLUTION ORAL DAILY PRN
Status: DISCONTINUED | OUTPATIENT
Start: 2022-01-01 | End: 2022-01-01 | Stop reason: HOSPADM

## 2022-01-01 RX ORDER — KETOROLAC TROMETHAMINE 30 MG/ML
15 INJECTION, SOLUTION INTRAMUSCULAR; INTRAVENOUS
Status: DISCONTINUED | OUTPATIENT
Start: 2022-01-01 | End: 2022-01-01 | Stop reason: HOSPADM

## 2022-01-01 RX ORDER — GADOTERATE MEGLUMINE 376.9 MG/ML
10 INJECTION INTRAVENOUS
Status: DISCONTINUED | OUTPATIENT
Start: 2022-01-01 | End: 2022-01-01

## 2022-01-01 RX ORDER — AZITHROMYCIN 250 MG/1
500 TABLET, FILM COATED ORAL DAILY
Qty: 6 TABLET | Refills: 0 | Status: SHIPPED
Start: 2022-01-01

## 2022-01-01 RX ORDER — LIDOCAINE 50 MG/G
1 PATCH TOPICAL
COMMUNITY

## 2022-01-01 RX ORDER — CYCLOBENZAPRINE HCL 5 MG
2.5 TABLET ORAL
COMMUNITY

## 2022-01-01 RX ORDER — ONDANSETRON 2 MG/ML
4 INJECTION INTRAMUSCULAR; INTRAVENOUS
Status: DISCONTINUED | OUTPATIENT
Start: 2022-01-01 | End: 2022-01-01 | Stop reason: HOSPADM

## 2022-01-01 RX ORDER — DICLOFENAC SODIUM 10 MG/G
4 GEL TOPICAL 4 TIMES DAILY
Status: DISCONTINUED | OUTPATIENT
Start: 2022-01-01 | End: 2022-01-01

## 2022-01-01 RX ORDER — KETOROLAC TROMETHAMINE 30 MG/ML
15 INJECTION, SOLUTION INTRAMUSCULAR; INTRAVENOUS
Status: COMPLETED | OUTPATIENT
Start: 2022-01-01 | End: 2022-01-01

## 2022-01-01 RX ORDER — CLINDAMYCIN HYDROCHLORIDE 300 MG/1
300 CAPSULE ORAL 4 TIMES DAILY
Status: ON HOLD | COMMUNITY
End: 2022-01-01

## 2022-01-01 RX ORDER — HYDROCODONE BITARTRATE AND ACETAMINOPHEN 5; 325 MG/1; MG/1
1 TABLET ORAL
Status: DISCONTINUED | OUTPATIENT
Start: 2022-01-01 | End: 2022-01-01

## 2022-01-01 RX ORDER — HYDROCODONE BITARTRATE AND ACETAMINOPHEN 5; 325 MG/1; MG/1
1 TABLET ORAL
Status: DISCONTINUED | OUTPATIENT
Start: 2022-01-01 | End: 2022-01-01 | Stop reason: HOSPADM

## 2022-01-01 RX ORDER — AMLODIPINE BESYLATE 5 MG/1
10 TABLET ORAL DAILY
Status: DISCONTINUED | OUTPATIENT
Start: 2022-01-01 | End: 2022-01-01 | Stop reason: HOSPADM

## 2022-01-01 RX ORDER — FENTANYL CITRATE 50 UG/ML
25 INJECTION, SOLUTION INTRAMUSCULAR; INTRAVENOUS ONCE
Status: COMPLETED | OUTPATIENT
Start: 2022-01-01 | End: 2022-01-01

## 2022-01-01 RX ORDER — FACIAL-BODY WIPES
10 EACH TOPICAL DAILY PRN
Status: DISCONTINUED | OUTPATIENT
Start: 2022-01-01 | End: 2022-01-01 | Stop reason: HOSPADM

## 2022-01-01 RX ORDER — ZOLPIDEM TARTRATE 5 MG/1
5 TABLET ORAL
Status: DISCONTINUED | OUTPATIENT
Start: 2022-01-01 | End: 2022-01-01

## 2022-01-01 RX ORDER — GLYCOPYRROLATE 0.2 MG/ML
0.2 INJECTION INTRAMUSCULAR; INTRAVENOUS
Status: DISCONTINUED | OUTPATIENT
Start: 2022-01-01 | End: 2022-01-01 | Stop reason: HOSPADM

## 2022-01-01 RX ORDER — DICLOFENAC SODIUM 10 MG/G
4 GEL TOPICAL
Status: DISCONTINUED | OUTPATIENT
Start: 2022-01-01 | End: 2022-01-01 | Stop reason: HOSPADM

## 2022-01-01 RX ORDER — AMLODIPINE BESYLATE 10 MG/1
TABLET ORAL
Qty: 30 TABLET | Refills: 5 | Status: SHIPPED | OUTPATIENT
Start: 2022-01-01

## 2022-01-01 RX ORDER — HYDROCODONE BITARTRATE AND ACETAMINOPHEN 5; 325 MG/1; MG/1
1 TABLET ORAL
Qty: 11 TABLET | Refills: 0 | Status: ON HOLD | OUTPATIENT
Start: 2022-01-01 | End: 2022-01-01

## 2022-01-01 RX ORDER — SODIUM CHLORIDE 0.9 % (FLUSH) 0.9 %
5-40 SYRINGE (ML) INJECTION AS NEEDED
Status: DISCONTINUED | OUTPATIENT
Start: 2022-01-01 | End: 2022-01-01 | Stop reason: HOSPADM

## 2022-01-01 RX ORDER — SODIUM CHLORIDE, SODIUM LACTATE, POTASSIUM CHLORIDE, CALCIUM CHLORIDE 600; 310; 30; 20 MG/100ML; MG/100ML; MG/100ML; MG/100ML
50 INJECTION, SOLUTION INTRAVENOUS CONTINUOUS
Status: DISCONTINUED | OUTPATIENT
Start: 2022-01-01 | End: 2022-01-01

## 2022-01-01 RX ORDER — PANTOPRAZOLE SODIUM 40 MG/1
40 TABLET, DELAYED RELEASE ORAL
Status: DISCONTINUED | OUTPATIENT
Start: 2022-01-01 | End: 2022-01-01 | Stop reason: HOSPADM

## 2022-01-01 RX ORDER — METOPROLOL TARTRATE 25 MG/1
12.5 TABLET, FILM COATED ORAL 2 TIMES DAILY
Status: DISCONTINUED | OUTPATIENT
Start: 2022-01-01 | End: 2022-01-01 | Stop reason: HOSPADM

## 2022-01-01 RX ORDER — METOPROLOL TARTRATE 25 MG/1
12.5 TABLET, FILM COATED ORAL 2 TIMES DAILY
Status: DISCONTINUED | OUTPATIENT
Start: 2022-01-01 | End: 2022-01-01

## 2022-01-01 RX ORDER — HYDROCODONE BITARTRATE AND ACETAMINOPHEN 5; 325 MG/1; MG/1
0.5 TABLET ORAL
Status: DISCONTINUED | OUTPATIENT
Start: 2022-01-01 | End: 2022-01-01

## 2022-01-01 RX ORDER — HYDROCODONE BITARTRATE AND ACETAMINOPHEN 5; 325 MG/1; MG/1
.5-1 TABLET ORAL
Status: DISCONTINUED | OUTPATIENT
Start: 2022-01-01 | End: 2022-01-01

## 2022-01-01 RX ORDER — SODIUM CHLORIDE 0.9 % (FLUSH) 0.9 %
5-40 SYRINGE (ML) INJECTION EVERY 8 HOURS
Status: DISCONTINUED | OUTPATIENT
Start: 2022-01-01 | End: 2022-01-01 | Stop reason: HOSPADM

## 2022-01-01 RX ORDER — ACETAMINOPHEN 500 MG
1000 TABLET ORAL ONCE
Status: DISCONTINUED | OUTPATIENT
Start: 2022-01-01 | End: 2022-01-01

## 2022-01-01 RX ORDER — PANTOPRAZOLE SODIUM 40 MG/1
40 TABLET, DELAYED RELEASE ORAL
Status: DISCONTINUED | OUTPATIENT
Start: 2022-01-01 | End: 2022-01-01

## 2022-01-01 RX ORDER — ACETAMINOPHEN 325 MG/1
650 TABLET ORAL
Status: DISCONTINUED | OUTPATIENT
Start: 2022-01-01 | End: 2022-01-01

## 2022-01-01 RX ORDER — GABAPENTIN 100 MG/1
300 CAPSULE ORAL 2 TIMES DAILY
Qty: 180 CAPSULE | Refills: 0 | Status: SHIPPED | OUTPATIENT
Start: 2022-01-01

## 2022-01-01 RX ORDER — ONDANSETRON 4 MG/1
4 TABLET, ORALLY DISINTEGRATING ORAL
Status: DISCONTINUED | OUTPATIENT
Start: 2022-01-01 | End: 2022-01-01

## 2022-01-01 RX ORDER — AMOXICILLIN 250 MG
1 CAPSULE ORAL DAILY
Status: DISCONTINUED | OUTPATIENT
Start: 2022-01-01 | End: 2022-01-01

## 2022-01-01 RX ORDER — GABAPENTIN 300 MG/1
300 CAPSULE ORAL 2 TIMES DAILY
Status: DISCONTINUED | OUTPATIENT
Start: 2022-01-01 | End: 2022-01-01

## 2022-01-01 RX ORDER — AZITHROMYCIN 250 MG/1
500 TABLET, FILM COATED ORAL DAILY
Status: DISCONTINUED | OUTPATIENT
Start: 2022-01-01 | End: 2022-01-01

## 2022-01-01 RX ORDER — LORAZEPAM 2 MG/ML
0.5 INJECTION INTRAMUSCULAR
Status: DISCONTINUED | OUTPATIENT
Start: 2022-01-01 | End: 2022-01-01

## 2022-01-01 RX ORDER — FACIAL-BODY WIPES
10 EACH TOPICAL DAILY PRN
Status: DISCONTINUED | OUTPATIENT
Start: 2022-01-01 | End: 2022-01-01 | Stop reason: SDUPTHER

## 2022-01-01 RX ORDER — LIDOCAINE 4 G/100G
1 PATCH TOPICAL EVERY 24 HOURS
Status: DISCONTINUED | OUTPATIENT
Start: 2022-01-01 | End: 2022-01-01 | Stop reason: HOSPADM

## 2022-01-01 RX ORDER — LIDOCAINE 4 G/100G
1 PATCH TOPICAL
Status: DISCONTINUED | OUTPATIENT
Start: 2022-01-01 | End: 2022-01-01

## 2022-01-01 RX ORDER — ACETAMINOPHEN 325 MG/1
650 TABLET ORAL
Status: DISCONTINUED | OUTPATIENT
Start: 2022-01-01 | End: 2022-01-01 | Stop reason: HOSPADM

## 2022-01-01 RX ORDER — CIPROFLOXACIN 250 MG/1
250 TABLET, FILM COATED ORAL EVERY 12 HOURS
Status: DISCONTINUED | OUTPATIENT
Start: 2022-01-01 | End: 2022-01-01

## 2022-01-01 RX ADMIN — SODIUM CHLORIDE, PRESERVATIVE FREE 10 ML: 5 INJECTION INTRAVENOUS at 23:02

## 2022-01-01 RX ADMIN — SODIUM CHLORIDE 1000 ML: 9 INJECTION, SOLUTION INTRAVENOUS at 20:28

## 2022-01-01 RX ADMIN — GABAPENTIN 300 MG: 300 CAPSULE ORAL at 09:13

## 2022-01-01 RX ADMIN — GABAPENTIN 300 MG: 300 CAPSULE ORAL at 18:17

## 2022-01-01 RX ADMIN — HYDROCODONE BITARTRATE AND ACETAMINOPHEN 0.5 TABLET: 5; 325 TABLET ORAL at 05:25

## 2022-01-01 RX ADMIN — CIPROFLOXACIN 250 MG: 250 TABLET, FILM COATED ORAL at 09:53

## 2022-01-01 RX ADMIN — GABAPENTIN 300 MG: 300 CAPSULE ORAL at 09:53

## 2022-01-01 RX ADMIN — VALSARTAN 160 MG: 80 TABLET, FILM COATED ORAL at 08:14

## 2022-01-01 RX ADMIN — AMLODIPINE BESYLATE 10 MG: 5 TABLET ORAL at 09:22

## 2022-01-01 RX ADMIN — PANTOPRAZOLE SODIUM 40 MG: 40 TABLET, DELAYED RELEASE ORAL at 09:53

## 2022-01-01 RX ADMIN — POTASSIUM BICARBONATE 20 MEQ: 782 TABLET, EFFERVESCENT ORAL at 17:23

## 2022-01-01 RX ADMIN — LORAZEPAM 0.5 MG: 2 INJECTION INTRAMUSCULAR; INTRAVENOUS at 06:14

## 2022-01-01 RX ADMIN — ACETAMINOPHEN 650 MG: 325 TABLET ORAL at 12:45

## 2022-01-01 RX ADMIN — SODIUM CHLORIDE 1 G: 9 INJECTION INTRAMUSCULAR; INTRAVENOUS; SUBCUTANEOUS at 22:44

## 2022-01-01 RX ADMIN — SODIUM CHLORIDE 1 G: 9 INJECTION INTRAMUSCULAR; INTRAVENOUS; SUBCUTANEOUS at 00:03

## 2022-01-01 RX ADMIN — METOPROLOL TARTRATE 12.5 MG: 25 TABLET, FILM COATED ORAL at 08:14

## 2022-01-01 RX ADMIN — LORAZEPAM 0.5 MG: 2 INJECTION INTRAMUSCULAR; INTRAVENOUS at 03:12

## 2022-01-01 RX ADMIN — HYDROMORPHONE HYDROCHLORIDE 0.5 MG: 1 INJECTION, SOLUTION INTRAMUSCULAR; INTRAVENOUS; SUBCUTANEOUS at 03:37

## 2022-01-01 RX ADMIN — ENOXAPARIN SODIUM 40 MG: 100 INJECTION SUBCUTANEOUS at 09:02

## 2022-01-01 RX ADMIN — ACETAMINOPHEN 650 MG: 325 TABLET ORAL at 18:35

## 2022-01-01 RX ADMIN — DOCUSATE SODIUM 50MG AND SENNOSIDES 8.6MG 1 TABLET: 8.6; 5 TABLET, FILM COATED ORAL at 09:54

## 2022-01-01 RX ADMIN — METOPROLOL TARTRATE 12.5 MG: 25 TABLET, FILM COATED ORAL at 11:13

## 2022-01-01 RX ADMIN — HYDROMORPHONE HYDROCHLORIDE 0.2 MG: 1 INJECTION, SOLUTION INTRAMUSCULAR; INTRAVENOUS; SUBCUTANEOUS at 23:03

## 2022-01-01 RX ADMIN — MORPHINE SULFATE 1 MG: 2 INJECTION, SOLUTION INTRAMUSCULAR; INTRAVENOUS at 01:03

## 2022-01-01 RX ADMIN — METOPROLOL TARTRATE 12.5 MG: 25 TABLET, FILM COATED ORAL at 05:25

## 2022-01-01 RX ADMIN — HYDROMORPHONE HYDROCHLORIDE 0.5 MG: 1 INJECTION, SOLUTION INTRAMUSCULAR; INTRAVENOUS; SUBCUTANEOUS at 12:51

## 2022-01-01 RX ADMIN — METOPROLOL TARTRATE 12.5 MG: 25 TABLET, FILM COATED ORAL at 18:01

## 2022-01-01 RX ADMIN — HYDROMORPHONE HYDROCHLORIDE 0.5 MG: 1 INJECTION, SOLUTION INTRAMUSCULAR; INTRAVENOUS; SUBCUTANEOUS at 06:14

## 2022-01-01 RX ADMIN — SODIUM CHLORIDE, PRESERVATIVE FREE 10 ML: 5 INJECTION INTRAVENOUS at 06:14

## 2022-01-01 RX ADMIN — SODIUM CHLORIDE 1000 ML: 9 INJECTION, SOLUTION INTRAVENOUS at 19:15

## 2022-01-01 RX ADMIN — AZITHROMYCIN MONOHYDRATE 500 MG: 500 INJECTION, POWDER, LYOPHILIZED, FOR SOLUTION INTRAVENOUS at 08:13

## 2022-01-01 RX ADMIN — HYDROMORPHONE HYDROCHLORIDE 1 MG: 1 INJECTION, SOLUTION INTRAMUSCULAR; INTRAVENOUS; SUBCUTANEOUS at 08:27

## 2022-01-01 RX ADMIN — SODIUM CHLORIDE, PRESERVATIVE FREE 10 ML: 5 INJECTION INTRAVENOUS at 21:36

## 2022-01-01 RX ADMIN — AMLODIPINE BESYLATE 10 MG: 5 TABLET ORAL at 09:52

## 2022-01-01 RX ADMIN — POTASSIUM BICARBONATE 20 MEQ: 782 TABLET, EFFERVESCENT ORAL at 09:03

## 2022-01-01 RX ADMIN — HYDROMORPHONE HYDROCHLORIDE 0.5 MG: 1 INJECTION, SOLUTION INTRAMUSCULAR; INTRAVENOUS; SUBCUTANEOUS at 23:03

## 2022-01-01 RX ADMIN — HYDROCODONE BITARTRATE AND ACETAMINOPHEN 0.5 TABLET: 5; 325 TABLET ORAL at 22:55

## 2022-01-01 RX ADMIN — GABAPENTIN 300 MG: 300 CAPSULE ORAL at 18:01

## 2022-01-01 RX ADMIN — METOPROLOL TARTRATE 12.5 MG: 25 TABLET, FILM COATED ORAL at 09:11

## 2022-01-01 RX ADMIN — VALSARTAN 160 MG: 80 TABLET, FILM COATED ORAL at 09:03

## 2022-01-01 RX ADMIN — VANCOMYCIN HYDROCHLORIDE 1250 MG: 1.25 INJECTION, POWDER, LYOPHILIZED, FOR SOLUTION INTRAVENOUS at 20:46

## 2022-01-01 RX ADMIN — SODIUM CHLORIDE, PRESERVATIVE FREE 10 ML: 5 INJECTION INTRAVENOUS at 03:37

## 2022-01-01 RX ADMIN — AZITHROMYCIN MONOHYDRATE 500 MG: 250 TABLET ORAL at 09:13

## 2022-01-01 RX ADMIN — HYDROMORPHONE HYDROCHLORIDE 0.5 MG: 1 INJECTION, SOLUTION INTRAMUSCULAR; INTRAVENOUS; SUBCUTANEOUS at 03:12

## 2022-01-01 RX ADMIN — VALSARTAN 160 MG: 80 TABLET, FILM COATED ORAL at 09:13

## 2022-01-01 RX ADMIN — METOPROLOL TARTRATE 12.5 MG: 25 TABLET, FILM COATED ORAL at 09:13

## 2022-01-01 RX ADMIN — HYDROCODONE BITARTRATE AND ACETAMINOPHEN 0.5 TABLET: 5; 325 TABLET ORAL at 18:21

## 2022-01-01 RX ADMIN — FENTANYL CITRATE 25 MCG: 50 INJECTION, SOLUTION INTRAMUSCULAR; INTRAVENOUS at 11:54

## 2022-01-01 RX ADMIN — LORAZEPAM 0.5 MG: 2 INJECTION INTRAMUSCULAR; INTRAVENOUS at 07:31

## 2022-01-01 RX ADMIN — HYDROCODONE BITARTRATE AND ACETAMINOPHEN 1 TABLET: 5; 325 TABLET ORAL at 16:11

## 2022-01-01 RX ADMIN — AMLODIPINE BESYLATE 10 MG: 5 TABLET ORAL at 08:14

## 2022-01-01 RX ADMIN — GLYCOPYRROLATE 0.2 MG: 0.2 INJECTION, SOLUTION INTRAMUSCULAR; INTRAVENOUS at 03:17

## 2022-01-01 RX ADMIN — GABAPENTIN 300 MG: 300 CAPSULE ORAL at 17:23

## 2022-01-01 RX ADMIN — KETOROLAC TROMETHAMINE 15 MG: 30 INJECTION, SOLUTION INTRAMUSCULAR at 20:57

## 2022-01-01 RX ADMIN — GABAPENTIN 300 MG: 300 CAPSULE ORAL at 08:14

## 2022-01-01 RX ADMIN — HYDROMORPHONE HYDROCHLORIDE 0.5 MG: 1 INJECTION, SOLUTION INTRAMUSCULAR; INTRAVENOUS; SUBCUTANEOUS at 16:37

## 2022-01-01 RX ADMIN — AMLODIPINE BESYLATE 10 MG: 5 TABLET ORAL at 09:03

## 2022-01-01 RX ADMIN — HYDROCODONE BITARTRATE AND ACETAMINOPHEN 0.5 TABLET: 5; 325 TABLET ORAL at 16:17

## 2022-01-01 RX ADMIN — AMLODIPINE BESYLATE 10 MG: 5 TABLET ORAL at 09:13

## 2022-01-01 RX ADMIN — POTASSIUM BICARBONATE 20 MEQ: 782 TABLET, EFFERVESCENT ORAL at 09:21

## 2022-01-01 RX ADMIN — SODIUM CHLORIDE, POTASSIUM CHLORIDE, SODIUM LACTATE AND CALCIUM CHLORIDE 50 ML/HR: 600; 310; 30; 20 INJECTION, SOLUTION INTRAVENOUS at 05:17

## 2022-01-01 RX ADMIN — METOPROLOL TARTRATE 12.5 MG: 25 TABLET, FILM COATED ORAL at 17:13

## 2022-01-01 RX ADMIN — HYDROCODONE BITARTRATE AND ACETAMINOPHEN 0.5 TABLET: 5; 325 TABLET ORAL at 09:12

## 2022-01-01 RX ADMIN — ENOXAPARIN SODIUM 40 MG: 100 INJECTION SUBCUTANEOUS at 09:20

## 2022-01-01 RX ADMIN — METOPROLOL TARTRATE 12.5 MG: 25 TABLET, FILM COATED ORAL at 17:23

## 2022-01-01 RX ADMIN — LORAZEPAM 1 MG: 2 INJECTION INTRAMUSCULAR; INTRAVENOUS at 08:30

## 2022-01-01 RX ADMIN — METOPROLOL TARTRATE 12.5 MG: 25 TABLET, FILM COATED ORAL at 18:04

## 2022-01-01 RX ADMIN — PANTOPRAZOLE SODIUM 40 MG: 40 TABLET, DELAYED RELEASE ORAL at 09:13

## 2022-01-01 RX ADMIN — HYDROMORPHONE HYDROCHLORIDE 0.2 MG: 1 INJECTION, SOLUTION INTRAMUSCULAR; INTRAVENOUS; SUBCUTANEOUS at 10:50

## 2022-01-01 RX ADMIN — HYDROMORPHONE HYDROCHLORIDE 0.2 MG: 1 INJECTION, SOLUTION INTRAMUSCULAR; INTRAVENOUS; SUBCUTANEOUS at 06:47

## 2022-01-01 RX ADMIN — HYDROMORPHONE HYDROCHLORIDE 0.2 MG: 1 INJECTION, SOLUTION INTRAMUSCULAR; INTRAVENOUS; SUBCUTANEOUS at 15:31

## 2022-01-01 RX ADMIN — DICLOFENAC SODIUM 4 G: 10 GEL TOPICAL at 03:36

## 2022-01-01 RX ADMIN — SODIUM CHLORIDE, PRESERVATIVE FREE 10 ML: 5 INJECTION INTRAVENOUS at 08:20

## 2022-01-01 RX ADMIN — SODIUM CHLORIDE, POTASSIUM CHLORIDE, SODIUM LACTATE AND CALCIUM CHLORIDE 100 ML/HR: 600; 310; 30; 20 INJECTION, SOLUTION INTRAVENOUS at 23:27

## 2022-01-01 RX ADMIN — DOCUSATE SODIUM 50MG AND SENNOSIDES 8.6MG 1 TABLET: 8.6; 5 TABLET, FILM COATED ORAL at 08:38

## 2022-01-01 RX ADMIN — METOPROLOL TARTRATE 12.5 MG: 25 TABLET, FILM COATED ORAL at 18:17

## 2022-01-01 RX ADMIN — GABAPENTIN 300 MG: 300 CAPSULE ORAL at 17:13

## 2022-01-01 RX ADMIN — CEFEPIME HYDROCHLORIDE 2 G: 2 INJECTION, POWDER, FOR SOLUTION INTRAVENOUS at 20:45

## 2022-01-01 RX ADMIN — AZITHROMYCIN MONOHYDRATE 500 MG: 500 INJECTION, POWDER, LYOPHILIZED, FOR SOLUTION INTRAVENOUS at 08:58

## 2022-01-01 RX ADMIN — GABAPENTIN 300 MG: 300 CAPSULE ORAL at 08:41

## 2022-01-01 RX ADMIN — SODIUM CHLORIDE 1 G: 9 INJECTION INTRAMUSCULAR; INTRAVENOUS; SUBCUTANEOUS at 23:26

## 2022-01-01 RX ADMIN — LORAZEPAM 0.5 MG: 2 INJECTION INTRAMUSCULAR; INTRAVENOUS at 16:40

## 2022-01-01 RX ADMIN — HYDROMORPHONE HYDROCHLORIDE 0.2 MG: 1 INJECTION, SOLUTION INTRAMUSCULAR; INTRAVENOUS; SUBCUTANEOUS at 04:24

## 2022-01-01 RX ADMIN — SODIUM CHLORIDE 1 G: 9 INJECTION INTRAMUSCULAR; INTRAVENOUS; SUBCUTANEOUS at 23:03

## 2022-01-01 RX ADMIN — ENOXAPARIN SODIUM 40 MG: 100 INJECTION SUBCUTANEOUS at 08:14

## 2022-01-01 RX ADMIN — PANTOPRAZOLE SODIUM 40 MG: 40 TABLET, DELAYED RELEASE ORAL at 08:38

## 2022-01-01 RX ADMIN — SODIUM CHLORIDE, PRESERVATIVE FREE 10 ML: 5 INJECTION INTRAVENOUS at 01:53

## 2022-01-01 RX ADMIN — SODIUM CHLORIDE, POTASSIUM CHLORIDE, SODIUM LACTATE AND CALCIUM CHLORIDE 100 ML/HR: 600; 310; 30; 20 INJECTION, SOLUTION INTRAVENOUS at 01:43

## 2022-01-01 RX ADMIN — LORAZEPAM 0.5 MG: 2 INJECTION INTRAMUSCULAR; INTRAVENOUS at 23:03

## 2022-01-01 RX ADMIN — ACETAMINOPHEN 650 MG: 325 TABLET ORAL at 09:11

## 2022-01-01 RX ADMIN — POTASSIUM BICARBONATE 20 MEQ: 782 TABLET, EFFERVESCENT ORAL at 18:04

## 2022-01-01 RX ADMIN — HYDROMORPHONE HYDROCHLORIDE 0.2 MG: 1 INJECTION, SOLUTION INTRAMUSCULAR; INTRAVENOUS; SUBCUTANEOUS at 20:58

## 2022-01-01 RX ADMIN — HYDROCODONE BITARTRATE AND ACETAMINOPHEN 0.5 TABLET: 5; 325 TABLET ORAL at 00:02

## 2022-01-01 RX ADMIN — SODIUM CHLORIDE, PRESERVATIVE FREE 10 ML: 5 INJECTION INTRAVENOUS at 01:43

## 2022-01-01 RX ADMIN — HYDROMORPHONE HYDROCHLORIDE 0.5 MG: 1 INJECTION, SOLUTION INTRAMUSCULAR; INTRAVENOUS; SUBCUTANEOUS at 07:28

## 2022-01-01 RX ADMIN — HYDROCODONE BITARTRATE AND ACETAMINOPHEN 0.5 TABLET: 5; 325 TABLET ORAL at 08:14

## 2022-01-01 RX ADMIN — CIPROFLOXACIN 250 MG: 250 TABLET, FILM COATED ORAL at 21:07

## 2022-01-01 RX ADMIN — SODIUM CHLORIDE, PRESERVATIVE FREE 10 ML: 5 INJECTION INTRAVENOUS at 23:28

## 2022-01-01 RX ADMIN — MORPHINE SULFATE 1 MG: 2 INJECTION, SOLUTION INTRAMUSCULAR; INTRAVENOUS at 03:36

## 2022-01-01 RX ADMIN — LORAZEPAM 0.5 MG: 2 INJECTION INTRAMUSCULAR; INTRAVENOUS at 03:36

## 2022-01-01 RX ADMIN — ACETAMINOPHEN 975 MG: 650 SUPPOSITORY RECTAL at 20:56

## 2022-01-01 RX ADMIN — FENTANYL CITRATE 25 MCG: 50 INJECTION, SOLUTION INTRAMUSCULAR; INTRAVENOUS at 13:50

## 2022-01-01 RX ADMIN — SODIUM CHLORIDE, PRESERVATIVE FREE 10 ML: 5 INJECTION INTRAVENOUS at 14:18

## 2022-01-31 NOTE — PROGRESS NOTES
HISTORY OF PRESENT ILLNESS  Modesta Reid is a 80 y.o. female. HPI  Comes in with daughter, Maddi Vences, who helps with history. Mom started on Saturday with bad pain, left side of face, over forehead, cheek and chin. No rash. Describes it as a sharp pain. Last night really could not sleep, 10/10 despite a Hydrocodone and Gabapentin 200 b.i.d. She does have a history of trigeminal neuralgia in the past.  She has also had a pituitary cyst.  She has chronic memory impairment, which has not changed significantly. Daughter with whom she lives has recently been diagnosed with metastatic lung cancer, increased stressors. Blood pressure on Saturday was in the 150/80 range. No chest pain, no falls, no fevers. Review of Systems   Constitutional: Negative for chills, fever and malaise/fatigue. Gastrointestinal: Negative for nausea and vomiting. Skin: Negative for itching and rash. Neurological: Positive for headaches. Negative for dizziness, tingling, sensory change, speech change, focal weakness and seizures. Physical Exam  Vitals and nursing note reviewed. Constitutional:       Appearance: She is well-developed. HENT:      Head: Normocephalic. Right Ear: Tympanic membrane, ear canal and external ear normal.      Left Ear: Tympanic membrane, ear canal and external ear normal.      Nose: Nose normal.      Mouth/Throat:      Pharynx: No oropharyngeal exudate. Eyes:      General:         Right eye: No discharge. Left eye: No discharge. Conjunctiva/sclera: Conjunctivae normal.      Pupils: Pupils are equal, round, and reactive to light. Cardiovascular:      Rate and Rhythm: Normal rate and regular rhythm. Heart sounds: Normal heart sounds. Pulmonary:      Effort: Pulmonary effort is normal. No respiratory distress. Breath sounds: Normal breath sounds. No wheezing or rales. Musculoskeletal:      Cervical back: Normal range of motion and neck supple.    Lymphadenopathy: Cervical: No cervical adenopathy. Skin:     Findings: No rash. Neurological:      General: No focal deficit present. Comments: Chronic memory impairment no acute change-speech is clear and she is interactive         ASSESSMENT and PLAN  Diagnoses and all orders for this visit:    1. Left facial pain-acute onset -given ho cyst need mri to be sure no mass effect on nerves  Distribution of pain c./w trigeminal neuralgia  Inc from 200 mg bid to 300 mg bid gabapentin for pain  Discussed with dt verna  -     MRI BRAIN W WO CONT; Future  -     gabapentin (NEURONTIN) 100 mg capsule; Take 3 Capsules by mouth two (2) times a day. Max Daily Amount: 600 mg.    2. Pituitary cyst (HCC)  -     MRI BRAIN W WO CONT; Future    3. HTN, goal below 140/80-controlled    4.  Memory loss or impairment

## 2022-02-02 NOTE — ED PROVIDER NOTES
80-year-old female history of anemia, back pain, hypertension, muscle weakness presents to the emergency department with her daughter for chief complaint of left face pain. This been going on for about 5 days. No rash. No fever. Initially thought may have been due to dental disease and was started on clindamycin which has not helped. Her primary care doctor saw her and felt that this may be trigeminal neuralgia and increased her gabapentin. Despite these treatments she continues to have significant pain the left side of her face, predominantly the forehead region but also down anterior to the ear. The history is provided by the patient, medical records and a relative. Other  This is a new problem. The current episode started more than 2 days ago. The problem occurs constantly. The problem has not changed since onset. Pertinent negatives include no chest pain, no abdominal pain, no headaches and no shortness of breath. Treatments tried: Antibiotics, gabapentin. The treatment provided no relief. Past Medical History:   Diagnosis Date    Anemia     Back pain     Bilateral hearing loss 1/29/2019    H/O: hysterectomy 1/29/2019    Hair loss     History of double vision     Hx of diarrhea     Hx of hemorrhoids     Hypertension     Incontinence of urine     Joint pain     Joint swelling     Memory loss or impairment 1/29/2019    Muscle ache     Muscle weakness     Pernicious anemia 1/28/2019    Pituitary cyst ([facility] [address] 1/29/2019    Resection [id number] and again in 2013 benign    PUD (peptic ulcer disease) 1/29/2019    Surgery for duodenum ?  Ulcer in 2009    Sleep difficulties     Stiffness in joint     Vision blurring        Past Surgical History:   Procedure Laterality Date    IR INJ SPINE FLUORO GUIDED  1/5/2021         Family History:   Problem Relation Age of Onset    Other Mother     Cancer Father     Heart Disease Father        Social History     Socioeconomic History    Marital status:      Spouse name: Not on file    Number of children: Not on file    Years of education: Not on file    Highest education level: Not on file   Occupational History    Not on file   Tobacco Use    Smoking status: Never Smoker    Smokeless tobacco: Never Used   Vaping Use    Vaping Use: Never used   Substance and Sexual Activity    Alcohol use: Yes     Comment: Rare    Drug use: No    Sexual activity: Never   Other Topics Concern    Not on file   Social History Narrative    Not on file     Social Determinants of Health     Financial Resource Strain:     Difficulty of Paying Living Expenses: Not on file   Food Insecurity:     Worried About Running Out of Food in the Last Year: Not on file    Evgeny of Food in the Last Year: Not on file   Transportation Needs:     Lack of Transportation (Medical): Not on file    Lack of Transportation (Non-Medical):  Not on file   Physical Activity:     Days of Exercise per Week: Not on file    Minutes of Exercise per Session: Not on file   Stress:     Feeling of Stress : Not on file   Social Connections:     Frequency of Communication with Friends and Family: Not on file    Frequency of Social Gatherings with Friends and Family: Not on file    Attends Synagogue Services: Not on file    Active Member of [address] [place] [address] [employer] or Organizations: Not on file    Attends Club or Organization Meetings: Not on file    Marital Status: Not on file   Intimate Partner Violence:     Fear of Current or Ex-Partner: Not on file    Emotionally Abused: Not on file    Physically Abused: Not on file    Sexually Abused: Not on file   Housing Stability:     Unable to Pay for Housing in the Last Year: Not on file    Number of [place] in the Last Year: Not on file    Unstable Housing in the Last Year: Not on file         ALLERGIES: Penicillins, Percocet [oxycodone-acetaminophen], and Sulfa (sulfonamide antibiotics)    Review of Systems   Constitutional: Negative for fatigue and fever. HENT: Negative for sneezing and sore throat. Respiratory: Negative for cough and shortness of breath. Cardiovascular: Negative for chest pain and leg swelling. Gastrointestinal: Negative for abdominal pain, diarrhea, nausea and vomiting. Genitourinary: Negative for difficulty urinating and dysuria. Musculoskeletal: Negative for arthralgias and myalgias. Skin: Negative for color change and rash. Neurological: Negative for weakness and headaches. Psychiatric/Behavioral: Negative for agitation and behavioral problems. There were no vitals filed for this visit. Physical Exam  Vitals and nursing note reviewed. Constitutional:       General: She is not in acute distress. Appearance: Normal appearance. She is well-developed. She is not ill-appearing, toxic-appearing or diaphoretic. HENT:      Head: Normocephalic and atraumatic. Comments: No significant tenderness to light touch or palpation of the left side of the face and trigeminal region     Nose: Nose normal.      Mouth/Throat:      Mouth: Mucous membranes are dry. Pharynx: Oropharynx is clear. Comments: Generalized poor dentition  Eyes:      Extraocular Movements: Extraocular movements intact. Conjunctiva/sclera: Conjunctivae normal.      Pupils: Pupils are equal, round, and reactive to light. Cardiovascular:      Rate and Rhythm: Normal rate and regular rhythm. Pulses: Normal pulses. Heart sounds: Normal heart sounds. Pulmonary:      Effort: Pulmonary effort is normal. No respiratory distress. Breath sounds: Normal breath sounds. No wheezing. Chest:      Chest wall: No tenderness. Abdominal:      General: Abdomen is flat. There is no distension. Palpations: Abdomen is soft. Tenderness: There is no abdominal tenderness. There is no guarding or rebound. Musculoskeletal:         General: No swelling, tenderness, deformity or signs of injury.  Normal range of motion. Cervical back: Normal range of motion and neck supple. No rigidity. No muscular tenderness. Right lower leg: No edema. Left lower leg: No edema. Skin:     General: Skin is warm and dry. Capillary Refill: Capillary refill takes less than 2 seconds. Neurological:      General: No focal deficit present. Mental Status: She is alert and oriented to person, place, and time. Psychiatric:         Mood and Affect: Mood normal.         Behavior: Behavior normal.          MDM  Number of Diagnoses or Management Options  Trigeminal neuralgia of left side of face  Diagnosis management comments: 35-year-old female presents as above with left-sided facial pain. Given the distribution trigeminal neuralgia is most likely. She has mildly elevated ESR but not CRP making GCA less likely. No significant findings on CT scan. Plan to treat with pain control, follow-up with primary care, return if needed. Patient's daughter indicates that patient has had trigeminal neuralgia in the past with good response to low-dose Valium. Will prescribe short course.        Amount and/or Complexity of Data Reviewed  Clinical lab tests: reviewed  Tests in the radiology section of CPT®: reviewed           Procedures

## 2022-02-02 NOTE — ED TRIAGE NOTES
Pt was wheeled to the treatment area accompanied by her daughter. Pt daughter states Geri Avila started having facial pain on the left side on Saturday. I called the dentist they started her on Clyndamycin. She went to the doctor on Monday they increased her Neurontin thinking it might be trigeminal neuralgia. They ordered an MRI but the insurance hasn't approved it yet. She took hydrocodone this morning but it hasn't helped. \" Dr Seven Parr in room.

## 2022-02-02 NOTE — ED NOTES
Pt was discharged and given instructions by Dr Heber Daigle . Pt and daughter verbalized good understanding of all discharge instructions,prescriptions and F/U care. All questions answered. Pt in stable condition on discharge. Pt wheeled out and assisted into car by ED Tech.

## 2022-02-04 PROBLEM — A41.9 SEPSIS (HCC): Status: ACTIVE | Noted: 2022-01-01

## 2022-02-04 NOTE — ED PROVIDER NOTES
Chief Complaint   Patient presents with    Facial Pain     Limited history and review of systems due to dementia. 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 She is not able to provide any history and cannot tell me whether she is experiencing changes in her vision, headache, neck pain, or any other symptoms. Family has been administering hydrocodone at home without relief, tried administering Valium additionally later this afternoon but she continues to report pain across each side of her face. Was scheduled for an MRI brain by her neurologist which was declined by her insurance earlier this week. Further, she has not been able to walk today and has not had much to eat or drink. Past Medical History:   Diagnosis Date    Anemia     Back pain     Bilateral hearing loss 1/29/2019    H/O: hysterectomy 1/29/2019    Hair loss     History of double vision     Hx of diarrhea     Hx of hemorrhoids     Hypertension     Incontinence of urine     Joint pain     Joint swelling     Memory loss or impairment 1/29/2019    Muscle ache     Muscle weakness     Pernicious anemia 1/28/2019    Pituitary cyst ([facility] [address] 1/29/2019    Resection [id number] and again in 2013 benign    PUD (peptic ulcer disease) 1/29/2019    Surgery for duodenum ?  Ulcer in 2009    Sleep difficulties     Stiffness in joint     Vision blurring        Past Surgical History:   Procedure Laterality Date    IR INJ SPINE FLUORO GUIDED  1/5/2021         Family History:   Problem Relation Age of Onset    Other Mother     Cancer Father     Heart Disease Father        Social History     Socioeconomic History    Marital status:      Spouse name: Not on file    Number of children: Not on file    Years of education: Not on file    Highest education level: Not on file   Occupational History    Not on file   Tobacco Use    Smoking status: Never Smoker    Smokeless tobacco: Never Used   Vaping Use    Vaping Use: Never used   Substance and Sexual Activity    Alcohol use: Yes     Comment: Rare    Drug use: No    Sexual activity: Never   Other Topics Concern    Not on file   Social History Narrative    Not on file     Social Determinants of Health     Financial Resource Strain:     Difficulty of Paying Living Expenses: Not on file   Food Insecurity:     Worried About Running Out of Food in the Last Year: Not on file    Evgeny of Food in the Last Year: Not on file   Transportation Needs:     Lack of Transportation (Medical): Not on file    Lack of Transportation (Non-Medical):  Not on file   Physical Activity:     Days of Exercise per Week: Not on file    Minutes of Exercise per Session: Not on file   Stress:     Feeling of Stress : Not on file   Social Connections:     Frequency of Communication with Friends and Family: Not on file    Frequency of Social Gatherings with Friends and Family: Not on file    Attends Jainism Services: Not on file    Active Member of [address] [place] [address] or Organizations: Not on file    Attends Club or Organization Meetings: Not on file    Marital Status: Not on file   Intimate Partner Violence:     Fear of Current or Ex-Partner: Not on file    Emotionally Abused: Not on file    Physically Abused: Not on file    Sexually Abused: Not on file   Housing Stability:     Unable to Pay for Housing in the Last Year: Not on file    Number of [place] in the Last Year: Not on file    Unstable Housing in the Last Year: Not on file         ALLERGIES: Penicillins, Percocet [oxycodone-acetaminophen], and Sulfa (sulfonamide antibiotics)    Review of Systems   Unable to perform ROS: Dementia       Vitals:    02/04/22 1246   BP: (!) 156/89   Pulse: 90   Resp: 18   Temp: 98.2 °F (36.8 °C) SpO2: 97%   Weight: 54.9 kg (121 lb)   Height: 5' 3\" (1.6 m)            Physical Exam  General:  Awake and alert, NAD  HEENT:  NC/AT, equal pupils, moist mucous membranes, TM's clear without bulging  Neck:   Normal inspection, full range of motion  Cardiac:  RRR, no murmurs  Respiratory:  Clear bilaterally, no wheezes, rales, rhonchi  Abdomen:  Soft and nontender, nondistended  Extremities: Warm and well perfused, no peripheral edema  Neuro: Follows basic commands, not oriented or able to provide any history, moving all extremities purposefully  Skin:   No rashes or pallor    RESULTS  No results found for this or any previous visit (from the past 12 hour(s)). IMAGING  No results found. Procedures - none unless documented below    ED course: Labs and imaging pending, patient signed out to oncoming KENNEDY Singh).     Impression: Facial numbness  Disposition: Pending

## 2022-02-04 NOTE — ED TRIAGE NOTES
Pt presents with severe left sided facial pain that started 6 days ago. Seen at [facility] 4 days ago and had CT scan and bloodwork, sent home with hydrocodone, tylenol and increased gabapentin dosing to TID. Pt pain is progressively worse. PMHx of trigeminal neuralgia with same sx. Pt with h/o pituitary cyst which Dr. Pooja Dooley ordered an MRI, but insurance denied it. Last received hydrocodone at 0200 and 0800 and received diazapam 2.5mg PO at 0300.

## 2022-02-05 NOTE — PROGRESS NOTES
Spiritual Care Assessment/Progress Note  [address]      NAME: Mark Hill      MRN: [id number]  AGE: 80 y.o. SEX: female  [employer]ist Affiliation: Buddhist   Language: English     2/5/2022     Total Time (in minutes): 36     Spiritual Assessment begun in [facility]  MED SURG 2 through conversation with:         []Patient        [x] Family    [] Friend(s)        Reason for Consult: Request by staff     Spiritual beliefs: (Please include comment if needed)     [] Identifies with a denilson tradition:         [] Supported by a denilson community:            [] Claims no spiritual orientation:           [] Seeking spiritual identity:                [] Adheres to an individual form of spirituality:           [x] Not able to assess:                           Identified resources for coping:      [] Prayer                               [] Music                  [] Guided Imagery     [x] Family/friends                 [] Pet visits     [] Devotional reading                         [] Unknown     [] Other:                                               Interventions offered during this visit: (See comments for more details)          Family/Friend(s):  Affirmation of emotions/emotional suffering,Catharsis/review of pertinent events in supportive environment,End of life issues discussed,Iconic (affirming the presence of God/Higher Power)     Plan of Care:     [] Support spiritual and/or cultural needs    [] Support AMD and/or advance care planning process      [] Support grieving process   [] Coordinate Rites and/or Rituals    [] Coordination with community clergy   [] No spiritual needs identified at this time   [] Detailed Plan of Care below (See Comments)  [] Make referral to Music Therapy  [] Make referral to Pet Therapy     [] Make referral to Addiction services  [] Make referral to [facility]  [] Make referral to Spiritual Care Partner  [] No future visits requested        [x] Follow up visits as needed Visited pt in response to a request by pt's daughter Krystal Kamara. Krystal Kamara wishes to employ a DDNR for her mother. The pt has an AMD in her chart naming Phyllis as MPOA. Accompanied Phyllis to pt's nurse to request she have a doctor complete the DDNR for the patient.   Chaplain Diehl MDiv, MS, [facility]

## 2022-02-05 NOTE — ED NOTES
TRANSFER - OUT REPORT:    Verbal report given to 85 Sai Cohen RN(name) on Julissa Prime  being transferred to 6(unit) for routine progression of care       Report consisted of patients Situation, Background, Assessment and   Recommendations(SBAR). Information from the following report(s) SBAR, Kardex, ED Summary, Intake/Output, MAR and Recent Results was reviewed with the receiving nurse. Lines:   Peripheral IV 02/04/22 Left Forearm (Active)       Peripheral IV 02/04/22 (Active)        Opportunity for questions and clarification was provided.

## 2022-02-05 NOTE — PROGRESS NOTES
Patient newly arrived to [facility] [phone] just after 0130. Nonverbal, and did not indicate to this author awareness of patient's own name. Quiet, laying still in bed with eyes closed; open to sound and touch. Pursed lips slightly during this author's attempt to perform mouth care. Wound care consult as indicated. Patient with generalized hyperpigmented raised brown skin tags. Mouth appears also to have poor dentition and dark brown coloration also to stained teeth. No noted visible c/o pain or discomfort except patient not opening mouth widely during author's attempt to perform mouth care. Belongings seen: lavendar floral dress, a pair of white socks, a tan blanket seen in use on bed atop patient's hospital bed linens. Purewick urinary suction device placed.

## 2022-02-05 NOTE — PROGRESS NOTES
Admission Medication Reconciliation:     Information obtained from:    Child via interview in  [address]:  YES    Comments/Recommendations:   Patient's daughter is able to confirm name, , allergies, and preferred pharmacy  Updated PTA medication list  The patient took clindamycin 300 mg four times daily from last  to yesterday. It was stopped prior to admission yesterday by the dentist.       [facility] pharmacy benefit data reflects medications filled and processed through the patient's insurance, however   this data does NOT capture whether the medication was picked up or is currently being taken by the patient. Prior to Admission Medications   Prescriptions Last Dose Informant Taking? HYDROcodone-acetaminophen (NORCO) 5-325 mg per tablet 2022 at 8am Child Yes   Sig: Take 0.5-1 Tablets by mouth every six (6) hours as needed for Pain. L. acidophilus/pectin, citrus (ACIDOPHILUS PROBIOTIC PO) 2/3/2022 at Unknown time Child Yes   Sig: Take 1 Capsule by mouth daily. acetaminophen (TYLENOL) 500 mg tablet 2022 at Unknown time Child Yes   Sig: Take 1,000 mg by mouth every eight (8) hours as needed for Pain. amLODIPine (NORVASC) 10 mg tablet 2/3/2022 at Unknown time Child Yes   Sig: TAKE 1 TABLET BY MOUTH EVERY DAY   aspirin delayed-release 81 mg tablet 2/3/2022 at Unknown time Child Yes   Sig: Take 81 mg by mouth daily. clobetasol (TEMOVATE) 0.05 % external solution  Child Yes   Sig: Apply  to affected area daily as needed (itchy scalp). cyanocobalamin (VITAMIN B12) 1,000 mcg/mL injection 2022 at Unknown time Child Yes   Si mL by IntraMUSCular route every month. cyclobenzaprine (FLEXERIL) 5 mg tablet  Child Yes   Sig: Take 2.5 mg by mouth nightly as needed for Muscle Spasm(s). diazePAM (Valium) 2 mg tablet 2022 at 3am Child Yes   Sig: Take 1 Tablet by mouth three (3) times daily as needed for Anxiety (spasm).  Max Daily Amount: 6 mg.   diclofenac (VOLTAREN) 1 % gel 2/3/2022 at Unknown time Child Yes   Sig: Apply 4 g to affected area four (4) times daily as needed for Pain. docusate sodium (Colace) 100 mg capsule 2022 at Unknown time Child Yes   Sig: Take 1 Capsule by mouth daily for 20 days. Take daily while taking Norco.   gabapentin (NEURONTIN) 100 mg capsule 2022 at Unknown time Child Yes   Sig: Take 3 Capsules by mouth two (2) times a day. Max Daily Amount: 600 mg.   lidocaine (Lidoderm) 5 %  Child Yes   Si Patch by TransDERmal route daily as needed for Pain. Apply patch to the affected area for 12 hours a day and remove for 12 hours a day. memantine HCl (NAMENDA XR PO) 2022 at Unknown time Child Yes   Sig: Take 21 mg by mouth nightly. metoprolol tartrate (LOPRESSOR) 25 mg tablet 2/3/2022 at Unknown time Child Yes   Sig: Take 0.5 Tablets by mouth two (2) times a day. olmesartan (BENICAR) 40 mg tablet 2/3/2022 at Unknown time Child Yes   Sig: TAKE 1 TABLET BY MOUTH EVERY DAY   omeprazole (PRILOSEC) 20 mg capsule 2/3/2022 at Unknown time Child Yes   Sig: TAKE 1 CAPSULE BY MOUTH EVERY DAY      Facility-Administered Medications: None         Please contact the main inpatient pharmacy with any questions or concerns at [phone] and we will direct you to the clinical pharmacist covering this patient's care while in-house.    Edy Uribe, PharmD, BCPS

## 2022-02-05 NOTE — PROGRESS NOTES
VERBAL_RECORDED_WRITTEN:70503::\"Bedside\"} shift change report given to Pauly (oncoming nurse) by Kaylah Rubin (offgoing nurse). Report included the following information SBAR, Kardex, ED Summary, Procedure Summary, Intake/Output, MAR, Recent Results and Med Rec Status; monitor BP; Dr. Franki Arndt reported plan to order prn BP meds (none seen at 0620); purewick; dysphagia screen not performed since ER nurse reported patient pockets meds and water, and is aspiration risk. Must call family/alternate source for home med list completion. Kari Samayoa

## 2022-02-05 NOTE — PROGRESS NOTES
Author sees no lab orders for this morning, at this time.  However, lab draw for today assigned to day shift phlebotomist.

## 2022-02-05 NOTE — PROGRESS NOTES
Problem: Dysphagia (Adult)  Goal: *Acute Goals and Plan of Care (Insert Text)  Description: Speech pathology goals  Initiated 2/5/2022  1. Patient will tolerate puree/thin liquid diet with no adverse effects within 7 days  2. Patient will tolerate trials of solids with timely/complete mastication and full oral clearance within 7 days  Outcome: Progressing Towards Goal     SPEECH LANGUAGE PATHOLOGY BEDSIDE SWALLOW EVALUATION  Patient: Ian Hughes (35 y.o. female)  Date: 2/5/2022  Primary Diagnosis: Sepsis ([facility] [address] [A41.9]        Precautions: aspiration       ASSESSMENT :  Note SLP consulted due to patient failing the STAND. Patient with dementia at baseline, however daughter reported she is significantly more confused at this time. Patient with hearing loss and jargon when speaking, which daughter reported is also new. Based on the objective data described below, the patient presents with moderate oral and mild-moderate pharyngeal dysphagia characterized by slow oral prep, intermittent oral holding with delayed posterior propulsion, and throat clearing with thin liquids when patient orally holds. No additional s/s aspiration observed, and throat clearing with thin liquids is not unexpected given patient's advanced age. Suspect swallow function will mirror mental status. Patient will benefit from skilled intervention to address the above impairments. Patients rehabilitation potential is considered to be Fair     PLAN :  Recommendations and Planned Interventions:  -Puree/thin liquid diet, small/single sips (which patient took independently), no straws, supervision with PO intake, limit distractions with PO intake, meds crushed in puree  -SLP to follow for diet tolerance and liberalization as mental status improves  Frequency/Duration: Patient will be followed by speech-language pathology 2 times a week to address goals. Discharge Recommendations:  To Be Determined     SUBJECTIVE:   Patient stated I see you.    OBJECTIVE:     Past Medical History:   Diagnosis Date    Anemia     Back pain     Bilateral hearing loss 1/29/2019    H/O: hysterectomy 1/29/2019    Hair loss     History of double vision     Hx of diarrhea     Hx of hemorrhoids     Hypertension     Incontinence of urine     Joint pain     Joint swelling     Memory loss or impairment 1/29/2019    Muscle ache     Muscle weakness     Pernicious anemia 1/28/2019    Pituitary cyst ([address] 1/29/2019    Resection [id number] and again in 2013 benign    PUD (peptic ulcer disease) 1/29/2019    Surgery for duodenum ? Ulcer in 2009    Sleep difficulties     Stiffness in joint     Vision blurring      Past Surgical History:   Procedure Laterality Date    IR INJ SPINE FLUORO GUIDED  1/5/2021     Prior Level of Function/Home Situation:      Diet prior to admission: no dysphagia at baseline per daughter  Current Diet:  NPO   Cognitive and Communication Status:  Neurologic State: Alert,Confused  Orientation Level: Unable to verbalize  Cognition: Decreased attention/concentration,Decreased command following          P.O. Trials:  Patient Position: upright in bed  Vocal quality prior to P.O.: No impairment (jargon)  Consistency Presented: Ice chips; Thin liquid;Puree  How Presented: Self-fed/presented;Cup/sip;SLP-fed/presented;Spoon     Bolus Acceptance: No impairment  Bolus Formation/Control: Impaired  Type of Impairment: Delayed; Other (comment) (oral holding)  Propulsion: Delayed (# of seconds)  Oral Residue: None  Initiation of Swallow: Delayed (# of seconds)  Laryngeal Elevation: Functional  Aspiration Signs/Symptoms: Clear throat; Other (comment) (when oral holding)                Oral Phase Severity: Moderate  Pharyngeal Phase Severity : Mild-moderate    NOMS:   The NOMS functional outcome measure was used to quantify this patient's level of swallowing impairment.   Based on the NOMS, the patient was determined to be at level 3 for swallow function NOMS Swallowing Levels:  Level 1 (CN): NPO  Level 2 (CM): NPO but takes consistency in therapy  Level 3 (CL): Takes less than 50% of nutrition p.o. and continues with nonoral feedings; and/or safe with mod cues; and/or max diet restriction  Level 4 (CK): Safe swallow but needs mod cues; and/or mod diet restriction; and/or still requires some nonoral feeding/supplements  Level 5 (CJ): Safe swallow with min diet restriction; and/or needs min cues  Level 6 (CI): Independent with p.o.; rare cues; usually self cues; may need to avoid some foods or needs extra time  Level 7 ([address] [place] [address]): Independent for all p.o.  LUCIO. (2003). National Outcomes Measurement System (NOMS): Adult Speech-Language Pathology User's Guide. Pain:             After treatment:   Patient left in no apparent distress in bed, Call bell within reach and Nursing notified    COMMUNICATION/EDUCATION:   Patient was educated regarding her deficit(s) of dysphagia as this relates to her diagnosis of AMS. She demonstrated Guarded understanding as evidenced by AMS. Daughter verbalized understanding. The patient's plan of care including recommendations, planned interventions, and recommended diet changes were discussed with: Registered nurse. Patient/family have participated as able in goal setting and plan of care. Patient/family agree to work toward stated goals and plan of care.     Thank you for this referral.  EDER White  Time Calculation: 14 mins

## 2022-02-05 NOTE — ED NOTES
3 PM  Change of shift. Care of patient taken over from Dr. Vilma Velarde; H&P reviewed, bedside handoff complete. Awaiting MRI and labs. MRI shows a sellar lesion. Patient has known pituitary lesion which has been operated on in the past.  White blood cell count returned elevated to 16,000. Rectal temperature 101.1. Sepsis work-up initiated. Patient will be started on broad-spectrum antibiotics and a Covid test will be obtained. Patient will be admitted to hospital medicine for further management    Code Sepsis Reassessment & Plan    - Sepsis order set entered: YES  - Broad Spectrum Antibiotics given: see orders  - Repeat lactic acid ordered for time see orders  - Re-assessment performed at time 800pm and clinical condition stable. - Actions taken: see orders. - Hypotension or Lactic Acidosis present (SBP<90, MAP<65, Lactate >4): NO   - IVF: see orders  - Persistent Septic Shock present (Hypotension despite IVF resuscitation): NO  Vasopressors: no  - Disposition: admit to tele      Perfect Serve Consult for Admission  8:49 PM    ED Room Number: ER06/06  Patient Name and age:  Jeann[age] Estevez y.o.  female  Working Diagnosis:   1. Sepsis, due to unspecified organism, unspecified whether acute organ dysfunction present ([address]    2. Facial numbness        COVID-19 Suspicion:  yes  Sepsis present:  yes  Reassessment needed: no  Code Status:  Full Code  Readmission: no  Isolation Requirements:  yes  Recommended Level of Care:  telemetry  Department:[facility] ED - [phone]  Other:  covid pending. No source yet.  Cath urine pending

## 2022-02-05 NOTE — ACP (ADVANCE CARE PLANNING)
[address] Adult  Hospitalist Group                                      Advance Care Planning Note    Name: Indira Bae  YOB: 1926  MRN: [id number]  Admission Date: 2/4/2022 12:48 PM    Date of discussion: 2/5/2022    Active Diagnoses:    Hospital Problems  Date Reviewed: 1/31/2022          Codes Class Noted POA    Sepsis [facility]) ICD-10-CM: A41.9  ICD-9-CM: 038.9, 995.91  2/4/2022 Unknown              These active diagnoses are of sufficient risk that focused discussion on advance care planning is indicated in order to allow the patient to thoughtfully consider personal goals of care, and if situations arise that prevent the ability to personally give input, to ensure appropriate representation of their personal desires for different levels and aggressiveness of care. Discussion:     Persons present and participating in discussion: Andrew Hall MD, Jamie Memos    Topics Discussed:  Patient's medical condition and diagnosis: [  x] yes [  ] no   Surrogate decision maker: [x  ] yes [  ] no   Patient's current physical function/cognitive function/frailty: [  x] yes [  ] no   Code Status: [x  ] yes [  ] no   Artificial Nutrition / Dialysis / Non-Invasive Ventilation / Blood Transfusion: [ x ] yes [  ] no  Potential Resources for home (durable medical equipment, home nursing, home O2): [ x ] yes [  ] no    Overview of Discussion: Cindy and her sser, [facility], have been very involved in patients care and do recently have advance directive, which we review together. Confirm her DNR status, but that note that they would like to complete DDNR while she is here. She doesn't think she is quite ready for hospice, but open to the concept based on how she does this hospitalization. Time Spent:     Total time spent face-to-face in education and discussion: 16 minutes.      Magdalena La MD  Date of Service:  2/5/2022  1:42 AM

## 2022-02-05 NOTE — PROGRESS NOTES
Ronald Pearson [facility] 79  [address] [facility], [place], [address] [place] [address]  [phone]      Medical Progress Note      NAME: Modesta Reid   :  1926  MRM:  [id number]    Date/Time of service: 2022         Subjective:     Chief Complaint:  Patient was personally seen and examined by me during this time period. Chart reviewed. F/u sepsis. Unable to obtain ROS due to encephalopathy/dementia. Objective:       Vitals:       Last 24hrs VS reviewed since prior progress note.  Most recent are:    Visit Vitals  BP (!) 155/84 (BP 1 Location: Right arm, BP Patient Position: At rest;Lying)   Pulse 92   Temp 98.8 °F (37.1 °C)   Resp 18   Ht 5' 3\" (1.6 m)   Wt 54.9 kg (121 lb)   SpO2 97%   BMI 21.43 kg/m²     SpO2 Readings from Last 6 Encounters:   22 97%   22 96%   22 94%   21 94%   21 95%   21 97%            Intake/Output Summary (Last 24 hours) at 2022 7103  Last data filed at 2022 0700  Gross per 24 hour   Intake 2775 ml   Output 725 ml   Net 2050 ml        Exam:     Physical Exam:    Gen:    Frail elderly, nad  HEENT:  Pink conjunctivae, PERRL, hearing intact to voice  Neck:  Supple, without masses, thyroid non-tender  Resp:  No accessory muscle use  Card:   No murmurs, normal S1, S2 without thrills, bruits or peripheral edema  Abd:  Soft, non-tender, non-distended, normoactive bowel sounds are present  Musc:  No cyanosis or clubbing  Neuro: no gross deformities, not following commands   Psych:  calm      Medications Reviewed: (see below)    Lab Data Reviewed: (see below)    ______________________________________________________________________    Medications:     Current Facility-Administered Medications   Medication Dose Route Frequency    amLODIPine (NORVASC) tablet 10 mg  10 mg Oral DAILY    sodium chloride (NS) flush 5-10 mL  5-10 mL IntraVENous PRN    sodium chloride (NS) flush 5-40 mL  5-40 mL IntraVENous Q8H    sodium chloride (NS) flush 5-40 mL  5-40 mL IntraVENous PRN    polyethylene glycol (MIRALAX) packet 17 g  17 g Oral DAILY PRN    ondansetron (ZOFRAN ODT) tablet 4 mg  4 mg Oral Q8H PRN    Or    ondansetron (ZOFRAN) injection 4 mg  4 mg IntraVENous Q6H PRN    enoxaparin (LOVENOX) injection 40 mg  40 mg SubCUTAneous DAILY    cefTRIAXone (ROCEPHIN) 1 g in 0.9% sodium chloride 10 mL IV syringe  1 g IntraVENous Q24H    lactated Ringers infusion  100 mL/hr IntraVENous CONTINUOUS          Lab Review:     Recent Labs     02/04/22  1717 02/02/22  1201   WBC 16.2* 6.4   HGB 9.8* 9.8*   HCT 31.6* 32.4*    334     Recent Labs     02/04/22  1717   *   K 4.5   CL 99   CO2 22   *   BUN 10   CREA 0.80   CA 8.9   ALB 3.3*   TBILI 0.3   ALT 15     Lab Results   Component Value Date/Time    Glucose (POC) 96 02/02/2022 12:01 PM          Assessment / Plan:     Sepsis: Fever, tachycardia, leukocytosis and likely UTI. CXR w/ concern for atypical PNA.; but RVP neg. Follow blood and urine cx. CTX 2/4 -      Acute metabolic encephalopathy: baseline mild dementia. Apparently fairly high functioning and this is a rapid change. Suspect due to the above. Family in process of completing DDNR and they have advance directive.     Hypovolemic hyponatremia: IVVD; continue IVF. Monitor      Trigeminal Neuralgia: Hold gabapentin for now given AMS, but less likely this is culprit therein     Pituitary Cyst: Appreciated on MRI and chronic; doubt contributing     HTN: Currently elevated. Resume amlodipine amd metop tar; hold ARB for now.      Total time spent with patient: 32 Minutes **I personally saw and examined the patient during this time period**                 Care Plan discussed with: Patient and Nursing Staff    Discussed:  Care Plan    Prophylaxis:  Lovenox    Disposition:  Home w/Family           ___________________________________________________    Attending Physician: Dayan Howe DO

## 2022-02-05 NOTE — PROGRESS NOTES
Most recent vs: temp 97.4, HR 84, RR 18, rm air pulse ox 94%, /95, ;  167/90, ;  164/83, ; no indication of pain, dizziness, nausea, or trouble breathing. Patient was initially nonverbal even to author's verbal and tactile stimuli. Patient has since requested hearing aids (none seen with patient's belongings), but also has wandering ideations/confusion; and has a mix up of words. No prn BP meds seen at this time. IVF LR at 100cc/h; urine output 500cc purewick suction device; but ER nurse reported that patient had large BM and voided in BSC this morning before patient's arrival to [facility].

## 2022-02-05 NOTE — PROGRESS NOTES
5018 perfectserve message from oncall Dr. Ting Gómez, to this author, in response to urgent perfectserve message sent to Dr. Ting Gómez (see 7108 progress notes by this author): For now will monitor bp.  I will order PRN bp meds

## 2022-02-05 NOTE — PROGRESS NOTES
Urgent perfectserve message sent to oncall Dr. Batsheva Gonzalez of copy of this author's 0934 nursing  progress notes, prefaced by the following note: BP elevation concern. DNR patient, not on remote tele. Sepsis workup in ER. Awaiting call back from Dr. Batsheva Gonzalez.

## 2022-02-05 NOTE — H&P
Hospitalist Admission Note    NAME: Virginia Frank   :  1926   MRN:  [id number]     Date/Time:  2022 11:58 PM    Patient PCP: Hernesto Gamble MD  ________________________________________________________________________    Given the patient's current clinical presentation, I have a high level of concern for decompensation if discharged from the emergency department. Complex decision making was performed, which includes reviewing the patient's available past medical records, laboratory results, and x-ray films. My assessment of this patient's clinical condition and my plan of care is as follows. Assessment / Plan:    #Sepsis: Fever, tachycardia, leukocytosis and likely UTI. Rapid SARS negative, but awaiting PCR given CR with possible basilar consolidations. - CTX  -    - F/u UCx, BCx   - F/u resp PCR   - Continue to monitor fluid responsiveness   - UOP and MAPs to goal    #Acute metabolic encephalopathy: On baseline mild dementia. She is apparently fairly high functioning and this is a rapid change. Suspect due to the above. Daughter and family in process of completing DDNR and they have advance directive in hand. Discussed that she may turn around form this infection, but possible she may not and that it may be time to consider hospice/palliative services. Will know in next 24-48 hrs    #Hypovolemic hyponatremia: Clinically down   - IVF    #Trigeminal Neuralgia:    - Hold gabapentin for now given confusion, but less likely this is culprit therein    #Pituitary Cyst: Appreciated on MRI and chronic; doubt contributing    #HTN: Home regimen I samlodipine 10mg, olmesartan 40mg, metop tar 12.5mg   - Hold for now in s/o above, potentially resume in AM            I have personally reviewed the radiographs, laboratory data in Epic and decisions and statements above are based partially on this personal interpretation.     Code Status: DNR/DNI  DVT Prophylaxis: Lovenox  GI Prophylaxis: not indicated       Subjective:   CHIEF COMPLAINT: \"confusion\"    HISTORY OF PRESENT ILLNESS:     Kartik Bowens is a 80 y.o. F hx dementia, pituitary lesion, HTN, and possible trigeminal neuralgia, who presents with increasing confusion. Daughter is present at bedside and notes that for last 3 nights pt has been confused and complaining of L sided facial pain. She saw her PCP several times and has ilene on gabapentin for presumed trigem neuralgia. She did also see dentist due to concern for tooth infection, but this eval was negative. In ER, she is found to have sepsis with UTI. Prior to UA results, she empirically received vanc, cefepime. Past Medical History:   Diagnosis Date    Anemia     Back pain     Bilateral hearing loss 1/29/2019    H/O: hysterectomy 1/29/2019    Hair loss     History of double vision     Hx of diarrhea     Hx of hemorrhoids     Hypertension     Incontinence of urine     Joint pain     Joint swelling     Memory loss or impairment 1/29/2019    Muscle ache     Muscle weakness     Pernicious anemia 1/28/2019    Pituitary cyst ([address] 1/29/2019    Resection [id number] and again in 2013 benign    PUD (peptic ulcer disease) 1/29/2019    Surgery for duodenum ? Ulcer in 2009    Sleep difficulties     Stiffness in joint     Vision blurring       Past Surgical History:   Procedure Laterality Date    IR INJ SPINE FLUORO GUIDED  1/5/2021     Social History     Tobacco Use    Smoking status: Never Smoker    Smokeless tobacco: Never Used   Substance Use Topics    Alcohol use: Yes     Comment: Rare      Family History   Problem Relation Age of Onset    Other Mother     Cancer Father     Heart Disease Father         Allergies   Allergen Reactions    Penicillins Hives    Percocet [Oxycodone-Acetaminophen] Other (comments)     Agitation    Sulfa (Sulfonamide Antibiotics) Hives        Prior to Admission medications    Medication Sig Start Date End Date Taking?  Authorizing Provider docusate sodium (Colace) 100 mg capsule Take 1 Capsule by mouth daily for 20 days. Take daily while taking Norco. 2/2/22 2/22/22  Elo Orozco MD   HYDROcodone-acetaminophen (Norco) 5-325 mg per tablet Take 1 Tablet by mouth every six (6) hours as needed for Pain for up to 3 days. Max Daily Amount: 4 Tablets. 2/2/22 2/5/22  Elo Orozco MD   diazePAM (Valium) 2 mg tablet Take 1 Tablet by mouth three (3) times daily as needed for Anxiety (spasm). Max Daily Amount: 6 mg. 2/2/22   Elo Orozco MD   clindamycin (CLEOCIN) 300 mg capsule Take 300 mg by mouth four (4) times daily. Provider, Historical   gabapentin (NEURONTIN) 100 mg capsule Take 3 Capsules by mouth two (2) times a day. Max Daily Amount: 600 mg. 1/31/22   Javier Wilkinson MD   amLODIPine (NORVASC) 10 mg tablet TAKE 1 TABLET BY MOUTH EVERY DAY 1/26/22   Javier Wilkinson MD   olmesartan (BENICAR) 40 mg tablet TAKE 1 TABLET BY MOUTH EVERY DAY 12/28/21   Javier Wilkinson MD   omeprazole (PRILOSEC) 20 mg capsule TAKE 1 CAPSULE BY MOUTH EVERY DAY 12/28/21   Javier Wilkinson MD   terconazole (TERAZOL 7) 0.4 % vaginal cream Insert 1 Applicator into vagina nightly. 11/4/21   Javier Wilkinson MD   cyanocobalamin (VITAMIN B12) 1,000 mcg/mL injection 1 mL by IntraMUSCular route every month. 10/5/21   Javier Wilkinson MD   ketoconazole (NIZORAL) 2 % topical cream Apply  to affected area two (2) times a day. X 2 weeks 9/24/21   Chapincito Rich NP   memantine ER (NAMENDA XR) 21 mg capsule TAKE 1 CAPSULE DAILY 9/10/21   Javier Wilkinson MD   HYDROcodone-acetaminophen [facility]) 5-325 mg per tablet Take  by mouth. Provider, Historical   metoprolol tartrate (LOPRESSOR) 25 mg tablet Take 0.5 Tablets by mouth two (2) times a day. 9/9/21   Javier Wilkinson MD   acetaminophen (TYLENOL) 500 mg tablet Take 1,000 mg by mouth every six (6) hours as needed for Pain.  8/9/21   Javier Wilkinson MD   diclofenac (VOLTAREN) 1 % gel Apply 4 g to affected area four (4) times daily as needed for Pain. 8/9/21   Claudio Valencia MD   L. acidophilus/pectin, citrus (ACIDOPHILUS PROBIOTIC PO) Take 1 Capsule by mouth daily. 8/9/21   Claudio Valencia MD   acetaminophen (TYLENOL) 325 mg tablet Take 325 mg by mouth every four (4) hours as needed for Pain. Other, MD Jeffery   lidocaine (Lidoderm) 5 % Apply patch to the affected area for 12 hours a day and remove for 12 hours a day. Patient taking differently: as needed for Pain. Apply patch to the affected area on thoracic spine for 12 hours a day and remove for 12 hours a day. 11/20/20   Isaiah Grajeda MD   clobetasol (TEMOVATE) 0.05 % external solution APPLY SOLUTION TWO TIMES DAILY TO ITCHY SCALP AS NEEDED 2/19/19   Provider, Historical   aspirin delayed-release 81 mg tablet Take 81 mg by mouth daily. Provider, Historical     REVIEW OF SYSTEMS:  See HPI for details    Patient was not able to provide review of systems due to mental status change/acute illness      Objective:   VITALS:    Visit Vitals  /65   Pulse 84   Temp 97.6 °F (36.4 °C)   Resp 17   Ht 5' 3\" (1.6 m)   Wt 54.9 kg (121 lb)   SpO2 98%   BMI 21.43 kg/m²     PHYSICAL EXAM:    Physical Exam:    Gen: Frail elderly, nad  HEENT:  Pink conjunctivae, PERRL, hearing intact to voice, moist mucous membranes  Neck: Supple, without masses, thyroid non-tender  Resp: No accessory muscle use  Card: No murmurs, normal S1, S2 without thrills, bruits or peripheral edema  Abd:  Soft, non-tender, non-distended, normoactive bowel sounds are present, no palpable organomegaly and no detectable hernias  Lymph:  No cervical or inguinal adenopathy  Musc: No cyanosis or clubbing  Skin: No rashes or ulcers, skin turgor is good  Neuro:  resting  Psych:  Resting           _______________________________________________________________________  Care Plan discussed with:    Comments   Patient x Discussed with patient in room.  POC outlined and Questions answered    Family      RN x    Care Manager                    Consultant:  rodrick GABRIEL MD   _______________________________________________________________________  Recommended Disposition:   Home with Family x   HH/PT/OT/RN    SNF/LTC    MICHELE    ________________________________________________________________________  TOTAL TIME:  60 Minutes        Comments   >50% of visit spent in counseling and coordination of care  Chart reviewed  Discussion with patient and/or family and questions answered     ________________________________________________________________________  Signed: Jd Granados MD    This note will not be viewable in [address] Procedures: see electronic medical records for all procedures/Xrays and details which were not copied into this note but were reviewed prior to creation of Plan. LAB DATA REVIEWED:    Recent Results (from the past 24 hour(s))   CBC WITH AUTOMATED DIFF    Collection Time: 02/04/22  5:17 PM   Result Value Ref Range    WBC 16.2 (H) 3.6 - 11.0 K/uL    RBC 3.56 (L) 3.80 - 5.20 M/uL    HGB 9.8 (L) 11.5 - 16.0 g/dL    HCT 31.6 (L) 35.0 - 47.0 %    MCV 88.8 80.0 - 99.0 FL    MCH 27.5 26.0 - 34.0 PG    MCHC 31.0 30.0 - 36.5 g/dL    RDW 17.4 (H) 11.5 - 14.5 %    PLATELET 016 095 - 868 K/uL    MPV 9.0 8.9 - 12.9 FL    NRBC 0.0 0  WBC    ABSOLUTE NRBC 0.00 0.00 - 0.01 K/uL    NEUTROPHILS 93 (H) 32 - 75 %    LYMPHOCYTES 4 (L) 12 - 49 %    MONOCYTES 2 (L) 5 - 13 %    EOSINOPHILS 0 0 - 7 %    BASOPHILS 0 0 - 1 %    IMMATURE GRANULOCYTES 1 (H) 0.0 - 0.5 %    ABS. NEUTROPHILS 15.1 (H) 1.8 - 8.0 K/UL    ABS. LYMPHOCYTES 0.6 (L) 0.8 - 3.5 K/UL    ABS. MONOCYTES 0.3 0.0 - 1.0 K/UL    ABS. EOSINOPHILS 0.0 0.0 - 0.4 K/UL    ABS. BASOPHILS 0.0 0.0 - 0.1 K/UL    ABS. IMM.  GRANS. 0.2 (H) 0.00 - 0.04 K/UL    DF SMEAR SCANNED      RBC COMMENTS ANISOCYTOSIS  1+       METABOLIC PANEL, COMPREHENSIVE    Collection Time: 02/04/22  5:17 PM   Result Value Ref Range    Sodium 132 (L) 136 - 145 mmol/L Potassium 4.5 3.5 - 5.1 mmol/L    Chloride 99 97 - 108 mmol/L    CO2 22 21 - 32 mmol/L    Anion gap 11 5 - 15 mmol/L    Glucose 102 (H) 65 - 100 mg/dL    BUN 10 6 - 20 MG/DL    Creatinine 0.80 0.55 - 1.02 MG/DL    BUN/Creatinine ratio 13 12 - 20      GFR est AA >60 >60 ml/min/1.73m2    GFR est non-AA >60 >60 ml/min/1.73m2    Calcium 8.9 8.5 - 10.1 MG/DL    Bilirubin, total 0.3 0.2 - 1.0 MG/DL    ALT (SGPT) 15 12 - 78 U/L    AST (SGOT) 22 15 - 37 U/L    Alk. phosphatase 106 45 - 117 U/L    Protein, total 7.7 6.4 - 8.2 g/dL    Albumin 3.3 (L) 3.5 - 5.0 g/dL    Globulin 4.4 (H) 2.0 - 4.0 g/dL    A-G Ratio 0.8 (L) 1.1 - 2.2     POC LACTIC ACID    Collection Time: 02/04/22  8:25 PM   Result Value Ref Range    Lactic Acid (POC) 1.06 0.40 - 2.00 mmol/L   COVID-19 RAPID TEST    Collection Time: 02/04/22  8:26 PM   Result Value Ref Range    Specimen source Nasopharyngeal      COVID-19 rapid test Not detected NOTD     EKG, 12 LEAD, INITIAL    Collection Time: 02/04/22  8:38 PM   Result Value Ref Range    Ventricular Rate 96 BPM    Atrial Rate 96 BPM    P-R Interval 164 ms    QRS Duration 98 ms    Q-T Interval 360 ms    QTC Calculation (Bezet) 454 ms    Calculated R Axis -62 degrees    Calculated T Axis 103 degrees    Diagnosis       Sinus rhythm with premature supraventricular complexes  Left anterior fascicular block  Minimal voltage criteria for LVH, may be normal variant ( Rojelio product )  Septal infarct (cited on or before 20-NOV-2020)  Possible Lateral infarct , age undetermined  Abnormal ECG  When compared with ECG of 20-NOV-2020 15:22,  Vent.  rate has increased BY  32 BPM  T wave inversion no longer evident in Inferior leads  T wave inversion now evident in Lateral leads     URINALYSIS W/MICROSCOPIC    Collection Time: 02/04/22  9:19 PM   Result Value Ref Range    Color YELLOW/STRAW      Appearance CLEAR CLEAR      Specific gravity 1.015 1.003 - 1.030      pH (UA) 5.5 5.0 - 8.0      Protein 30 (A) NEG mg/dL Glucose Negative NEG mg/dL    Ketone 80 (A) NEG mg/dL    Bilirubin Negative NEG      Blood SMALL (A) NEG      Urobilinogen 0.2 0.2 - 1.0 EU/dL    Nitrites Positive (A) NEG      Leukocyte Esterase Negative NEG      WBC 0-4 0 - 4 /hpf    RBC 0-5 0 - 5 /hpf    Epithelial cells FEW FEW /lpf    Bacteria 1+ (A) NEG /hpf   URINE CULTURE HOLD SAMPLE    Collection Time: 02/04/22  9:19 PM    Specimen: Serum; Urine   Result Value Ref Range    Urine culture hold        Urine on hold in Microbiology dept for 2 days. If unpreserved urine is submitted, it cannot be used for addtional testing after 24 hours, recollection will be required.

## 2022-02-06 NOTE — PROGRESS NOTES
Ronald Pearson [facility] 79  [address] [facility], [address] [place] [address]  [phone]      Medical Progress Note      NAME: Luis Adams   :  1926  MRM:  [id number]    Date/Time of service: 2022         Subjective:     Chief Complaint:  Patient was personally seen and examined by me during this time period. Chart reviewed. F/u sepsis. Unable to obtain ROS due to encephalopathy/dementia. Spoke with daughter at bedside. Objective:       Vitals:       Last 24hrs VS reviewed since prior progress note. Most recent are:    Visit Vitals  BP (!) 158/84 (BP 1 Location: Right arm, BP Patient Position: At rest)   Pulse 91   Temp 98.1 °F (36.7 °C)   Resp 20   Ht 5' 3\" (1.6 m)   Wt 54.9 kg (121 lb)   SpO2 95%   BMI 21.43 kg/m²     SpO2 Readings from Last 6 Encounters:   22 95%   22 96%   22 94%   21 94%   21 95%   21 97%            Intake/Output Summary (Last 24 hours) at 2022 0750  Last data filed at 2022 0740  Gross per 24 hour   Intake 559.17 ml   Output 1250 ml   Net -690.83 ml        Exam:     Physical Exam:    Gen:    Frail elderly, nad  HEENT:  Pink conjunctivae, PERRL, hearing intact to voice  Neck:  Supple, without masses, thyroid non-tender  Resp:  No accessory muscle use  Card:   No murmurs, normal S1, S2 without thrills, bruits or peripheral edema  Abd:  Soft, non-tender, non-distended, normoactive bowel sounds are present  Musc:  No cyanosis or clubbing  Neuro: no gross deformities, not following commands   Psych:  calm      Medications Reviewed: (see below)    Lab Data Reviewed: (see below)    ______________________________________________________________________    Medications:     Current Facility-Administered Medications   Medication Dose Route Frequency    lidocaine 4 % patch 1 Patch  1 Patch TransDERmal Q24H    azithromycin (ZITHROMAX) 500 mg in 0.9% sodium chloride 250 mL (VIAL-MATE)  500 mg IntraVENous Q24H    . PHARMACY TO SUBSTITUTE PER PROTOCOL (Reordered from: olmesartan (BENICAR) 40 mg tablet)    Per Protocol    amLODIPine (NORVASC) tablet 10 mg  10 mg Oral DAILY    metoprolol tartrate (LOPRESSOR) tablet 12.5 mg  12.5 mg Oral BID    acetaminophen (TYLENOL) tablet 650 mg  650 mg Oral Q6H PRN    potassium bicarb-citric acid (EFFER-K) tablet 20 mEq  20 mEq Oral BID    pantoprazole (PROTONIX) tablet 40 mg  40 mg Oral ACB    diclofenac (VOLTAREN) 1 % topical gel 4 g  4 g Topical QID PRN    sodium chloride (NS) flush 5-10 mL  5-10 mL IntraVENous PRN    sodium chloride (NS) flush 5-40 mL  5-40 mL IntraVENous Q8H    sodium chloride (NS) flush 5-40 mL  5-40 mL IntraVENous PRN    polyethylene glycol (MIRALAX) packet 17 g  17 g Oral DAILY PRN    ondansetron (ZOFRAN ODT) tablet 4 mg  4 mg Oral Q8H PRN    Or    ondansetron (ZOFRAN) injection 4 mg  4 mg IntraVENous Q6H PRN    enoxaparin (LOVENOX) injection 40 mg  40 mg SubCUTAneous DAILY    cefTRIAXone (ROCEPHIN) 1 g in 0.9% sodium chloride 10 mL IV syringe  1 g IntraVENous Q24H    lactated Ringers infusion  50 mL/hr IntraVENous CONTINUOUS          Lab Review:     Recent Labs     02/06/22  0314 02/05/22  1000 02/04/22  1717   WBC 13.9* 17.0* 16.2*   HGB 8.1* 10.4* 9.8*   HCT 25.8* 33.0* 31.6*    343 357     Recent Labs     02/06/22  0314 02/05/22  1000 02/04/22  1717    134* 132*   K 3.7 3.3* 4.5    103 99   CO2 21 20* 22   GLU 96 105* 102*   BUN 8 6 10   CREA 0.56 0.53* 0.80   CA 8.2* 8.6 8.9   ALB  --   --  3.3*   TBILI  --   --  0.3   ALT  --   --  15     Lab Results   Component Value Date/Time    Glucose (POC) 96 02/02/2022 12:01 PM          Assessment / Plan:     Sepsis: Fever, tachycardia, leukocytosis. Concern for  UTI but urine cx neg - possible abx given prior to cx. CXR w/ concern for atypical PNA.; RVP neg. Follow blood. CTX 2/4-, start azithro to cover for atypical PNA given neg cx.   Monitor.      Acute metabolic encephalopathy: baseline mild dementia. Apparently fairly high functioning and this is a rapid change. Suspect due to the above and advancing dementia. Completed DDNR form with family.      Acute on Chronic pain: schedule lidocaine patch. oxycodone and IV dilaudid PRN. Hypovolemic hyponatremia: IVVD. Resolved with IVF.      Trigeminal Neuralgia: resume gabapentin for now doubt it is contributing to mental status      Pituitary Cyst: Appreciated on MRI and chronic; doubt contributing     HTN: Currently elevated. Continue amlodipine amd metop tar; resume ARB.      Total time spent with patient: 32 Minutes **I personally saw and examined the patient during this time period**                 Care Plan discussed with: Patient and Nursing Staff    Discussed:  Care Plan    Prophylaxis:  Lovenox    Disposition:  Home w/Family           ___________________________________________________    Attending Physician: Radha Napier DO

## 2022-02-06 NOTE — TELEPHONE ENCOUNTER
I called to [facility] with plans for hospice she is interested in the community hospice house given her sister's health and family  Situation overall

## 2022-02-06 NOTE — HOSPICE
[address] [facility]  [phone]    Nursing Note   Patient Name: Wandy Jack  YOB: 1926  Age: 80 y.o. [facility] RN Note:  Hospice consult noted. Chart reviewed. Will contact family to arrange hospice meeting. If there are any questions, please call the main [address] [facility] number at [phone]. Thank you for the opportunity to be of service to this patient and her family. 1715: Spoke with Néstor Rogers who is at bedside. [place] liaison will meet with Sami Al this evening at bedside to assess patient and provide hospice information.

## 2022-02-06 NOTE — PROGRESS NOTES
Called hospitalist on call aprox 0100 for pain meds. Patient isn't swallowing the tylenol crushed in applesauce. Patient's at home caregiver is staying with patient. Patient's pain is 10/10 order for 1mg morphine now. Pain med lasted aprox 2 hours. Patient is crying in pain 10/10. Another order for 1mg one time morphine.

## 2022-02-06 NOTE — PROGRESS NOTES
CM Consult Noted:   4:34 PM- CM notified by attending that pt is in need of LTC placement with hospice services as she is unable to return home with hospice services. Attending wishes for pt to be evaluated for GIP in the meantime- anticipates d/c tomorrow 2/7 vs. 2/8 depending on when we can secure placement. CM did request a COVID-19 test in the meantime. [facility] CM updated and will follow up with pt's family to complete initial assessment and secure placement.      German Lilly, RAMÍREZ, [address]

## 2022-02-06 NOTE — PROGRESS NOTES
Bedside shift change report given to Becky Hammer (oncoming nurse) by Brad Mercado (offgoing nurses). Report included the following information SBAR, Kardex, Intake/Output, MAR and Recent Results.

## 2022-02-06 NOTE — PROGRESS NOTES
Problem: Mobility Impaired (Adult and Pediatric)  Goal: *Acute Goals and Plan of Care (Insert Text)  Description: FUNCTIONAL STATUS PRIOR TO ADMISSION: Patient required minimal assistance for basic and instrumental ADLs. HOME SUPPORT PRIOR TO ADMISSION: The patient lived alone with family/caregivers to provide assistance. Physical Therapy Goals  Initiated 2/6/2022  1. Patient will move from supine to sit and sit to supine  in bed with minimal assistance/contact guard assist within 7 day(s). 2.  Patient will transfer from bed to chair and chair to bed with minimal assistance/contact guard assist using the least restrictive device within 7 day(s). 3.  Patient will perform sit to stand with minimal assistance/contact guard assist within 7 day(s). 4.  Patient will ambulate with minimal assistance/contact guard assist for 15 feet with the least restrictive device within 7 day(s). Outcome: Progressing Towards Goal   PHYSICAL THERAPY EVALUATION  Patient: Carlos Eduardo Lake (13 y.o. female)  Date: 2/6/2022  Primary Diagnosis: [facility]epsis ([facility] [A41.9]        Precautions:   Fall,Bed Alarm, [place], Dementia    ASSESSMENT  Based on the objective data described below, the patient presents with sepsis with decreased activity tolerance, decreased strength, balance, gait and functional mobility requiring assistance PTA and today. Patient's daughter present to assist with history as patient [place] and drowsy. PTA patient lives alone in her home. Dtr, niece or other family members staying overnight and during day time hours to assist with care. Does have some caregivers to come in and help with ADL's. Patient is home alone for small amounts of time per daughter. Daughter is considering discharge options. Expressing interest with SNF rehab and return to home preferable but is considering LTC.       Daughter present during PT activity and was able to see the amount of physical assistance required for getting to EOB and transferring to [facility]. PT provided education on role of PT in inpatient setting. Patient was able to sit EOB max A, sacral sitting flexed posture, high guard. SPT to [facility] and back max A x 1. Incentive spirometer attempted x 5 reps. Patient having difficulty with performing due to weakness. PT educated patient daughter use of suction and oral care to keep mouth hydrated and clean. Patient will benefit from skilled PT while inpatient as patient is well below baseline. Current Level of Function Impacting Discharge (mobility/balance): Max A x 1    Functional Outcome Measure: The patient scored 45/100 on the Barthel outcome measure. Other factors to consider for discharge:      Patient will benefit from skilled therapy intervention to address the above noted impairments. PLAN :  Recommendations and Planned Interventions: bed mobility training, transfer training, gait training, therapeutic exercises, neuromuscular re-education, edema management/control, patient and family training/education, and therapeutic activities      Frequency/Duration: Patient will be followed by physical therapy:  5 times a week to address goals.     Recommendation for discharge: (in order for the patient to meet his/her long term goals)  Therapy up to 5 days/week in SNF setting vs Home with 24/7 care with 1-2 person physical assistance and equipment needs    This discharge recommendation:  Has not yet been discussed the attending provider and/or case management    IF patient discharges home will need the following DME: to be determined (TBD)         SUBJECTIVE:   Patient stated I don't think I can st and    OBJECTIVE DATA SUMMARY:   HISTORY:    Past Medical History:   Diagnosis Date    Anemia     Back pain     Bilateral hearing loss 1/29/2019    H/O: hysterectomy 1/29/2019    Hair loss     History of double vision     Hx of diarrhea     Hx of hemorrhoids     Hypertension     Incontinence of urine     Joint pain     Joint swelling     Memory loss or impairment 1/29/2019    Muscle ache     Muscle weakness     Pernicious anemia 1/28/2019    Pituitary cyst ([facility] 1/29/2019    Resection [id number] and again in 2013 benign    PUD (peptic ulcer disease) 1/29/2019    Surgery for duodenum ? Ulcer in 2009    Sleep difficulties     Stiffness in joint     Vision blurring      Past Surgical History:   Procedure Laterality Date    IR INJ SPINE FLUORO GUIDED  1/5/2021       Personal factors and/or comorbidities impacting plan of care: see above    Home Situation  Home Environment: Private residence  # Steps to Enter: 2  Rails to Enter: No  Wheelchair Ramp: No  One/Two Story Residence: One story  Living Alone: Yes  Support Systems: Child(jasmyn),Other Family Member(s),Caregiver/Home Care Staff (Tia Toth, family lives stays at night/day)  Current DME Used/Available at Home: Shower chair,Commode, bedside,Walker, rolling,Walker, rollator,Cane, straight  Tub or Shower Type: Tub/Shower combination    EXAMINATION/PRESENTATION/DECISION MAKING:   Critical Behavior:  Neurologic State: Alert,Confused  Orientation Level: Oriented to person  Cognition: Decreased attention/concentration,Follows commands     Hearing:     Skin:  Intact  Edema: None  Range Of Motion:  AROM: Generally decreased, functional                       Strength:    Strength: Generally decreased, functional                    Tone & Sensation:   Tone: Normal              Sensation: Intact               Coordination:  Coordination: Generally decreased, functional  Vision:      Functional Mobility:  Bed Mobility:     Supine to Sit: Maximum assistance  Sit to Supine: Moderate assistance  Scooting: Maximum assistance  Transfers:  Sit to Stand: Maximum assistance;Assist x1     Stand Pivot Transfers: Maximum assistance;Assist x1                    Balance:   Sitting: Intact; High guard  Standing: Impaired  Standing - Static: Constant support  Ambulation/Gait Training:              Gait Description (WDL):  (unable)                                         Stairs:NT              Functional Measure:  Barthel Index:    Bathin  Bladder: 5  Bowels: 10  Groomin  Dressin  Feeding: 10  Mobility: 0  Stairs: 0  Toilet Use: 5  Transfer (Bed to Chair and Back): 5  Total: 45/100       The Barthel ADL Index: Guidelines  1. The index should be used as a record of what a patient does, not as a record of what a patient could do. 2. The main aim is to establish degree of independence from any help, physical or verbal, however minor and for whatever reason. 3. The need for supervision renders the patient not independent. 4. A patient's performance should be established using the best available evidence. Asking the patient, friends/relatives and nurses are the usual sources, but direct observation and common sense are also important. However direct testing is not needed. 5. Usually the patient's performance over the preceding 24-48 hours is important, but occasionally longer periods will be relevant. 6. Middle categories imply that the patient supplies over 50 per cent of the effort. 7. Use of aids to be independent is allowed. Score Interpretation (from [address] [address])    Independent   60-79 Minimally independent   40-59 Partially dependent   20-39 Very dependent   <20 Totally dependent     -Priscilla Thomas., Barthel, D.W. (1965). Functional evaluation: the Barthel Index. [facility] ([address] (1997). The Barthel activities of daily living index: self-reporting versus actual performance in the old (> or = 75 years). Journal of [address] [place] [address] 45(7), [facility], JAwildaJAwildaMAwildaF, Saumya Cesar., Romana Just. (). Measuring the change in disability after inpatient rehabilitation; comparison of the responsiveness of the Barthel Index and Functional [place] Measure. Journal of Neurology, Neurosurgery, and Psychiatry, 66(4), 387-384.   Ted Clark Exel, N.J.A, Aubrey Wesley M.A. (2004) Assessment of post-stroke quality of life in cost-effectiveness studies: The usefulness of the Barthel Index and the EuroQoL-5D. Quality of Life Research, 15, 043-64        Physical Therapy Evaluation Charge Determination   History Examination Presentation Decision-Making   MEDIUM  Complexity : 1-2 comorbidities / personal factors will impact the outcome/ POC  LOW Complexity : 1-2 Standardized tests and measures addressing body structure, function, activity limitation and / or participation in recreation  LOW Complexity : Stable, uncomplicated  Other outcome measures Barthel  LOW       Based on the above components, the patient evaluation is determined to be of the following complexity level: LOW     Pain Ratin/10    Activity Tolerance:   Fair and Sleepy/drowsy    After treatment patient left in no apparent distress:   Supine in bed, Call bell within reach, Bed / chair alarm activated, and Caregiver / family present    COMMUNICATION/EDUCATION:   The patients plan of care was discussed with: Registered nurse. Fall prevention education was provided and the patient/caregiver indicated understanding. and Patient/family agree to work toward stated goals and plan of care.     Thank you for this referral.  Manoj Bobby, TONYAT

## 2022-02-07 NOTE — PROGRESS NOTES
Bedside and Verbal shift change report given to Meghana Hanna RN (oncoming nurse) by Rose Mary Mcclain RN (offgoing nurse). Report included the following information SBAR, Kardex, Intake/Output, MAR and Recent Results.

## 2022-02-07 NOTE — HOSPICE
Hospice RN Note: long d/w with daughter, Monica Herrera regarding hospice levels of care and the community hospice house. Pt has shown a rapid decline on this admission and is no longer able to advocate for herself, participate in decision making or perform ADL's. At this time she qualifies for the routine LOC at [facility] in which family has agreed to pay out of pocket costs. Due to pt's rapid decline and minimal PO intake, it is likely she may become symptomatic and require GIP LOC in a short time frame. I was able to speak with pt's other daughter, Misael Alcazar on the phone and discuss hospice in detail. Both daughters share POA and are in agreement of hospice and comfort focused approach with transition to the [facility] tomorrow. Dr. Paola Matthews gave verbal CTI for the hospice diagnosis of Metabolic Encephalopathy.  has arranged for transport via [facility] at 10AM tomorrow Tuesday 2/8/22. RN to leave all IV's in place for transport and call report to [facility] at [phone] when transport arrives.

## 2022-02-07 NOTE — PROGRESS NOTES
Speech pathology note  Reviewed chart and discussed case with RN. Note patient with minimal PO intake and plan is for hospice. Therefore, SLP will sign off. Recommend PO intake per patient preference when patient awake, alert, and accepting/asking for comfort. Thank you.     Peter Bhandari., CCC-SLP

## 2022-02-07 NOTE — PROGRESS NOTES
4:57 PM  Manuela called back and confirmed that [facility] can accept patient tomorrow and the earlier the better and [facility]. Manuela aware. 4:39 PM  Call received from Amy who updated this CM and note to follow, she just talked with daughter and is working towards [facility]. She is calling the daughter for final confirmation and then will call cm back as a stretcher transport will be needed. Have been monitoring chart today for Hospice update per the note yesterday of exploration into Hospice House. Have left a message for  Manuela to call and update through the main number. Reason for Admission: altered mental status, sepsis                      RUR Score:  15%                   Plan for utilizing home health: [facility] follows. PCP: First and Last name:  Hong Grewal MD     Name of Practice:    Are you a current patient: Yes/No:    Approximate date of last visit: a few visits recently. Can you participate in a virtual visit with your PCP: no                    Current Advanced Directive/Advance Care Plan: DNR      Healthcare Decision Maker:                Primary Decision Maker: Phyllis murdock - Daughter - [phone]     Daughter, Olena Romo, has recently been diagnosed with stage 4 cancer, they live together and Albert Domingo is managing things now. Patient lives in a home with 2 steps to enter, Niece stays with her at night and has other caregivers, but there are times when she is alone. Family met with Hospice this weekend, exploring transition to [facility] or SNF with Hospice follow up. At home she has all the equipment needed except a tub bench (has shower chair) but hasnt been using tub recently.                         Transition of Care Plan:    1- final confirmation of Hospice House potentially being tomorrow has been received   2- Transportation to be arranged once Manuela with Hospice returns call    Care Management Interventions  Transition of Care Consult (CM Consult):  (hospice referral.)  Support Systems: Child(jasmyn)  Discharge Location  Patient Expects to be Discharged to[de-identified] Home with hospice

## 2022-02-07 NOTE — HOSPICE
[facility]  [phone]    Patient Name: Meenu Solorzano  YOB: 1926  Age: 80 y.o. [address] [facility] ALESHA Note:  Chart reviewed. Plan of care discussed with patients nurse. In to meet with Gabrielle Rob dtr. Patient is sleeping and appears comfortable at this time. Discussed Hospice philosophy, general plan of care, levels of care, services. Information packet provided. Active listening provided to daughter as she shared that pt has been declining steadily since her daughter Scottie Johnson had been diagnosed with stage 4 Lung CA in December. Patient has had weight loss and difficulty swallowing. Scottie Johnson and pt lived together. Gabrielle Rob is now giving support to both mother and sister. They are hoping pt can be admitted to [facility] and they have funds for routine care if not eligible for inpatient care. Also explained how hospice can help at LTC facility. We will continue to evaluate daily and be available to support this patient and family.     Greta Sandoval RN [facility]

## 2022-02-07 NOTE — PROGRESS NOTES
Ronald Pearson [facility]  [address] [facility], [address] [place] [address]  [phone]      Medical Progress Note      NAME: Alina Salinas   :  1926  MRM:  [id number]    Date/Time of service: 2022         Subjective:     Chief Complaint:  Patient was personally seen and examined by me during this time period. Chart reviewed. F/u sepsis. Unable to obtain full ROS due to encephalopathy/dementia; but denies pain. Spoke with daughter and caregiver at bedside. Objective:       Vitals:       Last 24hrs VS reviewed since prior progress note.  Most recent are:    Visit Vitals  /69 (BP 1 Location: Right upper arm)   Pulse 83   Temp 99.3 °F (37.4 °C)   Resp 16   Ht 5' 3\" (1.6 m)   Wt 54.9 kg (121 lb)   SpO2 93%   BMI 21.43 kg/m²     SpO2 Readings from Last 6 Encounters:   22 93%   22 96%   22 94%   21 94%   21 95%   21 97%    O2 Flow Rate (L/min): 2 l/min       Intake/Output Summary (Last 24 hours) at 2022 1539  Last data filed at 2022 0725  Gross per 24 hour   Intake 870.83 ml   Output 450 ml   Net 420.83 ml        Exam:     Physical Exam:    Gen:    Frail elderly, nad  HEENT:  Pink conjunctivae, PERRL, hearing intact to voice  Resp:  No accessory muscle use  Card:   No murmurs, normal S1, S2 without thrills, bruits or peripheral edema  Abd:  Soft, non-tender, non-distended, normoactive bowel sounds are present  Musc:  No cyanosis or clubbing  Neuro: no gross deformities, not following commands   Psych:  calm      Medications Reviewed: (see below)    Lab Data Reviewed: (see below)    ______________________________________________________________________    Medications:     Current Facility-Administered Medications   Medication Dose Route Frequency    lidocaine 4 % patch 1 Patch  1 Patch TransDERmal Q24H    azithromycin (ZITHROMAX) 500 mg in 0.9% sodium chloride 250 mL (VIAL-MATE)  500 mg IntraVENous Q24H    valsartan (DIOVAN) tablet 160 mg  160 mg Oral DAILY    gabapentin (NEURONTIN) capsule 300 mg  300 mg Oral BID    HYDROmorphone (DILAUDID) syringe 0.2 mg  0.2 mg IntraVENous Q6H PRN    HYDROcodone-acetaminophen (NORCO) 5-325 mg per tablet 0.5 Tablet  0.5 Tablet Oral Q6H PRN    Or    HYDROcodone-acetaminophen (NORCO) 5-325 mg per tablet 1 Tablet  1 Tablet Oral Q6H PRN    amLODIPine (NORVASC) tablet 10 mg  10 mg Oral DAILY    metoprolol tartrate (LOPRESSOR) tablet 12.5 mg  12.5 mg Oral BID    acetaminophen (TYLENOL) tablet 650 mg  650 mg Oral Q6H PRN    pantoprazole (PROTONIX) tablet 40 mg  40 mg Oral ACB    diclofenac (VOLTAREN) 1 % topical gel 4 g  4 g Topical QID PRN    sodium chloride (NS) flush 5-10 mL  5-10 mL IntraVENous PRN    sodium chloride (NS) flush 5-40 mL  5-40 mL IntraVENous Q8H    sodium chloride (NS) flush 5-40 mL  5-40 mL IntraVENous PRN    polyethylene glycol (MIRALAX) packet 17 g  17 g Oral DAILY PRN    ondansetron (ZOFRAN ODT) tablet 4 mg  4 mg Oral Q8H PRN    Or    ondansetron (ZOFRAN) injection 4 mg  4 mg IntraVENous Q6H PRN    enoxaparin (LOVENOX) injection 40 mg  40 mg SubCUTAneous DAILY    cefTRIAXone (ROCEPHIN) 1 g in 0.9% sodium chloride 10 mL IV syringe  1 g IntraVENous Q24H    lactated Ringers infusion  50 mL/hr IntraVENous CONTINUOUS          Lab Review:     Recent Labs     02/07/22  0430 02/06/22  0314 02/05/22  1000   WBC 9.5 13.9* 17.0*   HGB 8.6* 8.1* 10.4*   HCT 27.5* 25.8* 33.0*    307 343     Recent Labs     02/07/22  0430 02/06/22  0314 02/05/22  1000 02/04/22  1717 02/04/22  1717    136 134*   < > 132*   K 3.2* 3.7 3.3*   < > 4.5    105 103   < > 99   CO2 25 21 20*   < > 22   GLU 93 96 105*   < > 102*   BUN 8 8 6   < > 10   CREA 0.50* 0.56 0.53*   < > 0.80   CA 8.4* 8.2* 8.6   < > 8.9   ALB  --   --   --   --  3.3*   TBILI  --   --   --   --  0.3   ALT  --   --   --   --  15    < > = values in this interval not displayed.      Lab Results   Component Value Date/Time Glucose (POC) 96 02/02/2022 12:01 PM          Assessment / Plan:     Sepsis: Fever, tachycardia, leukocytosis. Concern for  UTI but urine cx neg - possible abx given prior to cx. CXR w/ concern for atypical PNA; RVP neg. Follow blood. CTX 2/4-/ azithro 2/6 - to cover for atypical PNA given neg cx. Monitor.      Acute metabolic encephalopathy: baseline mild dementia. Apparently fairly high functioning and this is a rapid change. Suspect due to the above and advancing dementia. Completed DDNR form with family.      Acute on Chronic pain: schedule lidocaine patch. oxycodone and IV dilaudid PRN. Hypovolemic hyponatremia: IVVD. Resolved with IVF.      Trigeminal Neuralgia: c/w gabapentin doubt it is contributing to mental status      Pituitary Cyst: Appreciated on MRI and chronic; doubt contributing     HTN: Currently elevated. Continue amlodipine amd metop tar and ARB.      Total time spent with patient: 32 Minutes **I personally saw and examined the patient during this time period**                 Care Plan discussed with: Patient and Nursing Staff    Discussed:  Care Plan    Prophylaxis:  Lovenox    Disposition:  Hospice            ___________________________________________________    Attending Physician: Marilyn Eldridge DO

## 2022-02-07 NOTE — WOUND CARE
Wound Consult:  New consult Visit. Chart reviewed. Consulted for lip wound. Spoke with patients nurse,  Sage Baker RN. daughter at bedside  Patient is resting on a skyler bed with CATHY and air added mattress. Heels off loaded with pillows. purewcik  Patient is awake, cooperative; requires 2 assists to move side to side in bed. Jh score 13  Assessment:  Upper lip- 0.2x0.2cm dry brown scab  Buttocks, heels, sacrum- no redness, skin intact. Treatment:  Elevated heels and repositioned onto left side. Incontinent care given. Wound Recommendations:  Lip- petroleum jelly as needed to keep moist.  Elevate heels while in bed  Skin Care / PI Prevention Recommendations:  1. Minimize friction/shear: minimize layers of linen/pads under patient. 2. Off load pressure/reposition:  turn and reposition approximately every 2 hours; float heels with pillows or use off loading heel boots; waffle cushion for sitting; position wedge. 3. Manage Moisture - keep skin folds dry; incontinence skin care with incontinence wipes; zinc guard barrier ointment; appropriate sized briefs ; purewick with male pad  in use to help contain urine. 4. Continue to monitor nutrition, pain, and skin risk scale, and skin assessment. Plan:  Spoke with Dr. Katie Juan regarding findings and proposed orders for treatment. Please re-consult should concerns arise despite continued skin/PI prevention measures.   [address], Wound / [address] [facility] Healing Office [phone]

## 2022-02-08 PROBLEM — N39.0 UTI (URINARY TRACT INFECTION): Status: ACTIVE | Noted: 2022-01-01

## 2022-02-08 NOTE — HOSPICE
LCSW placed call to pt's daughter to introduce self and check in as transport has been so delayed. Daughter shared that transport just arrived and pt should be leaving shortly. Per daughter, Rajni Rides pt is doing slightly better today and was able to eat something. She shared that family is holding up well at this time and also that pt's final arrangements are pre-planned with [facility]. LCSW will plan to meet pt tomorrow morning in person and continue to assist family with next steps and support as needed.     RAMÍREZ Martinez, [facility]   [phone]

## 2022-02-08 NOTE — PROGRESS NOTES
Report Given to MEGHANN, and called in earlier to Debbie at hospice house. PIV still in place per hospice request. PT clean and dry.

## 2022-02-08 NOTE — PROGRESS NOTES
[facility] Dosing Services: IV to PO Conversion    The pharmacist has determined that this patient meets P & T approved criteria for conversion from IV to oral therapy for the following medication: Azithromycin    The pharmacist has written the following order for the patient: Azithromycin 500 mg by mouth daily x 3 doses (to complete 5 total days)    Pharmacist Ernestine Guardado

## 2022-02-08 NOTE — HOSPICE
[facility]  [phone]    Inpatient Nursing Admission   Patient Name: Donovan Gonzalez  YOB: 1926  Age: 80 y.o. Date of Hospice Admission: 2/8/2022  Hospice Attending Elected by Patient: Bertha Gomes MD  Primary Care Physician: Jennifer Mcdonnell MD  Admitting RN: Narciso Nyhan, RN   : Diane Robertson    Level of Care (GIP/Routine/Respite): Routine  Facility of Care: [facility]  Patient Room: 13/01     HOSPICE SUMMARY   ER Visits/ Hospitalizations in past year: 2  Hospice Diagnosis: Metabolic Encephalopathy  Onset Date of Hospice Diagnosis: 12/2021  Summary of Disease Progression Leading to Hospice Diagnosis: Per Dr. Martinez Carrier: Donovan Gonzalez is a 80y.o. year old who was admitted to [address] [place] [address] with principle diagnosis of sepsis that has resulted in metabolic encephalopathy. Pt recently admitted to hospital with worsening fatigue, weakness, confusion and inability to thrive. On evaluation found to be septic, possible UTI/pneumonia although no organism identified, treated with IV ceftriaxone initially and now on oral antibiotics to cover for atypical pneumonia. Pt has declined in function and is more weak, frail, deconditioned, requiring more supportive care with ADL's, limited appetite and intake. 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 Pt also has chronic pain from trigeminal neuralgia.     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 o. who was admitted to [address] [place] [address]. The patient's principle diagnosis of Sepsis with Metabolic Encephalopathy has resulted in pain, increased confusion, decreased appetite, fatigue. Functionally, the patient's Palliative Performance Scale has declined over a period of 2 weeks  and is estimated at 30. Objective information that support this patients limited prognosis includes: * Pertinent Lab and or Imaging Tests:        Lab Results   Component Value Date/Time     Sodium 135 (L) 02/08/2022 05:15 AM     Potassium 3.6 02/08/2022 05:15 AM     Chloride 101 02/08/2022 05:15 AM     CO2 31 02/08/2022 05:15 AM     Anion gap 3 (L) 02/08/2022 05:15 AM     Glucose 97 02/08/2022 05:15 AM     BUN 7 02/08/2022 05:15 AM     Creatinine 0.41 (L) 02/08/2022 05:15 AM     BUN/Creatinine ratio 17 02/08/2022 05:15 AM     GFR est AA >60 02/08/2022 05:15 AM     GFR est non-AA >60 02/08/2022 05:15 AM     Calcium 8.5 02/08/2022 05:15 AM            Lab Results   Component Value Date/Time     Protein, total 7.7 02/04/2022 05:17 PM     Albumin 3.3 (L) 02/04/2022 05:17 PM      The patient/family chose comfort measures with the support of Hospice. Patient meets for Routine LOC    Prognosis estimated based on 02/08/22 clinical assessment is:   [] Few to Many Hours  [] Hours to Days   [] Few to Many Days   [] Days to Weeks   [x] Few to Many Weeks   [] Weeks to Months   [] Few to Many Months    ASSESSMENT    Patient self-reports:  [x]  Yes    [] No    SYMPTOMS: Pain? SIGNS/PHYSICAL FINDINGS: Generalized pain, eating bites only for meals of puree diet.      FAST for all dementia:      Learning Assessment:  Patient  Is patient willing/able to learn? Yes  What is the highest level of education completed? Learning preference (written material, demonstration, visual)? demonstration  Learning barriers (ESOL, [place], poor vision)? [place]    Caregiver  Is caregiver willing to learn care for patient? yes  What is the highest level of education completed? HS  Learning preference (written material, demonstration, visual)? Demonstration, reading  Learning barriers (ESOL, [place], poor vision)? CLINICAL INFORMATION     Wt Readings from Last 3 Encounters:   02/08/22 54 kg (119 lb 0.8 oz)   02/04/22 54.9 kg (121 lb)   02/02/22 54.9 kg (121 lb)     Ht Readings from Last 3 Encounters:   02/08/22 5' 3\" (1.6 m)   02/04/22 5' 3\" (1.6 m)   02/02/22 5' 3\" (1.6 m)     Body mass index is 21.09 kg/m². Visit Vitals  /80 (BP 1 Location: Right upper arm, BP Patient Position: At rest)   Pulse 96   Temp 99.7 °F (37.6 °C)   Resp 16   Ht 5' 3\" (1.6 m)   Wt 54 kg (119 lb 0.8 oz)   SpO2 95%   BMI 21.09 kg/m²       LAB VALUES  No results found for this visit on 02/08/22 (from the past 12 hour(s)). No results found for this visit on 02/08/22 (from the past 6 hour(s)). Lab Results   Component Value Date/Time    Protein, total 7.7 02/04/2022 05:17 PM    Albumin 3.3 (L) 02/04/2022 05:17 PM       Currently this patient has:  [x] Supplemental O2 [x] Peripheral IV  [] PICC    [] PORT   [x] Allan Catheter [] NG Tube   [] PEG Tube [] Ostomy    [] AICD: Has ICD been deactivated? [] Yes [] No:______    PLAN     1. Admit to Routine level of care . 4. PRN medications in place for breakthrough symptom management  5. Infection control and prevention as needed Allan care per facility protocol for infection prevention  6. Provide support/education to caregiver/family :  Trey Black, daughter; Adama Killian, daughter  7. Monitor closely for changes in symptoms  8.  Provide support and frequent rounds for patient comfort and safety   9. Maintain skin integrity as tolerated for hospice patient, turning and repositioning for comfort  10. Provide  and  for patient and family support  6. Continue to discuss discharge plan for any changes    Hospice Team Frequency Orders:  Skilled Nurse - Daily x 14 days with 5 PRN visits for symptom control. MSW - 1 x weekly with 5 visits PRN family support and need for volunteer services.  - 1 x weekly with 5 visits PRN spiritual support. CNA - daily x 14 days    ADVANCE CARE PLANNING (Complete in ACP Flow Sheet)   Code Status: DNR  Durable DNR: [x]  Yes  []  No  Code Status Discussed/Confirmed: YEs  Preference for Other Life Sustaining Treatment Discussed/Confirmed: YES  Hospitalization Preference: YES   Advance Care Planning 2022   Patient's Healthcare Decision Maker is: Named in scanned ACP document   Confirm Advance Directive Yes, on file        Service: [] Yes  [x]  No      [] Unknown  Appropriate for Pinning Ceremony:  [] Yes     [x] No  Moravian: Buddhist   Home: [place]    DISCHARGE PLANNING     1. Discharge Plan: Placement  2. Patient/Family teaching: Medications, [facility] orientation  3. Response to patient/family teaching:Verbalized understanding    SOCIAL/EMOTIONAL/SPIRITUAL NEEDS     Spiritual Issues Identified: None identified  Psych/ Social/ Emotional Issues Identified: None identified    Caregiver Support:  [] Provided information on End of Life Care   [] Material Provided: Gone From My [facility]ight or Betzy James contacted, discharge to hospice order received  Dr. Antonio James contacted, agrees to serve as attending provider for hospice and provided verbal certification of terminal illness with life expectancy of 6 months or less. Orders for hospice admission, medications and plan of treatment received. Medication reconciliation completed.   MEDS: See medication list below  DME: Per [facility]  Supplies: Per [facility]  IDT communication to include MD, SN, SW, CH and support team    ALLERGIES AND MEDICATIONS     Allergies:    Allergies   Allergen Reactions    Penicillins Hives    Percocet [Oxycodone-Acetaminophen] Other (comments)     Agitation    Sulfa (Sulfonamide Antibiotics) Hives     Current Facility-Administered Medications   Medication Dose Route Frequency    bisacodyL (DULCOLAX) suppository 10 mg  10 mg Rectal DAILY PRN    [START ON 2/9/2022] senna-docusate (PERICOLACE) 8.6-50 mg per tablet 1 Tablet  1 Tablet Oral DAILY    bisacodyL (DULCOLAX) suppository 10 mg  10 mg Rectal DAILY PRN    acetaminophen (TYLENOL) tablet 650 mg  650 mg Oral Q6H PRN    Or    acetaminophen (TYLENOL) suppository 650 mg  650 mg Rectal Q6H PRN    ondansetron (ZOFRAN ODT) tablet 4 mg  4 mg Oral Q6H PRN    [START ON 2/9/2022] amLODIPine (NORVASC) tablet 10 mg (Patient Supplied)  10 mg Oral DAILY    [START ON 2/9/2022] azithromycin (ZITHROMAX) tablet 500 mg (Patient Supplied)  500 mg Oral DAILY    gabapentin (NEURONTIN) capsule 300 mg  300 mg Oral BID    HYDROcodone-acetaminophen (NORCO) 5-325 mg per tablet 1 Tablet  1 Tablet Oral Q6H PRN    Or    HYDROcodone-acetaminophen (NORCO) 5-325 mg per tablet 0.5 Tablet  0.5 Tablet Oral Q6H PRN    metoprolol tartrate (LOPRESSOR) tablet 12.5 mg  12.5 mg Oral BID    [START ON 2/9/2022] pantoprazole (PROTONIX) tablet 40 mg  40 mg Oral ACB    [START ON 2/9/2022] valsartan (DIOVAN) tablet 160 mg (Patient Supplied)  160 mg Oral DAILY    diclofenac (VOLTAREN) 1 % topical gel 4 g  4 g Topical QID PRN

## 2022-02-08 NOTE — PROGRESS NOTES
Bedside and Verbal shift change report given to Cassy Parisi, On license of [facility] (oncoming nurse) by Nereida Avitia RN (offgoing nurse). Report included the following information SBAR, Kardex, Intake/Output, MAR and Recent Results.

## 2022-02-08 NOTE — PROGRESS NOTES
2/8/2022   CARE MANAGEMENT NOTE:  Pt will discharge to [facility] today per arrangements yesterday (2/7). RN, please call report to [phone]). [facility] was set up for 10 a.m. however the latest ETA is now 12:50 p.m.    Elio

## 2022-02-08 NOTE — PROGRESS NOTES
Physical Therapy  2/8/2022    Chart reviewed for PT treatment. POC now is to discharge today to hospice house. Will discontinue therapy orders and sign off.      Thank Peg Cameron, PT, DPT

## 2022-02-08 NOTE — PROGRESS NOTES
1430: Patient arrived to [facility] via medical transport and assisted into bed without difficulty . 1445: Patient assessment complete. Patient is alert and oriented to self. She is very hard of hearing even with her hearing aides in place. She reports generalized pain \"from the jostling\" during transport, but does not feel as though she needs pain medications at this time. She is incontinent of bowel and bladder, she has been using a purewick while at the hospital, discussed dent catheter with her and she would like to have that. 16 fr Dent catheter placed. Patient's daughter and granddaughter at the bedside, oriented to [address]: Patient's family is leaving the the afternoon. Patient is reporting pain in her back that she rates 4/10 and is requesting pain medication at this time. PRN norco 1/2 tablet administered  1700: Patient is resting with eyes closed, appear sleeping. Neutral facial position observed. 1815: Patient resting in bed with eyes closed, aroused easily to voice. Scheduled gabapentin and metoprolol scheduled. Patient woke up to eat about 10 bite of her dinner and drank 1 cup of water without difficulty.     1900: Report given to Carol Estevez

## 2022-02-08 NOTE — DISCHARGE INSTRUCTIONS
HOSPITALIST DISCHARGE INSTRUCTIONS  NAME: Aixa Knox   :  1926   MRN:  [id number]     Date/Time:  2022 9:46 AM    ADMIT DATE: 2022     DISCHARGE DATE: 2022     ADMITTING DIAGNOSIS:  UTI   Failure to Thrive     DISCHARGE DIAGNOSIS:  UTI   Failure to Thrive     Patient Education        Urinary Tract Infection (UTI) in Women: Care Instructions  Overview     A urinary tract infection, or UTI, is a general term for an infection anywhere between the kidneys and the urethra (where urine comes out). Most UTIs are bladder infections. They often cause pain or burning when you urinate. UTIs are caused by bacteria and can be cured with antibiotics. Be sure to complete your treatment so that the infection does not get worse. Follow-up care is a key part of your treatment and safety. Be sure to make and go to all appointments, and call your doctor if you are having problems. It's also a good idea to know your test results and keep a list of the medicines you take. How can you care for yourself at home? · Take your antibiotics as directed. Do not stop taking them just because you feel better. You need to take the full course of antibiotics. · Drink extra water and other fluids for the next day or two. This will help make the urine less concentrated and help wash out the bacteria that are causing the infection. (If you have kidney, heart, or liver disease and have to limit fluids, talk with your doctor before you increase the amount of fluids you drink.)  · Avoid drinks that are carbonated or have caffeine. They can irritate the bladder. · Urinate often. Try to empty your bladder each time. · To relieve pain, take a hot bath or lay a heating pad set on low over your lower belly or genital area. Never go to sleep with a heating pad in place. To prevent UTIs  · Drink plenty of water each day. This helps you urinate often, which clears bacteria from your system.  (If you have kidney, heart, or liver disease and have to limit fluids, talk with your doctor before you increase the amount of fluids you drink.)  · Urinate when you need to. · If you are sexually active, urinate right after you have sex. · Change sanitary pads often. · Avoid douches, bubble baths, feminine hygiene sprays, and other feminine hygiene products that have deodorants. · After going to the bathroom, wipe from front to back. When should you call for help? Call your doctor now or seek immediate medical care if:    · Symptoms such as fever, chills, nausea, or vomiting get worse or appear for the first time.     · You have new pain in your back just below your rib cage. This is called flank pain.     · There is new blood or pus in your urine.     · You have any problems with your antibiotic medicine. Watch closely for changes in your health, and be sure to contact your doctor if:    · You are not getting better after taking an antibiotic for 2 days.     · Your symptoms go away but then come back. Where can you learn more? Go to http://www.gray.com/  Enter W733 in the search box to learn more about \"Urinary Tract Infection (UTI) in Women: Care Instructions. \"  Current as of: February 10, 2021               Content Version: 13.0  © 2006-2021 [employer]. Care instructions adapted under license by [employer] (which disclaims liability or warranty for this information). If you have questions about a medical condition or this instruction, always ask your healthcare professional. [place] [address] any warranty or liability for your use of this information. MEDICATIONS:     · It is important that you take the medication exactly as they are prescribed. · Keep your medication in the bottles provided by the pharmacist and keep a list of the medication names, dosages, and times to be taken in your wallet.    · Do not take other medications without consulting your doctor Pain Management: per above medications    What to do at Home    Recommended diet:  Dysphagia diet-pureed    Recommended activity: Activity as tolerated    1) Return to the hospital if you feel worse    2) If you experience any of the following symptoms then please call your primary care physician or return to the emergency room if you cannot get hold of your doctor:  Fever, chills, nausea, vomiting, diarrhea, change in mentation, falling, bleeding, shortness of breath, chest pain, severe headache, severe abdominal pain. Follow Up: Follow-up Information     Follow up With Specialties Details Why Contact Info    Domingo Blunt MD Internal Medicine Schedule an appointment as soon as possible for a visit in 1 week Hospital Follow Up  [address] [place] [address]  [phone]              Information obtained by :  I understand that if any problems occur once I am at home I am to contact my physician. I understand and acknowledge receipt of the instructions indicated above.                                                                                                                                            Physician's or R.N.'s Signature                                                                  Date/Time                                                                                                                                              Patient or Representative Signature                                                          Date/Time

## 2022-02-08 NOTE — HOSPICE
RN to leave all IV's in place for transport and call report to [facility] at [phone] when transport arrives. [facility]  [phone]    Hospice Nursing PRE-Admission   Discharge Summary  Patient Name: Katie Carbone  YOB: 1926  Age: 80 y.o. Date of PLANNED Hospice Admission: 22 to [facility] under Routine [facility]  Hospice Attending: Dr Leslie Ruiz  Primary Care Physician: Hong Grewal MD    Primary Contact and Phone: [facility] [phone], [facility] [phone]      ADVANCE CARE PLANNING    Code Status: DNR  Durable DNR: [x]  Yes  []  No  Advance Care Planning 2022   Patient's Healthcare Decision Maker is: Named in scanned ACP document   Confirm Advance Directive Yes, on file       Episcopalian: Episcopal   Home: TBD    HOSPICE SUMMARY     Verbal CTI of terminal diagnosis with life expectancy of 6 months or less received from: Dr Leslie Ruiz    For the Hospice Diagnosis of: metabolic encephalopathy    NCD: /Declined      CLINICAL INFORMATION   Allergies: Allergies   Allergen Reactions    Penicillins Hives    Percocet [Oxycodone-Acetaminophen] Other (comments)     Agitation    Sulfa (Sulfonamide Antibiotics) Hives         Currently this patient has:  2 [x] Peripheral IV      [x] Wounds to mouth      COVID Screening:   Negative 22      ASSESSMENT & PLAN     1. Symptom Issues Identified: pain requring 4x IV doses dilaudid in past 24 hours    2. Spiritual Issues Identified: TBD    3. Psych/ Social/ Emotional Issues Identified: dtr Olena Romo has stg 4 Lung Ca and undergoing treatment      CARE COORDINATION     1. Hospice Consents: signed and scanned by [employer] on       5. Transportation by: [facility]   Scheduled for 10am         6. Verbal Report/Handoff given to: [facility], intake      7. Phone number to MELVIN MCCARTHY [phone]    8.  Supplies/Wound Care: hospice house supplied

## 2022-02-08 NOTE — H&P
[facility]  [phone]    Patient Name: Modesta Reid  YOB: 1926    Date of Provider Hospice Visit: 02/08/22    Level of Care:   [] General Inpatient (GIP)    [x] Routine   [] Respite    Current Location of Care:  [] [facility] [] [facility] [] [id number] [] [facility] [x] Hospice House [facility], patient referred from:  [] [facility] [x] [facility] [] [address] [] [facility] [] Home [] Other:     Date of Original Hospice Admission: 02/08/22  Hospice Medical Director at time of admission: [address] [place] [address] Diagnosis: Sepsis with metabolic encephalopathy  Diagnoses RELATED to the terminal prognosis: Dementia, Chronic pain, Trigeminal Neuralgia  Other Diagnoses: Pituitary Cyst: incidental on MRI and chronic, HTN        HOSPICE SUMMARY   Do not cut and paste chart information other than imaging findings    Modesta Reid is a 80y.o. year old who was admitted to [address] [place] [address] with principle diagnosis of sepsis that has resulted in metabolic encephalopathy. Pt recently admitted to hospital with worsening fatigue, weakness, confusion and inability to thrive. On evaluation found to be septic, possible UTI/pneumonia although no organism identified, treated with IV ceftriaxone initially and now on oral antibiotics to cover for atypical pneumonia. Pt has declined in function and is more weak, frail, deconditioned, requiring more supportive care with ADL's, limited appetite and intake. 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 Pt also has chronic pain from trigeminal neuralgia. Currently PPS 30%  Refer to LCD        The patient/family chose comfort measures with the support of Hospice. HOSPICE DIAGNOSES   Active Symptoms:  1. Chronic pain  2. Dementia  3  Encephalopathy: fluctuating, intermittent  4. Fatigue/weakness/Frailty/debility  5.  Poor appetite  6. Hospice care     PLAN   1. Admit pt for routine hospice care. Pt was living with her daughter who has recently been dxd with cancer and is in process of starting treatments. Pt is more frail and deconditioned now and considering her age and debility more so with recent sepsis, she will need lot more care that her daughter is unable to provide. Other daughter and family are working on placement options versus arranging care at home. 2. Continue most home medications; gabapentin 300mg twice daily to help with neuropathic pain, may use Norco 5/325mg every 6 hours as needed for sever pain  3. Continue and complete Z kurt course for treatment of possible atypical pneumonia  4. Other comfort meds as needed  5. Allan's catheter was inserted here at [facility]; draining clear urine  6. O2 2 l NC for comfort  7. Encourage soft pureed foods as tolerated  8. Spoke to family at bedside; daughter Min Martinez & granddaughter Abdi Amezcua. Reviewed pt's medical hx, recent admission to [facility], current condition, symptoms, barriers to going home, current medications, plan of care and expected prognosis which is likely short but will have to assess and see how she does over next few days here at [facility]. Provided supportive listening    9.  and SW to support family needs  8. Disposition: Placement in LTC with hospice or home hospice with caregiver arrangements  11. Hospice Plan of care was reviewed in detail and agree with current plan of care    Prognosis estimated based on 02/08/22 clinical assessment is:   [] Hours to Days    [x] Days to Weeks    [] Other:    Communicated plan of care with: Hospice Case Manager; Hospice IDT; Care Team     GOALS OF CARE     Patient/Medical POA stated Goal of Care: comfort    [] I have reviewed and/or updated ACP information in the Advance Care Planning Navigator. This information is available in the [address] link in the patient's chart header.     Primary Decision Maker [facility] Agent): Primary Decision Maker: Phyllis murdock - Daughter - [phone]    Resuscitation Status: DNR  If DNR is there a Durable DNR on file? : [] Yes [] No (If no, complete Durable DNR)    HISTORY     History obtained from: Chart review, family at bedside    CHIEF COMPLAINT: Much better  The patient is:   [x] Verbal  [] Nonverbal  [] Unresponsive    HPI/SUBJECTIVE:  Pt is awake, verbal, pleasant, no apparent distress and no complaints at this time other than feeling ride [facility] to [facility] being rough. REVIEW OF SYSTEMS     The following systems were: [x] reviewed  [] unable to be reviewed    Positive ROS include:  Constitutional: fatigue, weakness, in pain, short of breath  Ears/nose/mouth/throat: increased airway secretions  Respiratory:shortness of breath, wheezing  Gastrointestinal:poor appetite, nausea, vomiting, abdominal pain, constipation, diarrhea  Musculoskeletal:pain, deformities, swelling legs  Neurologic:confusion, hallucinations, weakness  Psychiatric:anxiety, feeling depressed, poor sleep  Endocrine:     Adult Non-Verbal Pain Assessment Score:      Face  [] 0   No particular expression or smile  [] 1   Occasional grimace, tearing, frowning, wrinkled forehead  [] 2   Frequent grimace, tearing, frowning, wrinkled forehead    Activity (movement)  [] 0   Lying quietly, normal position  [] 1   Seeking attention through movement or slow, cautious movement  [] 2   Restless, excessive activity and/or withdrawal reflexes    Guarding  [] 0   Lying quietly, no positioning of hands over areas of body  [] 1   Splinting areas of the body, tense  [] 2   Rigid, stiff    Physiology (vital signs)  [] 0   Stable vital signs  [] 1   Change in any of the following: SBP > 20mm Hg; HR > 20/minute  [] 2   Change in any of the following: SBP > 30mm Hg; HR > 25/minute    Respiratory  [] 0   Baseline RR/SpO2, compliant with ventilator  [] 1   RR > 10 above baseline, or 5% drop SpO2, mild asynchrony with ventilator  [] 2   RR > 20 above baseline, or 10% drop SpO2, asynchrony with ventilator     FUNCTIONAL ASSESSMENT     Palliative Performance Scale (PPS): 30%       PSYCHOSOCIAL/SPIRITUAL ASSESSMENT     Active Problems:    * No active hospital problems. *    Past Medical History:   Diagnosis Date    Anemia     Back pain     Bilateral hearing loss 1/29/2019    H/O: hysterectomy 1/29/2019    Hair loss     History of double vision     Hx of diarrhea     Hx of hemorrhoids     Hypertension     Incontinence of urine     Joint pain     Joint swelling     Memory loss or impairment 1/29/2019    Muscle ache     Muscle weakness     Pernicious anemia 1/28/2019    Pituitary cyst ([address] 1/29/2019    Resection [id number] and again in 2013 benign    PUD (peptic ulcer disease) 1/29/2019    Surgery for duodenum ?  Ulcer in 2009    Sleep difficulties     Stiffness in joint     Vision blurring       Past Surgical History:   Procedure Laterality Date    IR INJ SPINE FLUORO GUIDED  1/5/2021      Social History     Tobacco Use    Smoking status: Never Smoker    Smokeless tobacco: Never Used   Substance Use Topics    Alcohol use: Yes     Comment: Rare     Family History   Problem Relation Age of Onset    Other Mother     Cancer Father     Heart Disease Father       Allergies   Allergen Reactions    Penicillins Hives    Percocet [Oxycodone-Acetaminophen] Other (comments)     Agitation    Sulfa (Sulfonamide Antibiotics) Hives      Current Facility-Administered Medications   Medication Dose Route Frequency    bisacodyL (DULCOLAX) suppository 10 mg  10 mg Rectal DAILY PRN    [START ON 2/9/2022] senna-docusate (PERICOLACE) 8.6-50 mg per tablet 1 Tablet  1 Tablet Oral DAILY    bisacodyL (DULCOLAX) suppository 10 mg  10 mg Rectal DAILY PRN    acetaminophen (TYLENOL) tablet 650 mg  650 mg Oral Q6H PRN    Or    acetaminophen (TYLENOL) suppository 650 mg  650 mg Rectal Q6H PRN    ondansetron (ZOFRAN ODT) tablet 4 mg  4 mg Oral Q6H PRN    [START ON 2/9/2022] amLODIPine (NORVASC) tablet 10 mg (Patient Supplied)  10 mg Oral DAILY    [START ON 2/9/2022] azithromycin (ZITHROMAX) tablet 500 mg (Patient Supplied)  500 mg Oral DAILY    diclofenac (VOLTAREN) 1 % topical gel 4 g (Patient Supplied)  4 g Topical QID    gabapentin (NEURONTIN) capsule 300 mg  300 mg Oral BID    HYDROcodone-acetaminophen (NORCO) 5-325 mg per tablet 1 Tablet  1 Tablet Oral Q6H PRN    Or    HYDROcodone-acetaminophen (NORCO) 5-325 mg per tablet 0.5 Tablet  0.5 Tablet Oral Q6H PRN    metoprolol tartrate (LOPRESSOR) tablet 12.5 mg  12.5 mg Oral BID    [START ON 2/9/2022] pantoprazole (PROTONIX) tablet 40 mg  40 mg Oral ACB    [START ON 2/9/2022] valsartan (DIOVAN) tablet 160 mg (Patient Supplied)  160 mg Oral DAILY        PHYSICAL EXAM     Wt Readings from Last 3 Encounters:   02/04/22 54.9 kg (121 lb)   02/02/22 54.9 kg (121 lb)   01/31/22 54.8 kg (120 lb 13.6 oz)       Visit Vitals  /80 (BP 1 Location: Right upper arm, BP Patient Position: At rest)   Pulse 96   Temp 99.7 °F (37.6 °C)   Resp 16   SpO2 95%       Supplemental O2  [x] Yes  [] NO  Last bowel movement:     Currently this patient has:  [] Peripheral IV [] PICC  [] PORT [] ICD    [x] Allan Catheter [] NG Tube   [] PEG Tube    [] Rectal Tube [] Drain  [] Other:     Constitutional: awake, alert, frail, pleasant, no apparent distress  Eyes: pallor  ENMT: moist mucous membranes, hard of hearing  Cardiovascular: normal rate/rhythm, soft systolic murmur 3/6 on precordium, soft edema hands, legs/feet  Respiratory: clear breathing, no distress noted  Gastrointestinal: soft, non tender, no masses, bowel sounds good  Musculoskeletal: no deformities  Skin:scattered bruising  Neurologic: alert, awake, mild confusion  Psychiatric: calm  Other:       Pertinent Lab and or Imaging Tests:  Lab Results   Component Value Date/Time    Sodium 135 (L) 02/08/2022 05:15 AM    Potassium 3.6 02/08/2022 05:15 AM    Chloride 101 02/08/2022 05:15 AM    CO2 31 02/08/2022 05:15 AM    Anion gap 3 (L) 02/08/2022 05:15 AM    Glucose 97 02/08/2022 05:15 AM    BUN 7 02/08/2022 05:15 AM    Creatinine 0.41 (L) 02/08/2022 05:15 AM    BUN/Creatinine ratio 17 02/08/2022 05:15 AM    GFR est AA >60 02/08/2022 05:15 AM    GFR est non-AA >60 02/08/2022 05:15 AM    Calcium 8.5 02/08/2022 05:15 AM     Lab Results   Component Value Date/Time    Protein, total 7.7 02/04/2022 05:17 PM    Albumin 3.3 (L) 02/04/2022 05:17 PM           Total time: 70mts  Counseling / coordination time:   > 50% counseling / coordination?:

## 2022-02-08 NOTE — PROGRESS NOTES
Bedside and Verbal shift change report given to Tyler Solorzano RN (oncoming nurse) by Ofe Boyle RN (offgoing nurse). Report included the following information SBAR, Kardex, Intake/Output, MAR and Recent Results.

## 2022-02-08 NOTE — PROGRESS NOTES
Occupational therapy note:  Patient noted with plan for discharge to hospice house later today. Will complete current orders. Katherine Wilkins MS OTR/L

## 2022-02-08 NOTE — DISCHARGE SUMMARY
[facility]  [address] [facility], [place], [address] [place] [address]  [phone]    Physician Discharge Summary     Patient ID:  Julissa Thurston  [id number]  80 y.o.  7/9/1926    Admit date: 2/4/2022    Discharge date and time: 2/8/2022 9:52 AM    Admission Diagnoses: Sepsis [facility]) [A41.9]    Discharge Diagnoses:  Principal Diagnosis <principal problem not specified>                                            Active Problems:    HTN, goal below 130/80 (1/28/2019)      Pituitary cyst ([address] (1/29/2019)      Overview: Resection py3200 and again in 2013 benign      Sepsis ([facility] [address] (2/4/2022)      UTI (urinary tract infection) (2/8/2022)           Hospital Course:     Sepsis: Fever, tachycardia, leukocytosis. Concern for  UTI but urine cx neg - possible abx given prior to cx. CXR w/ concern for atypical PNA; RVP neg. Blood cx NTD. Completed CTX 2/4-2/08, continue azithro OP to cover for atypical PNA.      Acute metabolic encephalopathy: baseline mild dementia. Improved. Apparently fairly high functioning and this is a rapid change. Suspect due to the above and advancing dementia. Completed DDNR form with family. Dc tohospice [place].      Acute on Chronic pain:  lidocaine patch. Pain management per hospice house.      Hypovolemic hyponatremia: IVVD. Resolved with IVF.      Trigeminal Neuralgia: c/w gabapentin doubt it is contributing to mental status      Pituitary Cyst: Appreciated on MRI and chronic; doubt contributing     HTN: Continue amlodipine amd metop tar and ARB. PCP: Michelle Acosta MD     Consults: none     Significant Diagnostic Studies:     MRI Head   IMPRESSION  No acute findings. Incidental pituitary mass without hydrocephalus or significant involvement of  adjacent vessels.  If clinically appropriate dedicated sella MRI with contrast  can be obtained.       Discharge Exam:  Physical Exam:    Gen:    Frail elderly, nad  HEENT:  Pink conjunctivae, PERRL, hearing intact to voice  Resp:  No accessory muscle use  Card:   No murmurs, normal S1, S2 without thrills, bruits or peripheral edema  Abd:  Soft, non-tender, non-distended, normoactive bowel sounds are present  Musc:  No cyanosis or clubbing  Neuro: no gross deformities, following commands better   Psych:  calm    Disposition: hospice house   Discharge Condition: Stable    Patient Instructions:   Current Discharge Medication List      START taking these medications    Details   azithromycin (ZITHROMAX) 250 mg tablet Take 2 Tablets by mouth daily. Qty: 6 Tablet, Refills: 0         CONTINUE these medications which have NOT CHANGED    Details   lidocaine (Lidoderm) 5 % 1 Patch by TransDERmal route daily as needed for Pain. Apply patch to the affected area for 12 hours a day and remove for 12 hours a day. cyclobenzaprine (FLEXERIL) 5 mg tablet Take 2.5 mg by mouth nightly as needed for Muscle Spasm(s). memantine HCl (NAMENDA XR PO) Take 21 mg by mouth nightly. docusate sodium (Colace) 100 mg capsule Take 1 Capsule by mouth daily for 20 days. Take daily while taking Norco.  Qty: 20 Capsule, Refills: 0      diazePAM (Valium) 2 mg tablet Take 1 Tablet by mouth three (3) times daily as needed for Anxiety (spasm). Max Daily Amount: 6 mg. Qty: 13 Tablet, Refills: 0    Associated Diagnoses: Trigeminal neuralgia of left side of face      gabapentin (NEURONTIN) 100 mg capsule Take 3 Capsules by mouth two (2) times a day. Max Daily Amount: 600 mg.   Qty: 180 Capsule, Refills: 0    Associated Diagnoses: Left facial pain      amLODIPine (NORVASC) 10 mg tablet TAKE 1 TABLET BY MOUTH EVERY DAY  Qty: 30 Tablet, Refills: 5    Associated Diagnoses: HTN, goal below 130/80      olmesartan (BENICAR) 40 mg tablet TAKE 1 TABLET BY MOUTH EVERY DAY  Qty: 30 Tablet, Refills: 5    Associated Diagnoses: HTN, goal below 130/80      omeprazole (PRILOSEC) 20 mg capsule TAKE 1 CAPSULE BY MOUTH EVERY DAY  Qty: 30 Capsule, Refills: 5    Associated Diagnoses: GERD without esophagitis      cyanocobalamin (VITAMIN B12) 1,000 mcg/mL injection 1 mL by IntraMUSCular route every month. Qty: 1 mL, Refills: 5    Associated Diagnoses: Pernicious anemia      metoprolol tartrate (LOPRESSOR) 25 mg tablet Take 0.5 Tablets by mouth two (2) times a day. Qty: 60 Tablet, Refills: 5    Associated Diagnoses: HTN, goal below 130/80      acetaminophen (TYLENOL) 500 mg tablet Take 1,000 mg by mouth every eight (8) hours as needed for Pain. diclofenac (VOLTAREN) 1 % gel Apply 4 g to affected area four (4) times daily as needed for Pain. L. acidophilus/pectin, citrus (ACIDOPHILUS PROBIOTIC PO) Take 1 Capsule by mouth daily. clobetasol (TEMOVATE) 0.05 % external solution Apply  to affected area daily as needed (itchy scalp). Refills: 2      aspirin delayed-release 81 mg tablet Take 81 mg by mouth daily. STOP taking these medications       HYDROcodone-acetaminophen (NORCO) 5-325 mg per tablet Comments:   Reason for Stopping:             Activity: Activity as tolerated  Diet: Dysphagia diet-pureed  Wound Care: None needed    Follow-up with  Follow-up Information     Follow up With Specialties Details Why Contact Info    Lee Red MD Internal Medicine Schedule an appointment as soon as possible for a visit in 1 week [facility] Follow Up  [address]  [phone]            Follow-up tests/labs as above.      Signed:  Keyur Goddard DO  2/8/2022  9:52 AM  **I personally spent 35 min on discharge**

## 2022-02-08 NOTE — PROGRESS NOTES
Problem: Hospice Orientation  Goal: Demonstrate understanding of hospice philosophy, plan of care, and home hospice program  Description: The patient/family/caregiver will demonstrate understanding of hospice philosophy, plan of care and the home hospice program as evidenced by participation in meeting the patient's psychosocial, spiritual, medical, and physical needs inclusive of medical supplies/equipment focusing on symptoms. Outcome: Progressing Towards Goal     Problem: Potential for Alteration in Skin Integrity  Goal: Monitor skin for areas of alteration in skin integrity  Description: Patient/family/caregiver will demonstrate ability to care for patient's skin, monitor for areas of breakdown, and demonstrate methods to prevent breakdown during hospice care. Outcome: Progressing Towards Goal     Problem: Risk for Falls  Goal: Free of falls during inpatient stay  Description: Patient will be free of falls during inpatient stay. Outcome: Progressing Towards Goal     Problem: Alteration in Mobility  Goal: Remain as independent as possible and remain safe in environment  Description: Patient will remain as independent as possible and remain safe in their environment. Outcome: Progressing Towards Goal     Problem: Pain  Goal: Assess satisfaction of level of comfort and symptom control  Outcome: Progressing Towards Goal  Goal: *Control of acute pain  Outcome: Progressing Towards Goal     Problem: Anticipatory Grief  Goal: Explore reactions to and verbalize acceptance of impending loss  Description: Patient/family/caregiver will explore reactions to and verbalize acceptance of impending loss. Outcome: Progressing Towards Goal     Problem: Anxiety/Agitation  Goal: Verbalize or staff assess the ability to manage anxiety  Description: The patient/family/caregiver will verbalize and demonstrate ability to manage the patient's anxiety throughout hospice care.   Outcome: Progressing Towards Goal     Problem: Communication Deficit  Goal: Effectively communicate symptoms, needs, and concerns  Description: Patient/family/caregiver will effectively communicate symptoms, needs and concerns. Outcome: Progressing Towards Goal     Problem: Coping and Emotional Distress  Goal: Demonstrate acceptance of terminal illness and understanding of disease progression  Description: Patient/family/caregiver will demonstrate acceptance of terminal disease and understanding of disease progression while employing appropriate coping mechanisms. Outcome: Progressing Towards Goal     Problem: Depression  Goal: Verbalize and demonstrate ability to manage depression  Description: The patient/family/caregiver will verbalize and demonstrate ability to manage patient's depression throughout hospice care. Outcome: Progressing Towards Goal     Problem: Spiritual Distress  Goal: Distress heard, acknowledged, and addressed  Description: Patient/family/caregiver distress will be heard, acknowledged, and addressed throughout hospice care.   Outcome: Progressing Towards Goal     Problem: Pressure Injury - Risk of  Goal: *Prevention of pressure injury  Outcome: Progressing Towards Goal  Note: Pressure Injury Interventions:  Sensory Interventions: Assess changes in LOC,Avoid rigorous massage over bony prominences,Check visual cues for pain,Float heels,Keep linens dry and wrinkle-free,Minimize linen layers,Pad between skin to skin,Monitor skin under medical devices,Pressure redistribution bed/mattress (bed type)    Moisture Interventions: Absorbent underpads,Apply protective barrier, creams and emollients,Internal/External urinary devices,Maintain skin hydration (lotion/cream),Minimize layers,Moisture barrier    Activity Interventions: Pressure redistribution bed/mattress(bed type)    Mobility Interventions: HOB 30 degrees or less,Float heels,Pressure redistribution bed/mattress (bed type)    Nutrition Interventions: Offer support with meals,snacks and hydration    Friction and Shear Interventions: Feet elevated on foot rest,HOB 30 degrees or less,Lift sheet,Minimize layers                Problem: Grieving  Goal: *Able to express feelings of grief  Outcome: Progressing Towards Goal  Goal: *Able to identify stages of grieving process  Outcome: Progressing Towards Goal     Problem: Infection - Risk of, Central Venous Catheter-Associated Bloodstream Infection  Goal: *Absence of infection signs and symptoms  Outcome: Progressing Towards Goal     Problem: Infection - Risk of, Urinary Catheter-Associated Urinary Tract Infection  Goal: *Absence of infection signs and symptoms  Outcome: Progressing Towards Goal

## 2022-02-08 NOTE — PROGRESS NOTES
Called report in to Sánchez James at [facility]. Pt's PIV is to be left in. Pt is packed and ready for [facility] transport. Talia Ferrara was supposed to be at 10am but still do not have an updated time.

## 2022-02-09 NOTE — PROGRESS NOTES
Problem: Hospice Orientation  Goal: Demonstrate understanding of hospice philosophy, plan of care, and home hospice program  Description: The patient/family/caregiver will demonstrate understanding of hospice philosophy, plan of care and the home hospice program as evidenced by participation in meeting the patient's psychosocial, spiritual, medical, and physical needs inclusive of medical supplies/equipment focusing on symptoms. Outcome: Progressing Towards Goal     Problem: Potential for Alteration in Skin Integrity  Goal: Monitor skin for areas of alteration in skin integrity  Description: Patient/family/caregiver will demonstrate ability to care for patient's skin, monitor for areas of breakdown, and demonstrate methods to prevent breakdown during hospice care. Outcome: Progressing Towards Goal     Problem: Risk for Falls  Goal: Free of falls during inpatient stay  Description: Patient will be free of falls during inpatient stay. Outcome: Progressing Towards Goal     Problem: Alteration in Mobility  Goal: Remain as independent as possible and remain safe in environment  Description: Patient will remain as independent as possible and remain safe in their environment. Outcome: Progressing Towards Goal     Problem: Pain  Goal: Assess satisfaction of level of comfort and symptom control  Outcome: Progressing Towards Goal  Goal: *Control of acute pain  Outcome: Progressing Towards Goal     Problem: Anticipatory Grief  Goal: Explore reactions to and verbalize acceptance of impending loss  Description: Patient/family/caregiver will explore reactions to and verbalize acceptance of impending loss. Outcome: Progressing Towards Goal     Problem: Anxiety/Agitation  Goal: Verbalize or staff assess the ability to manage anxiety  Description: The patient/family/caregiver will verbalize and demonstrate ability to manage the patient's anxiety throughout hospice care.   Outcome: Progressing Towards Goal     Problem: Communication Deficit  Goal: Effectively communicate symptoms, needs, and concerns  Description: Patient/family/caregiver will effectively communicate symptoms, needs and concerns. Outcome: Progressing Towards Goal     Problem: Coping and Emotional Distress  Goal: Demonstrate acceptance of terminal illness and understanding of disease progression  Description: Patient/family/caregiver will demonstrate acceptance of terminal disease and understanding of disease progression while employing appropriate coping mechanisms. Outcome: Progressing Towards Goal     Problem: Depression  Goal: Verbalize and demonstrate ability to manage depression  Description: The patient/family/caregiver will verbalize and demonstrate ability to manage patient's depression throughout hospice care. Outcome: Progressing Towards Goal     Problem: Spiritual Distress  Goal: Distress heard, acknowledged, and addressed  Description: Patient/family/caregiver distress will be heard, acknowledged, and addressed throughout hospice care.   Outcome: Progressing Towards Goal     Problem: Pressure Injury - Risk of  Goal: *Prevention of pressure injury  Outcome: Progressing Towards Goal  Note: Pressure Injury Interventions:  Sensory Interventions: Assess changes in LOC,Check visual cues for pain    Moisture Interventions: Absorbent underpads    Activity Interventions: Pressure redistribution bed/mattress(bed type)    Mobility Interventions: HOB 30 degrees or less    Nutrition Interventions: Document food/fluid/supplement intake,Offer support with meals,snacks and hydration    Friction and Shear Interventions: Feet elevated on foot rest,HOB 30 degrees or less,Lift sheet                Problem: Grieving  Goal: *Able to express feelings of grief  Outcome: Progressing Towards Goal  Goal: *Able to identify stages of grieving process  Outcome: Progressing Towards Goal     Problem: Infection - Risk of, Central Venous Catheter-Associated Bloodstream Infection  Goal: *Absence of infection signs and symptoms  Outcome: Progressing Towards Goal     Problem: Infection - Risk of, Urinary Catheter-Associated Urinary Tract Infection  Goal: *Absence of infection signs and symptoms  Outcome: Progressing Towards Goal     Problem: Falls - Risk of  Goal: *Absence of Falls  Description: Document Roger Fall Risk and appropriate interventions in the flowsheet. Outcome: Progressing Towards Goal  Note: Fall Risk Interventions:  Mobility Interventions: Bed/chair exit alarm    Mentation Interventions: Adequate sleep, hydration, pain control,Bed/chair exit alarm,Door open when patient unattended    Medication Interventions: Bed/chair exit alarm    Elimination Interventions: Call light in reach,Bed/chair exit alarm              Problem: Patient Education: Go to Patient Education Activity  Goal: Patient/Family Education  Outcome: Progressing Towards Goal     Problem: Pressure Injury - Risk of  Goal: *Prevention of pressure injury  Description: Document Jh Scale and appropriate interventions in the flowsheet.   Outcome: Progressing Towards Goal  Note: Pressure Injury Interventions:  Sensory Interventions: Assess changes in LOC,Check visual cues for pain    Moisture Interventions: Absorbent underpads    Activity Interventions: Pressure redistribution bed/mattress(bed type)    Mobility Interventions: HOB 30 degrees or less    Nutrition Interventions: Document food/fluid/supplement intake,Offer support with meals,snacks and hydration    Friction and Shear Interventions: Feet elevated on foot rest,HOB 30 degrees or less,Lift sheet                Problem: Patient Education: Go to Patient Education Activity  Goal: Patient/Family Education  Outcome: Progressing Towards Goal OB Provider reported that the vagina was inspected and no foreign body was found

## 2022-02-09 NOTE — PROGRESS NOTES
Music Therapy Assessment  [facility]    Julissa Thurston [id number]     7/9/1926  80 y.o.  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 ] Sleeping    Communication Status: [  ] Unable to assess - sleeping at time of visit;      Physical Status:   [  ] Oxygen in use  Nemesis.  ] Hard of Hearing [  ] Vision Impaired  [  ] Ambulatory  [  ] Ambulatory with assistance [  ] Non-ambulatory     Music Preferences, Background: _Family reports preference for music of denilson, popular music appropriate to age;    Clinical Problem addressed: Comfort and nonverbal support at end of life;    Goal(s) met in session:  Physical/Pain management (Scale of 1-10):    Pre-session rating ___________    Post-session rating __________  [  ] Increased relaxation   [  ] Affected breathing patterns  [  ] Decreased muscle tension   [  ] Decreased agitation  [  ] Affected heart rate    [  ] Increased alertness     Emotional/Psychological:  [  ] Increased self-expression   [  ] Decreased aggressive behavior   [  ] Decreased feelings of stress  [  ] Discussed healthy coping skills     [  ] Improved mood    [  ] Decreased withdrawn behavior     Social:  [  ] Decreased feelings of isolation/loneliness [  ] Positive social interaction   [  ] Provided support and/or comfort for family/friends    Spiritual:  [X] Spiritual support    [  ] Expressed peace  [  ] Expressed denilson    [  ] Discussed beliefs    Techniques Utilized (Check all that apply):   [  ] Procedural support MT [  ] Music for relaxation [X] Patient preferred music  [  ] Luly analysis  [  ] Melissa Mutton choice  [  ] Music for validation  [  ] Entrainment  [  ] Movement to music [  ] Guided visualization  [  ] Una Syed  [  ] Patient instrument playing [  ] Melissa Mutton writing  [  ] Yao Mendoza along   [  ] Improvisation  [X] Sensory stimulation  [  ] Active Listening  [  ] Music for spiritual support [  ] Making of CDs as gifts    Session Observations:  Mrs. Beltran Galdamez was referred to music therapy by Ashley Cota RN, at family's request. Mrs Roula Easton has been involved in music since she was a child, singing in [address] [place] [address] and in [employer] choirs. She belongs to the [employer], and hymns of this tradition were played for her, along with older popular songs she may have known. Her granddaughter provided music preferences, and also shared that a meaningful activity was art making, until this past weekend. At the visit, Mrs. Roula Easton was sleeping soundly, and moved only slightly when her name was called. She opened her eyes slightly during the visit, and made some facial expressions that demonstrated she was hearing the guitar accompaniment and the sound of my voice. She appeared to be calm, and no further attempts were made to wake her. Thank you for the opportunity to provide music therapy to Mrs. Satya Hollins.   [facility]   Music [address] Certified  Spiritual Care Services  Referral- based service

## 2022-02-09 NOTE — PROGRESS NOTES
Pharmacy Hospice Monitoring    February 9, 2022  Attending: Aron Wilson MD    Pharmacist monitoring provided for this 80y.o. year old female at [facility], admitted for Metabolic Encephalopathy   Principle Hospice Diagnosis:  Level of care: Routine   Length of stay:  1   Active Problem List:  1. Chronic pain  2. Dementia  3  Encephalopathy: fluctuating, intermittent  4. Fatigue/weakness/Frailty/debility  5. Poor appetite  6.  Hospice care    Treatment Goal Check List     Admitting dianosis treated appropriately : YES    Nausea/Vomiting controlled: YES    Pain Controlled: YES - chronic pain d/t trigeminal neuralgia   Current Analgesic Therapy (168h ago, onward)       Ordered     Start Stop    02/08/22 1556  HYDROcodone-acetaminophen (NORCO) 5-325 mg per tablet 1 Tablet  1 Tablet,   Oral,   EVERY 6 HOURS AS NEEDED        References:    Lexicomp   \"Or\" Linked Group Details    02/08/22 1550 --    02/08/22 1556  HYDROcodone-acetaminophen (NORCO) 5-325 mg per tablet 0.5 Tablet  0.5 Tablet,   Oral,   EVERY 6 HOURS AS NEEDED        References:    Lexicomp   \"Or\" Linked Group Details    02/08/22 1550 --    02/08/22 1556  acetaminophen (TYLENOL) tablet 650 mg  650 mg,   Oral,   EVERY 6 HOURS AS NEEDED        References:    Lexicomp   \"Or\" Linked Group Details    02/08/22 1543 --    02/08/22 1556  acetaminophen (TYLENOL) suppository 650 mg  650 mg,   Rectal,   EVERY 6 HOURS AS NEEDED        References:    Lexicomp   \"Or\" Linked Group Details    02/08/22 1543 --                    Agitation/Anxiety/ Delirium controlled: YES    Depression controlled: YES    Secretions controlled : YES    Constipation/Bowel routine controlled: YES last BM 2/7    SOB/ Dyspnea controlled: YES    Insomnia: YES     Notes:  Pt has and IV in her L AC  Cipro 250 mg q12 h x 7 days for UTI

## 2022-02-09 NOTE — HOSPICE
1900 Received report from [employer]. 1910 Patient is sleeping with a relaxed face with grand daughter at bedside. Please see flow sheet for assessment. 2200 Patient is sleeping with relaxed face. 2250 Patient woke up and is complaining of pain in back and head. Gave prn dose of Norco 0.5 mg.Repositioned in bed.  2350 Patient is sleeping with no facial grimacing. 0130 Patient is snoring with relaxed face. 0330 Patient is resting in bed with eyes closed with relaxed face. 1060 Patient is awake, getting a bed bath. Repositioned in bed.  0515 Patient states her head hurts, pulled dose of 0.5 tab of Norco and her scheduled 12.5 mg of metoprolol. Patient took her scheduled dose of metoprolol, but refused the dose of Norco stating that her head does not hurt now.  0620 Patient is sleeping with hand on head with relaxed face. 0700 Report given.   NAME OF PATIENT:  Satya Hollins    LEVEL OF CARE:  Routine    REASON FOR GIP:   NA    *PATIENT REMAINS ELIGIBLE FOR GIP LEVEL OF CARE AS EVIDENCED BY: (MUST BE ADDRESSED OF PATIENT GIP)  NA    REASON FOR RESPITE:  NA    O2 SAFETY:  Tanks stored in [place] , No petroleum based products on face while oxygen in use and Oxygen sign on the door    FALL INTERVENTIONS PROVIDED:   Implemented/recommended use of non-skid footwear, Implemented/recommended use of fall risk identification flag to all team members, Implemented/recommended assistive devices and encouraged their use, Implemented/recommended resources for alarm system (personal alarm, bed alarm, call bell, etc.) , Implemented/recommended environmental changes (remove hazards, lower bed, improve lighting, etc.) and Implemented/recommended increased supervision/assistance    INTERDISPLINARY COMMUNICATION/COLLABORATION:  Physician and RN, CNA    NEW MEDICATION INITIATION DOCUMENTATION:      Reason medication is being initiated:  NA    MD / Provider name consulted re: change in status / initiation of new medication:  NA    New Symptom(s):  NA    New Order(s):  NA    Name of the person notified of the changes:  NA    Name of person being taught:  NA  Instructions given:  NA    Side Effects taught:  NA    Response to teaching:  NA      COMFORTABLE DYING MEASURE:  Is Patient/family satisfied with symptom level?  yes    DISCHARGE PLAN:  Return home.

## 2022-02-09 NOTE — HOSPICE
[address] [facility] Help to Those in Need  [phone]    Social Work Admission Note  Patient Name: Katie Carbone  YOB: 1926  Age: 80 y.o. Date of Visit: 22  Facility of Care: [facility]  Patient Room:      Hospice Attending: Rebeca Araujo MD  Hospice Diagnosis: Metabolic Encephalopathy    Level of Care:    [x]  GIP    []  Respite   []  Routine    NARRATIVE   LCSW met with pt at bedside. She was received lying in hospital bed and appeared to be peacefully sleeping. No family present at time of visit. SW aware that daughter, Olena Romo just started treatment for stage 4 cancer yesterday. LCSW checked in with team regarding pt's condition - she has been very lethargic today but was able to briefly answer questions when awake. LCSW will continue to follow closely for support and resources as needed. ADVANCE CARE PLANNING    Code Status: DNR  Durable DNR: _ Yes  X No  Advance Care Planning 2022   Patient's Healthcare Decision Maker is: Named in scanned ACP document   Confirm Advance Directive Yes, on file       Relationship Status:  []  Single     []        []      []  Domestic Partner     []  /  []  Common Law  []    [x]  Unknown    If in a relationship, name of partner/spouse:  Duration of relationship:    Hindu: Mandaen     Home: [facility]  Resources Provided: Supportive presence at bedside     Social Work Initial Assessment     Gender:  female    Race/Ethnicity: (ruby all that apply)  []  American [place] [facility] or [place] Native  []  [address]  []  Black or [place] American  []   or   []  Native [facility] or [place]  [x]  Kulwinder Robertson  []  Unknown      Service:    []  Yes   [x]  No       []  Unknown  Appropriate for Pinning Ceremony:   []  Yes      [x]  No  Is patient using VA benefits?    []  Yes      [x]  No     Primary Language: English  []   Needed  []   utilized during visit    Ability to express thoughts/needs/feelings  []  Expressed thoughts/feelings/needs without difficulty  []  Requires extra time and cuing  []  Speech limited single words  []  Uses only gestures (eye, blinking eye or head movement/pointing)  [x]  Unable to express thoughts/feelings/needs (speech unintelligible or inappropriate)  []  Unresponsive  Notes:      Mental Status:  []  Alert-oriented to:     []  Person     []  Place     []  Time  []  Comatose-responds to:    []   Verbal stimuli    []  Tactile stimuli    []  Painful stimuli  []  Forgetful  []  Disoriented/Confused  [x]  Lethargic  []  Agitated  []  Other (specify):    Notes:      Patients description of Illness/Current Health Status:    [x]  Patient unable to discuss  []  Patient unwilling to discuss  []  (Specify)        Knowledge/Understanding of Disease Process  Patient:    []  Demonstrates knowledge/understanding of disease process   []  Demonstrates knowledge/understanding of treatment plan   []  Demonstrates knowledge/understanding of prognosis   []  Demonstrates acceptance of prognosis   []  Demonstrates knowledge/understanding of resuscitation status   []  Other (specify)  Caregiver:   [x]  Demonstrates knowledge/understanding of disease process   [x]  Demonstrates knowledge/understanding of treatment plan   [x]  Demonstrates knowledge/understanding of prognosis   [x]  Demonstrates acceptance of prognosis   []  Demonstrates knowledge/understanding of resuscitation status   []  Other (specify)  Notes:      Patients living arrangement/care setting:  Use the PRIOR COLUMN when the PATIENTS current health status necessitated a change in his/her primary residence.     Prior Current Response              []             []    Patients own home/residence              []             []    Home of family member/friend              []             []    Boarding home              []             []    Assisted living facility/senior care center              []             [] Hospital/Acute care facility              []             []    Skilled nursing facility              []             []    Long term care facility/Nursing home              []             [x]    Hospice in Patient      Primary Caregiver:  []  No Primary Caregiver  Name of Primary Caregiver: Janneth Feliciano [phone]  Relationship or Primary Caregiver:    []  Spouse/Significant other       [x]  Natural Child        []  Step child       []  Sibling   []  Parent   []  Friend/Neighbor   []  Community/Taoism Volunteer   []  Paid help   []  Other (specify):___________  Notes:       Family members/Significant others:  Name: Kennedi Brian  Relationship: Daughter  Phone Number: [phone]  Actively involved in care? [x]  Yes  []  No    Name:  Relationship:  Phone Number:  Actively involved in care? []  Yes  []  No    Name:  Relationship:  Phone Number:  Actively involved in care?   []  Yes  []  No    Social support systems: (select ONE best description)  []  Excellent social support system which includes three or more family members or friends  [x]  Good social support system which includes two or less members or friends  []  [address] support which includes one family member or friend  []  Poor social support; no family members or friends; basically ALONE  Notes:      Emotional Status: (ruby all that apply)    Patient Caregiver Response                 []                []    Mood/Affect stable and appropriate                   []                []    Angry                 []                []    Anxious                 []                []    Apprehensive                 []                []    Avoidant                 []                []    Clinging                 []                []    Depressed                 []                []    Distraught                 []                []    Elated                 []                []    Euphoric                 []                []    Fearful                 [] []    Flat Affect                 []                []    Helpless                 []                []    Hostile                 []                []    Impulsive                 []                []    Irritable                 []                []    Labile                 []                []    Manic                 []                []    Restlessness                 []                []    Sad                 []                []    Suspicious                 []                []    Tearful                 []                []    Withdrawn     Notes:     Coping Skills (strengths/weakness):    Patient: Coping Skills (strength/weakness):    Family/caregiver (strength/weakness):     [place] of care (ruby all that apply):     [x]  No burden evident   []  Family must administer medications   []  Illness causing financial strain   []  Family/Support feels overwhelmed   []  Family/Support sleep disturbed with patients care   []  Patients care causes extra physical stress  of death  []  Illness causes changes in family lifestyle  []  Illness impacting family/support employment  []  Family experiencing increased time demands  []  Patients behavior endangers family  []  Denial of patients illness  []  Concern over outcome of illness/fear  []  Patients behavior embarrassing to family   Notes:      Risk Factors: (ruby all that apply):    [x]  No burden evident   []  Alcohol abuse  []  Financial resources inadequate to meet basic needs (food/house/etc)  []  Financial resources inadequate to meet health care needs (supplies/equipment/medications)  []  Food/nutrition resources inadequate  []  Home environment unsafe/inadequate for home care  []  Homicidal risk  []  Lives alone or without concerned relatives  []  Multiple medications/complex schedule  []  Physical limitations increase likelihood of falls  []  Plan of care/treatments complicated  []  Substance use/abuse  []  Suicidal risk  []  Visual impairment threatens safety/ability to perform self-care  []  Other (specify):     Abuse/Neglect (actual/potential risks):  [x]  No signs of abuse/neglect  []  History of abuse/neglect                 []  [id number]          []  Sexual  []  History of domestic violence  []  Lacks adequate physical care  []  Lacks emotional nurturing/support  []  Lacks appropriate stimulation/cognitive experiences  []  Left alone inappropriately  []  Lacks necessary supervision  []  Inadequate or delayed medical care  []  Unsafe environment (i.e guns/drug use/history of violence in the home/etc.)  []  Bruising or other physical signs of injury present  []  Other (specify):  Notes:   []  Refer to child/adult protective services      Current Sources of Stress (in Addition to Current Illness):   [x]  None reported  []  Bills/Debt    []  Career/Job change    []   (short term)    []   (long term)    []  Death of a child (recent)    []  Death of a parent (recent)   []  Death of a spouse (recent)   []  Employment status changed   []  Family discord    []  Financial loss/Inadequate inther (specify):come  []  Job loss  []  Legal issues unresolved  []  Lifestyle change  []  Marital discord  []  Marriage within the last year  []  Paperwork (insurance/legal/etc) overwhelming  []  Separation/Divorce  []  Other (specify):  Notes:      Current [employer] Being Utilized     1. [facility] for routine LOC        Interventions/Plan of Care     1. Assess social and emotional factors related to coping with end of life issues  2. Community resource planning/referral   3. Relocation to different care setting if/when symptoms stabilize  4. Discharge Planning     1.  Should pt remain stable, will need facility placement    MSW Assessment Completed by: Lis Avila  02/09/22    Time In: 400pm      Time Out: 425pm

## 2022-02-09 NOTE — PROGRESS NOTES
0700  Report received  0715  Pt lying in bed, asleep. Pt aroused to assessment but remained very lethargic. Pt shook her head and whispered no when asked is she was having any pain. Lungs clear but diminished. No cough noted. O2 at 2lpm.  + bowel sounds. BS hyperactive. Abd soft and nontender. Last reported BM was 2-7. Allan is draining. Straw colored urine. No edema noted. Skin is intact. Pt has and IV in her L AC. Dressing is clear and intact. 0840  Pt tolerated her meds with oatmeal.  Pt only took bites of her breakfast and 1/2 cup of water. 0900  Pt's Dtr Filipe Chapin at the bedside. Rn updated her on  Pt snoring.    1110. Pt asleep. 1230  Pt continues to sleep. Unable to arouse for lunch. 1315  Music Therapy in to play form Ms Norbert Mckeon. Pt did not respond during the visit. 1430  Pt sleeping. [address] in to visit. Pt asleep. 1700  Pt asleep. 1800  Pt tolerated her meds crushed in applesauce. Pt also drank 1/2 of her thickened cranberry juice. And tolerated 1 container of applesauce. Pt coughed after swallowing water. 1900  Report given. NAME OF PATIENT:  Satya Hollins    LEVEL OF CARE:  Routine    REASON FOR GIP:   n/a    *PATIENT REMAINS ELIGIBLE FOR GIP LEVEL OF CARE AS EVIDENCED BY: (MUST BE ADDRESSED OF PATIENT GIP)   n/a      REASON FOR RESPITE:  n/a    O2 SAFETY:  Oxygen sign on the door    FALL INTERVENTIONS PROVIDED:   Implemented/recommended assistive devices and encouraged their use, Implemented/recommended resources for alarm system (personal alarm, bed alarm, call bell, etc.)  and Implemented/recommended environmental changes (remove hazards, lower bed, improve lighting, etc.)    INTERDISPLINARY COMMUNICATION/COLLABORATION:  Physician, MSW, [place] and RN, CNA    NEW MEDICATION INITIATION DOCUMENTATION:  No new medications initiated.       Reason medication is being initiated:  n/a    MD / Provider name consulted re: change in status / initiation of new medication:  n/a    New Symptom(s):  n/a    New Order(s):  N/a    Name of the person notified of the changes:  n/a    Name of person being taught:  n/a    Instructions given:  n/a    Side Effects taught:  n/a    Response to teaching:  n/a      COMFORTABLE DYING MEASURE:  Is Patient/family satisfied with symptom level?  yes    DISCHARGE PLAN:  Pt will remain at the [facility] until she stabilizes. Then she will go to a nursing facility and continue to be follofwed by Home Hospice.

## 2022-02-10 NOTE — HOSPICE
1900 Received report from [facility]. 1910 Patient is sleeping with a relaxed face, respirations of 14. Please see flow sheet for assessment. 2000 Patient in bed with Granddaughter at bedside. 2105 Gave patient scheduled dose of Cipro 250 mg with applesauce. Patient took medication but did not want anymore of the applesauce after taking medication. Offered patient thickened apple juice patient took a few sips and was done. 2300 Patient is sleeping repositioned in bed and turned out lights.  0100 Patient is sleeping with a relaxed face. 0200 Repositioned patient in bed and washed patient's face. Patient went right back to sleep.  0400 Patient is sleeping with a relaxed face. 0500 Patient is crying and moaning, she grimaced with movement. Gave prn dose of Norco 0.5 mg and scheduled metoprolol. Patient would not say she was in pain her speech was garbled. She did take her medication in applesauce and drank sips of thickened apple juice. Repositioned patient in bed.  7705 Patient is sleeping with a relaxed face snoring.   NAME OF PATIENT:  Satya Hollins    LEVEL OF CARE:  Routine    REASON FOR GIP:   NA    *PATIENT REMAINS ELIGIBLE FOR GIP LEVEL OF CARE AS EVIDENCED BY: (MUST BE ADDRESSED OF PATIENT GIP)  NA    REASON FOR RESPITE:  NA    O2 SAFETY:  Tanks stored in [place] , No petroleum based products on face while oxygen in use and Oxygen sign on the door    FALL INTERVENTIONS PROVIDED:   Implemented/recommended use of fall risk identification flag to all team members, Implemented/recommended assistive devices and encouraged their use, Implemented/recommended resources for alarm system (personal alarm, bed alarm, call bell, etc.) , Implemented/recommended environmental changes (remove hazards, lower bed, improve lighting, etc.) and Implemented/recommended increased supervision/assistance    INTERDISPLINARY COMMUNICATION/COLLABORATION:  Physician, MSW and RN, CNA    [address] DOCUMENTATION:  NA    Reason medication is being initiated:  NA    MD / Provider name consulted re: change in status / initiation of new medication:  NA    New Symptom(s):  NA    New Order(s):  NA    Name of the person notified of the changes:  NA    Name of person being taught:  NA    Instructions given:  NA    Side Effects taught:  NA    Response to teaching:  NA      COMFORTABLE DYING MEASURE:  Is Patient/family satisfied with symptom level?  yes    DISCHARGE PLAN:  Home

## 2022-02-10 NOTE — PROGRESS NOTES
0700  Report received from Juda Glad, [address]  Patient resting quietly in bed with eyes closed. Appears to be sleeping. No evidence of pain or discomfort noted. 0930  Patient remains asleep. Snoring noted. 1000  Scheduled AM medications administered crushed in applesauce. Substantial difficulty swallowing noted. [address] [employer] with Dr. Rony Rodriguez. Patient's level of care change to [facility] is required to manage pain symptoms. All PO medications discontinued due to patient's inability to tolerate PO medications. 1130  Telephone call to patient's daughter Rajni Hernandez with update. Rajni Hernandez understands and agrees with the new plan of care, and intends to visit her mother this afternoon. 1230  Patient repositioned to right side. Facial grimacing and moaning noted. [id number]  Patient's daughter Rajni Hernandez arrived at [facility] to visit. PRN Dilaudid administered. 1345  Patient resting quietly with eyes closed. No moaning or facial grimacing noted. Family at bedside. 2325 [facility] therapist arrived to sing with patient's daughter and granddaughter. 1530  Humidification added to patient's 02. Family reports that patient opened eyes briefly in response to their voices. 1630  Patient bathed and linens and gown changed. Moaning and grimacing noted during care. 1645  PRN Lorazepam and Dilaudid administered. Difficulty flushing IV access noted. 1745  Replaced patient's left peripheral antecubital IV access with right peripheral IV access. Mouth breathing and snoring noted. [address] [facility]  Patient's granddaughters arrived to visit with patient. Family update and support provided. Mouthcare completed. 1900 Report given to oncoming nurse.     NAME OF PATIENT:  Satya Hollins    LEVEL OF CARE:  [facility]    REASON FOR GIP:   Pain, despite numerous changes in medications, Medication adjustment that must be monitored 24/7 and Stabilizing treatment that cannot take place at home    *PATIENT REMAINS ELIGIBLE FOR [facility] LEVEL OF CARE AS EVIDENCED BY: (MUST BE ADDRESSED OF PATIENT GIP)  Patient requires frequent nursing assessment and medication adjustment, and IV medications to manage pain and discomfort. REASON FOR RESPITE:  NA    O2 SAFETY:  Concentrator positioning (6\" from furniture/drapes), Tanks stored in [place] , No petroleum based products on face while oxygen in use and Oxygen sign on the door    FALL INTERVENTIONS PROVIDED:   Implemented/recommended use of non-skid footwear, Implemented/recommended use of fall risk identification flag to all team members, Implemented/recommended assistive devices and encouraged their use, Implemented/recommended resources for alarm system (personal alarm, bed alarm, call bell, etc.) , Implemented/recommended environmental changes (remove hazards, lower bed, improve lighting, etc.) and Implemented/recommended increased supervision/assistance    INTERDISPLINARY COMMUNICATION/COLLABORATION:  Physician, MSW, [place] and RN, CNA    NEW MEDICATION INITIATION DOCUMENTATION:  Consulted AT MD to report change in pt status, Obtained Order from Provider for initiation of symptom relief medication /other medication needed and Documentation completed in Clinical Note in [facility]    Reason medication is being initiated:  Patient unable to tolerate PO medications. IV medications required to manage symptoms of pain and discomfort. MD / Provider name consulted re: change in status / initiation of new medication:  Dr. Joe Field Symptom(s):  Pain, inability to tolerate PO medications    New Order(s):  IV comfort medications    Name of the person notified of the changes:  Joey Hammond    Name of person being taught:  Joey Hammond    Instructions given:  Yes    Side Effects taught:  Yes    Response to teaching:  Joey Hammond acknowledged understanding and agreement. COMFORTABLE DYING MEASURE:  Is Patient/family satisfied with symptom level?   Yes    DISCHARGE PLAN:  End of life vs placement

## 2022-02-10 NOTE — HOSPICE
This was an initial visit to assess needs and offer support. When I entered the room patient was in bed and appeared to be sleeping. She did open her eyes as I offered a greeting. I introduced myself and she responded but I could not understand all of what she said. I do know that she asked if her daughter, Lino Carrasco, was present. Informed her, her daughter had been but was not at the present time. She closed her eyes and appeared to drift back off to sleep. [employer]. Informed her of my visit with her mother. She noted she left to get sleep while her mother was resting. Her plan is to visit tomorrow unless we feels her mother needs her to come back tonight. Discussed with nurse, Mrs Jenny Avila appears to be resting conformably. We agreed that Lino Carrasco plans to return tomorrow seems like a reasonable plan.

## 2022-02-10 NOTE — PROGRESS NOTES
[facility]  [phone]    Patient Name: Philly Dominguez  YOB: 1926    Date of Provider Hospice Visit: 02/10/22    Level of Care:   [x] General Inpatient (GIP)    [] Routine   [] Respite    Current Location of Care:  [] [facility] [] [facility] [] [facility] [] [facility] [x] Hospice House [facility], patient referred from:  [] [facility] [x] [facility] [] [facility] [] [facility] [] Home [] Other:     Date of Original Hospice Admission: 02/08/22  Hospice Medical Director at time of admission: [address] [place] [address] Diagnosis: Sepsis with metabolic encephalopathy  Diagnoses RELATED to the terminal prognosis: Dementia, Chronic pain, Trigeminal Neuralgia  Other Diagnoses: Pituitary Cyst: incidental on MRI and chronic, HTN        HOSPICE SUMMARY   Do not cut and paste chart information other than imaging findings    Philly Dominguez is a 80y.o. year old who was admitted to [address] [place] [address] with principle diagnosis of sepsis that has resulted in metabolic encephalopathy. Pt recently admitted to hospital with worsening fatigue, weakness, confusion and inability to thrive. On evaluation found to be septic, possible UTI/pneumonia although no organism identified, treated with IV ceftriaxone initially and now on oral antibiotics to cover for atypical pneumonia. Pt has declined in function and is more weak, frail, deconditioned, requiring more supportive care with ADL's, limited appetite and intake. 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 Pt also has chronic pain from trigeminal neuralgia. Currently PPS 30%  Refer to LCD        The patient/family chose comfort measures with the support of Hospice. HOSPICE DIAGNOSES   Active Symptoms:  1. Chronic pain; nonverbal pain  2. Decreased responsiveness  3  Encephalopathy: worsened  4. Fatigue/weakness/Frailty/debility: worse  5. Inability to swallow  6. Shortness of breath  7. Hospice care     PLAN   1. Change LOC to GIP as pt has declined significantly since admission, requiring more close nursing assessment, monitoring for symptoms, also needs IV medications as she is having difficulty with swallowing even crushed meds and at high risk for aspiration. pt will need more aggressive medical management and expected to decline further, unlikely she will stabilize and be able to go home. 2. D/c oral medications; difficult administration and risk of choking and aspiration  3. Initiate IV access  4. Dilaudid 0.5mg IV every 15mts as needed  5. Ativan 0.5mg IV every 15mts as needed  6. Other comfort meds as needed  7. Continue Allan's catheter care  8. O2 2 l NC for comfort  9. No family at bedside but RN Xenia Augustine is calling them to provide update. I will talk them when they come in.     10.  and SW to support family needs  6. Disposition: will have to see how she does over next few days; likely will decline further and pass here  12. Hospice Plan of care was reviewed in detail and agree with current plan of care    Prognosis estimated based on 02/10/22 clinical assessment is:   [x] Hours to Days    [] Days to Weeks    [] Other:    Communicated plan of care with: Hospice Case Manager; Hospice IDT; Care Team     GOALS OF CARE     Patient/Medical POA stated Goal of Care: comfort    [] I have reviewed and/or updated ACP information in the Advance Care Planning Navigator. This information is available in the [facility] [address] link in the patient's chart header.     Primary Decision Maker (Health Care Agent):   Primary Decision Maker: Phyllis murdock - Daughter - [phone]    Resuscitation Status: DNR  If DNR is there a Durable DNR on file? : [] Yes [] No (If no, complete Durable DNR)    HISTORY     History obtained from: Chart review, hospice Nurse    CHIEF COMPLAINT: N/A  The patient is:   [] Verbal  [x] Nonverbal  [] Unresponsive    HPI/SUBJECTIVE:  Pt is awake, verbal, pleasant, no apparent distress and no complaints at this time other than feeling ride from [facility] to [facility] being rough. 2/10; pt seen in rounds, has declined much since here, more lethargic, difficult to arouse, moans and frowns with pain intermittently, not eating, difficulty with swallowing, meds being crushed and given in apple sauce; difficult to give. Received 1 prn Norco at 5 am today.       REVIEW OF SYSTEMS     The following systems were: [x] reviewed  [x] unable to be reviewed    Positive ROS include:  Constitutional: fatigue, weakness, in pain, short of breath  Ears/nose/mouth/throat: increased airway secretions  Respiratory:shortness of breath, wheezing  Gastrointestinal:poor appetite, nausea, vomiting, abdominal pain, constipation, diarrhea  Musculoskeletal:pain, deformities, swelling legs  Neurologic:confusion, hallucinations, weakness  Psychiatric:anxiety, feeling depressed, poor sleep  Endocrine:     Adult Non-Verbal Pain Assessment Score: 4    Face  [] 0   No particular expression or smile  [x] 1   Occasional grimace, tearing, frowning, wrinkled forehead  [] 2   Frequent grimace, tearing, frowning, wrinkled forehead    Activity (movement)  [x] 0   Lying quietly, normal position  [] 1   Seeking attention through movement or slow, cautious movement  [] 2   Restless, excessive activity and/or withdrawal reflexes    Guarding  [] 0   Lying quietly, no positioning of hands over areas of body  [x] 1   Splinting areas of the body, tense  [] 2   Rigid, stiff    Physiology (vital signs)  [] 0   Stable vital signs  [x] 1   Change in any of the following: SBP > 20mm Hg; HR > 20/minute  [] 2   Change in any of the following: SBP > 30mm Hg; HR > 25/minute    Respiratory  [] 0   Baseline RR/SpO2, compliant with ventilator  [x] 1   RR > 10 above baseline, or 5% drop SpO2, mild asynchrony with ventilator  [] 2   RR > 20 above baseline, or 10% drop SpO2, asynchrony with ventilator     FUNCTIONAL ASSESSMENT     Palliative Performance Scale (PPS): 20%       PSYCHOSOCIAL/SPIRITUAL ASSESSMENT     Active Problems:    * No active hospital problems. *    Past Medical History:   Diagnosis Date    Anemia     Back pain     Bilateral hearing loss 1/29/2019    H/O: hysterectomy 1/29/2019    Hair loss     History of double vision     Hx of diarrhea     Hx of hemorrhoids     Hypertension     Incontinence of urine     Joint pain     Joint swelling     Memory loss or impairment 1/29/2019    Muscle ache     Muscle weakness     Pernicious anemia 1/28/2019    Pituitary cyst ([address] 1/29/2019    Resection [id number] and again in 2013 benign    PUD (peptic ulcer disease) 1/29/2019    Surgery for duodenum ?  Ulcer in 2009    Sleep difficulties     Stiffness in joint     Vision blurring       Past Surgical History:   Procedure Laterality Date    IR INJ SPINE FLUORO GUIDED  1/5/2021      Social History     Tobacco Use    Smoking status: Never Smoker    Smokeless tobacco: Never Used   Substance Use Topics    Alcohol use: Yes     Comment: Rare     Family History   Problem Relation Age of Onset    Other Mother     Cancer Father     Heart Disease Father       Allergies   Allergen Reactions    Penicillins Hives    Percocet [Oxycodone-Acetaminophen] Other (comments)     Agitation    Sulfa (Sulfonamide Antibiotics) Hives      Current Facility-Administered Medications   Medication Dose Route Frequency    ciprofloxacin HCl (CIPRO) tablet 250 mg  250 mg Oral Q12H    bisacodyL (DULCOLAX) suppository 10 mg  10 mg Rectal DAILY PRN    senna-docusate (PERICOLACE) 8.6-50 mg per tablet 1 Tablet  1 Tablet Oral DAILY    bisacodyL (DULCOLAX) suppository 10 mg  10 mg Rectal DAILY PRN    acetaminophen (TYLENOL) tablet 650 mg  650 mg Oral Q6H PRN    Or    acetaminophen (TYLENOL) suppository 650 mg  650 mg Rectal Q6H PRN    ondansetron (ZOFRAN ODT) tablet 4 mg  4 mg Oral Q6H PRN    amLODIPine (NORVASC) tablet 10 mg (Patient Supplied)  10 mg Oral DAILY    gabapentin (NEURONTIN) capsule 300 mg  300 mg Oral BID    HYDROcodone-acetaminophen (NORCO) 5-325 mg per tablet 1 Tablet  1 Tablet Oral Q6H PRN    Or    HYDROcodone-acetaminophen (NORCO) 5-325 mg per tablet 0.5 Tablet  0.5 Tablet Oral Q6H PRN    metoprolol tartrate (LOPRESSOR) tablet 12.5 mg  12.5 mg Oral BID    pantoprazole (PROTONIX) tablet 40 mg  40 mg Oral ACB    valsartan (DIOVAN) tablet 160 mg (Patient Supplied)  160 mg Oral DAILY    diclofenac (VOLTAREN) 1 % topical gel 4 g (Patient Supplied)  4 g Topical QID PRN        PHYSICAL EXAM     Wt Readings from Last 3 Encounters:   02/08/22 54 kg (119 lb 0.8 oz)   02/04/22 54.9 kg (121 lb)   02/02/22 54.9 kg (121 lb)       Visit Vitals  BP (!) 147/83   Pulse 86   Temp 97.5 °F (36.4 °C)   Resp 28   Ht 5' 3\" (1.6 m)   Wt 54 kg (119 lb 0.8 oz)   SpO2 96%   BMI 21.09 kg/m²       Supplemental O2  [x] Yes  [] NO  Last bowel movement:     Currently this patient has:  [] Peripheral IV [] PICC  [] PORT [] ICD    [x] Allan Catheter [] NG Tube   [] PEG Tube    [] Rectal Tube [] Drain  [] Other:     Constitutional: lethargic, difficult to arouse, non verbal, frowned in distress/pain, appears listless  Eyes: pallor  ENMT: dry mucous membranes  Cardiovascular: normal rate/rhythm, soft systolic murmur 3/6 on precordium, soft edema hands, legs/feet  Respiratory: slightly labored shallow breathing  Gastrointestinal: soft, non tender, no masses, bowel sounds good  Musculoskeletal: no deformities  Skin:scattered bruising  Neurologic: lethargic, minimally responsive, listless  Psychiatric: calm  Other:       Pertinent Lab and or Imaging Tests:  Lab Results   Component Value Date/Time    Sodium 135 (L) 02/08/2022 05:15 AM    Potassium 3.6 02/08/2022 05:15 AM    Chloride 101 02/08/2022 05:15 AM    CO2 31 02/08/2022 05:15 AM    Anion gap 3 (L) 02/08/2022 05:15 AM    Glucose 97 02/08/2022 05:15 AM    BUN 7 02/08/2022 05:15 AM    Creatinine 0.41 (L) 02/08/2022 05:15 AM    BUN/Creatinine ratio 17 02/08/2022 05:15 AM    GFR est AA >60 02/08/2022 05:15 AM    GFR est non-AA >60 02/08/2022 05:15 AM    Calcium 8.5 02/08/2022 05:15 AM     Lab Results   Component Value Date/Time    Protein, total 7.7 02/04/2022 05:17 PM    Albumin 3.3 (L) 02/04/2022 05:17 PM           Total time: 35 mts  Counseling / coordination time:   > 50% counseling / coordination?:

## 2022-02-10 NOTE — [facility] IDG OTHER NOTES
HOSPICE IDG -- BEREAVEMENT  During the interdisciplinary group meeting on 2/10/2022, the primary care team reviewed the patient's admission, and bereavement was discussed. It was confirmed that daughter Arleta Mcardle is the primary bereaved at low risk level due to no significant risk factors. It was noted: patient's other daughter Usha Carias is just starting treatment for stage 4 cancer.

## 2022-02-10 NOTE — PROGRESS NOTES
Problem: Hospice Orientation  Goal: Demonstrate understanding of hospice philosophy, plan of care, and home hospice program  Description: The patient/family/caregiver will demonstrate understanding of hospice philosophy, plan of care and the home hospice program as evidenced by participation in meeting the patient's psychosocial, spiritual, medical, and physical needs inclusive of medical supplies/equipment focusing on symptoms. Outcome: Progressing Towards Goal     Problem: Potential for Alteration in Skin Integrity  Goal: Monitor skin for areas of alteration in skin integrity  Description: Patient/family/caregiver will demonstrate ability to care for patient's skin, monitor for areas of breakdown, and demonstrate methods to prevent breakdown during hospice care. Outcome: Not Progressing Towards Goal     Problem: Risk for Falls  Goal: Free of falls during inpatient stay  Description: Patient will be free of falls during inpatient stay. Outcome: Not Progressing Towards Goal     Problem: Alteration in Mobility  Goal: Remain as independent as possible and remain safe in environment  Description: Patient will remain as independent as possible and remain safe in their environment. Outcome: Progressing Towards Goal     Problem: Pain  Goal: Assess satisfaction of level of comfort and symptom control  Outcome: Progressing Towards Goal  Goal: *Control of acute pain  Outcome: Progressing Towards Goal     Problem: Anticipatory Grief  Goal: Explore reactions to and verbalize acceptance of impending loss  Description: Patient/family/caregiver will explore reactions to and verbalize acceptance of impending loss. Outcome: Progressing Towards Goal     Problem: Anxiety/Agitation  Goal: Verbalize or staff assess the ability to manage anxiety  Description: The patient/family/caregiver will verbalize and demonstrate ability to manage the patient's anxiety throughout hospice care.   Outcome: Progressing Towards Goal     Problem: Communication Deficit  Goal: Effectively communicate symptoms, needs, and concerns  Description: Patient/family/caregiver will effectively communicate symptoms, needs and concerns. Outcome: Progressing Towards Goal     Problem: Coping and Emotional Distress  Goal: Demonstrate acceptance of terminal illness and understanding of disease progression  Description: Patient/family/caregiver will demonstrate acceptance of terminal disease and understanding of disease progression while employing appropriate coping mechanisms. Outcome: Progressing Towards Goal     Problem: Depression  Goal: Verbalize and demonstrate ability to manage depression  Description: The patient/family/caregiver will verbalize and demonstrate ability to manage patient's depression throughout hospice care. Outcome: Progressing Towards Goal     Problem: Spiritual Distress  Goal: Distress heard, acknowledged, and addressed  Description: Patient/family/caregiver distress will be heard, acknowledged, and addressed throughout hospice care.   Outcome: Progressing Towards Goal     Problem: Pressure Injury - Risk of  Goal: *Prevention of pressure injury  Outcome: Progressing Towards Goal  Note: Pressure Injury Interventions:  Sensory Interventions: Assess changes in LOC    Moisture Interventions: Absorbent underpads    Activity Interventions: Pressure redistribution bed/mattress(bed type)    Mobility Interventions: HOB 30 degrees or less    Nutrition Interventions: Offer support with meals,snacks and hydration    Friction and Shear Interventions: Feet elevated on foot rest

## 2022-02-10 NOTE — PROGRESS NOTES
Problem: Risk for Falls  Goal: Free of falls during inpatient stay  Description: Patient will be free of falls during inpatient stay.   Outcome: Progressing Towards Goal     Problem: Pain  Goal: *Control of acute pain  Outcome: Progressing Towards Goal

## 2022-02-11 NOTE — PROGRESS NOTES
0700  Report received from [place] [address]  Patient unresponsive, labored respirations noted with use of accessory muscles. Family at bedside. 0730  PRN Dilaudid and Lorazepam administered. 0800  Continued abdominal breathing noted. RR 12, labored. Call to Dr. Alva Brand. New orders received for 1 mg Dilaudid and Lorazepam every 4 hours scheduled and every 15 minutes as needed. 0830  Scheduled Lorazepam and Dilaudid administered. Daughter at bedside. Assessment complete, see flowsheet. 0930  Work of breathing improved, neutral facial expression noted. 56  Daughter at bedside listening to hymns. Patient's respirations slow, irregular, unlabored. No facial grimacing. 1115  Repositioned patient to left side. Patient remains unresponsive, respirations slow, irregular, unlabored. Neutral facial expression. Patient's facial coloring pale and dusky. 1132  Patient pronounced following two minutes of auscultation with no respirations or heart sounds noted. Daughter and son-in-law at bedside tearful but grieving appropriately.

## 2022-02-11 NOTE — PROGRESS NOTES
[facility]  [phone]    Patient Name: Donovan Gonzalez  YOB: 1926    Date of Provider Hospice Visit: 02/11/22    Level of Care:   [x] General Inpatient (GIP)    [] Routine   [] Respite    Current Location of Care:  [] [facility] [] [facility] [] [address] [] [facility] [x] [facility]    IF [facility], patient referred from:  [] [facility] [x] [facility] [] [address] [facility] [] [facility] [] Home [] Other:     Date of Original Hospice Admission: 02/08/22  Hospice Medical Director at time of admission: [address] [place] [address] Diagnosis: Sepsis with metabolic encephalopathy  Diagnoses RELATED to the terminal prognosis: Dementia, Chronic pain, Trigeminal Neuralgia  Other Diagnoses: Pituitary Cyst: incidental on MRI and chronic, HTN        HOSPICE SUMMARY   Do not cut and paste chart information other than imaging findings    Donovan Gonzalez is a 80y.o. year old who was admitted to [facility] with principle diagnosis of sepsis that has resulted in metabolic encephalopathy. Pt recently admitted to hospital with worsening fatigue, weakness, confusion and inability to thrive. On evaluation found to be septic, possible UTI/pneumonia although no organism identified, treated with IV ceftriaxone initially and now on oral antibiotics to cover for atypical pneumonia. Pt has declined in function and is more weak, frail, deconditioned, requiring more supportive care with ADL's, limited appetite and intake. 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 Pt also has chronic pain from trigeminal neuralgia. Currently PPS 30%  Refer to LCD        The patient/family chose comfort measures with the support of Hospice. HOSPICE DIAGNOSES   Active Symptoms:  1. Chronic pain; nonverbal pain  2. Decreased responsiveness  3  Encephalopathy: worsened  4. Fatigue/weakness/Frailty/debility: worse  5. Inability to swallow  6. Shortness of breath  7. Increased secretions  8. Fever  9. Hospice care     PLAN   1. Continue GIP as pt is requiring more close nursing assessment, monitoring for symptoms, and continues to need more aggressive medical management. 2. Increase Dilaudid to 1mg IV scheduled every 4 hours and every 15mts as needed  3. Increase Ativan to 1mg IV scheduled every 4 hours and every 15mts as needed  4. Other comfort meds as needed  5. Continue Allan's catheter care  6. O2 2 l NC for comfort  7. Family prepared and grieving appropriately    8.  and SW to support family needs  9. Disposition:likely will pass here  10. Hospice Plan of care was reviewed in detail and agree with current plan of care    Prognosis estimated based on 02/11/22 clinical assessment is:   [x] Hours to Days    [] Days to Weeks    [] Other:    Communicated plan of care with: Hospice Case Manager; Hospice IDT; Care Team     GOALS OF CARE     Patient/Medical POA stated Goal of Care: comfort    [] I have reviewed and/or updated ACP information in the Advance Care Planning Navigator. This information is available in the [address] [place] [address] Drive link in the patient's chart header. Primary Decision Maker (Health Care Agent):   Primary Decision Maker: Phyllis murdock - Daughter - [phone]    Resuscitation Status: DNR  If DNR is there a Durable DNR on file? : [] Yes [] No (If no, complete Durable DNR)    HISTORY     History obtained from: Chart review, hospice Nurse    CHIEF COMPLAINT: N/A  The patient is:   [] Verbal  [x] Nonverbal  [x] Unresponsive    HPI/SUBJECTIVE:  Pt is awake, verbal, pleasant, no apparent distress and no complaints at this time other than feeling ride from [facility] to [facility] being rough.      2/10; pt seen in rounds, has declined much since here, more lethargic, difficult to arouse, moans and frowns with pain intermittently, not eating, difficulty with swallowing, meds being crushed and given in apple sauce; difficult to give. Received 1 prn [place] at 5 am today. 2/11' pt unresponsive, lot more restless and in pain; moans and grimaces. Has fever. Got 5 doses of prn dilaudid and ativan overnight. REVIEW OF SYSTEMS     The following systems were: [] reviewed  [x] unable to be reviewed    Adult Non-Verbal Pain Assessment Score: 7    Face  [] 0   No particular expression or smile  [] 1   Occasional grimace, tearing, frowning, wrinkled forehead  [x] 2   Frequent grimace, tearing, frowning, wrinkled forehead    Activity (movement)  [] 0   Lying quietly, normal position  [x] 1   Seeking attention through movement or slow, cautious movement  [] 2   Restless, excessive activity and/or withdrawal reflexes    Guarding  [] 0   Lying quietly, no positioning of hands over areas of body  [x] 1   Splinting areas of the body, tense  [] 2   Rigid, stiff    Physiology (vital signs)  [] 0   Stable vital signs  [] 1   Change in any of the following: SBP > 20mm Hg; HR > 20/minute  [x] 2   Change in any of the following: SBP > 30mm Hg; HR > 25/minute    Respiratory  [] 0   Baseline RR/SpO2, compliant with ventilator  [x] 1   RR > 10 above baseline, or 5% drop SpO2, mild asynchrony with ventilator  [] 2   RR > 20 above baseline, or 10% drop SpO2, asynchrony with ventilator     FUNCTIONAL ASSESSMENT     Palliative Performance Scale (PPS): 10%       PSYCHOSOCIAL/SPIRITUAL ASSESSMENT     Active Problems:    * No active hospital problems.  *    Past Medical History:   Diagnosis Date    Anemia     Back pain     Bilateral hearing loss 1/29/2019    H/O: hysterectomy 1/29/2019    Hair loss     History of double vision     Hx of diarrhea     Hx of hemorrhoids     Hypertension     Incontinence of urine     Joint pain     Joint swelling     Memory loss or impairment 1/29/2019    Muscle ache     Muscle weakness     Pernicious anemia 1/28/2019    Pituitary cyst ([facility] [address] 1/29/2019    Resection [id number] and again in 2013 benign    PUD (peptic ulcer disease) 1/29/2019    Surgery for duodenum ?  Ulcer in 2009    Sleep difficulties     Stiffness in joint     Vision blurring       Past Surgical History:   Procedure Laterality Date    IR INJ SPINE FLUORO GUIDED  1/5/2021      Social History     Tobacco Use    Smoking status: Never Smoker    Smokeless tobacco: Never Used   Substance Use Topics    Alcohol use: Yes     Comment: Rare     Family History   Problem Relation Age of Onset    Other Mother     Cancer Father     Heart Disease Father       Allergies   Allergen Reactions    Penicillins Hives    Percocet [Oxycodone-Acetaminophen] Other (comments)     Agitation    Sulfa (Sulfonamide Antibiotics) Hives      Current Facility-Administered Medications   Medication Dose Route Frequency    HYDROmorphone (DILAUDID) injection 1 mg  1 mg IntraVENous Q15MIN PRN    LORazepam (ATIVAN) injection 1 mg  1 mg IntraVENous Q15MIN PRN    HYDROmorphone (DILAUDID) injection 1 mg  1 mg IntraVENous Q4H    LORazepam (ATIVAN) injection 1 mg  1 mg IntraVENous Q4H    glycopyrrolate (ROBINUL) injection 0.2 mg  0.2 mg IntraVENous Q4H PRN    ondansetron (ZOFRAN) injection 4 mg  4 mg IntraVENous Q4H PRN    ketorolac (TORADOL) injection 15 mg  15 mg IntraVENous Q6H PRN    bisacodyL (DULCOLAX) suppository 10 mg  10 mg Rectal DAILY PRN    bisacodyL (DULCOLAX) suppository 10 mg  10 mg Rectal DAILY PRN    acetaminophen (TYLENOL) suppository 650 mg  650 mg Rectal Q6H PRN    diclofenac (VOLTAREN) 1 % topical gel 4 g (Patient Supplied)  4 g Topical QID PRN        PHYSICAL EXAM     Wt Readings from Last 3 Encounters:   02/08/22 54 kg (119 lb 0.8 oz)   02/04/22 54.9 kg (121 lb)   02/02/22 54.9 kg (121 lb)       Visit Vitals  BP (!) 69/38   Pulse (!) 120   Temp 99.1 °F (37.3 °C)   Resp 12   Ht 5' 3\" (1.6 m)   Wt 54 kg (119 lb 0.8 oz)   SpO2 (!) 38%   BMI 21.09 kg/m²       Supplemental O2  [x] Yes  [] NO  Last bowel movement: Currently this patient has:  [] Peripheral IV [] PICC  [] PORT [] ICD    [x] Allan Catheter [] NG Tube   [] PEG Tube    [] Rectal Tube [] Drain  [] Other:     Constitutional: lethargic, unresponsive, appears in distress/pain, appears listless  Eyes: pallor  ENMT: some secretions  Cardiovascular: tachycardia++, soft systolic murmur 3/6 on precordium, soft edema hands, legs/feet  Respiratory: slightly labored shallow breathing  Gastrointestinal: soft, non tender, no masses, bowel sounds good  Musculoskeletal: no deformities, painful movements  Skin:scattered bruising  Neurologic: lethargic, unresponsive, listless  Psychiatric:   Other:       Pertinent Lab and or Imaging Tests:  Lab Results   Component Value Date/Time    Sodium 135 (L) 02/08/2022 05:15 AM    Potassium 3.6 02/08/2022 05:15 AM    Chloride 101 02/08/2022 05:15 AM    CO2 31 02/08/2022 05:15 AM    Anion gap 3 (L) 02/08/2022 05:15 AM    Glucose 97 02/08/2022 05:15 AM    BUN 7 02/08/2022 05:15 AM    Creatinine 0.41 (L) 02/08/2022 05:15 AM    BUN/Creatinine ratio 17 02/08/2022 05:15 AM    GFR est AA >60 02/08/2022 05:15 AM    GFR est non-AA >60 02/08/2022 05:15 AM    Calcium 8.5 02/08/2022 05:15 AM     Lab Results   Component Value Date/Time    Protein, total 7.7 02/04/2022 05:17 PM    Albumin 3.3 (L) 02/04/2022 05:17 PM           Total time: 35 mts  Counseling / coordination time:   > 50% counseling / coordination?:

## 2022-02-11 NOTE — PROGRESS NOTES
Problem: Potential for Alteration in Skin Integrity  Goal: Monitor skin for areas of alteration in skin integrity  Description: Patient/family/caregiver will demonstrate ability to care for patient's skin, monitor for areas of breakdown, and demonstrate methods to prevent breakdown during hospice care.   Outcome: Progressing Towards Goal     Problem: Pain  Goal: *Control of acute pain  Outcome: Progressing Towards Goal

## 2022-02-11 NOTE — HOSPICE
1900 received report from [employer]. 1910 Patient is sleeping with relaxed face with [address] daughters at bedside. Please see flow sheet for assessment. 2010 Patient is sleeping with no facial grimacing. She woke up answered nurses questions states she is not in pain. 2130 Patient is sleeping turned to left side. 2215 Patient is sleeping with no facial grimacing.  2300 Patient's brow is furrowed and she moaned, gave prn dose of Dilaudid 0.5 mg and Ativan 0.5 mg.  2330 Patient is sleeping with relaxed face. 0025  Patient is snoring with relaxed face. 0130 Patient is sleeping with no facial grimacing.  0215 Patient is sleeping with respirations of 14.  0315 Patient is moaning out and is having problems clearing secretions. Gave prn dose of Dilaudid 0.5mg, Ativan 0.5 mg , and Robinul 0.2 mg for secretions. 0330 Patient is moaning, gave prn dose of Dilaudid 0.5 mg and Ativan 0.5 mg.  0400 Patient is sleeping with relaxed face and snoring. 0500 Patient is abdominal breathing with respirations of 10. Patient has a relaxed face. NAME OF PATIENT:  Satya Hollins    LEVEL OF CARE:  GIP    REASON FOR GIP:   Pain, despite numerous changes in medications    *PATIENT REMAINS ELIGIBLE FOR GIP LEVEL OF CARE AS EVIDENCED BY: (MUST BE ADDRESSED OF PATIENT GIP) Patient is receiving IV medications and needs frequent nursing assessments.       REASON FOR RESPITE:  NA    O2 SAFETY:  Tanks stored in [place] , No petroleum based products on face while oxygen in use and Oxygen sign on the door    FALL INTERVENTIONS PROVIDED:   Implemented/recommended use of fall risk identification flag to all team members, Implemented/recommended assistive devices and encouraged their use, Implemented/recommended resources for alarm system (personal alarm, bed alarm, call bell, etc.) , Implemented/recommended environmental changes (remove hazards, lower bed, improve lighting, etc.) and Implemented/recommended increased supervision/assistance    INTERDISPLINARY COMMUNICATION/COLLABORATION:  Physician and RN, CNA    NEW MEDICATION INITIATION DOCUMENTATION:  NA    Reason medication is being initiated:  NA    MD / Provider name consulted re: change in status / initiation of new medication:  NA    New Symptom(s):  NA    New Order(s):  NA    Name of the person notified of the changes:  NA    Name of person being taught:  NA    Instructions given:  NA    Side Effects taught:  NA    Response to teaching:  NA      COMFORTABLE DYING MEASURE:  Is Patient/family satisfied with symptom level?  yes    DISCHARGE PLAN:  End of life vs home

## 2022-02-11 NOTE — PROGRESS NOTES
Physician Progress Note      [facility] #:                  [id number]  :                       1926  ADMIT DATE:       2022 12:48 PM  Ruperto Moralez DATE:        2022 2:01 PM  RESPONDING  PROVIDER #:        Bhumika BUSTAMANTE DO          QUERY TEXT:    Patient admitted with Sepsis , noted to have abnormal serum potassium levels treated with oral medication  . If possible, please document in progress notes and discharge summary if you are evaluating and/or treating any of the following: The medical record reflects the following:  Risk Factors: decreased intake  Clinical Indicators:  hypovolemia    2022 10:00  Potassium: 3.3 (L)    2022 03:14  Potassium: 3.7    2022 04:30  Potassium: 3.2 (L)      Treatment: potassium bicarb-citric acid (EFFER-K) tablet 20 mEq, lab monitoring    Thank you,  Patricia Ramey RN, CDI, [place], [facility]  Certified Clinical   [phone]  Options provided:  -- hypokalemia  -- serum potassium not clinically significant  -- Other - I will add my own diagnosis  -- Disagree - Not applicable / Not valid  -- Disagree - Clinically unable to determine / Unknown  -- Refer to Clinical Documentation Reviewer    PROVIDER RESPONSE TEXT:    This patient has hypokalemia.     Query created by: Christo Santo on 2/10/2022 10:13 AM      Electronically signed by:  Yossi Day DO 2/10/2022 7:39 PM

## 2022-02-11 NOTE — PROGRESS NOTES
Music Therapy Assessment  [facility]    Rama Loera [id number]     7/9/1926  80 y.o.  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    Pituitary cyst ([facility] [address] 01/29/2019    PUD (peptic ulcer disease) 01/29/2019    Memory loss or impairment 01/29/2019    H/O: hysterectomy 01/29/2019    Bilateral hearing loss 01/29/2019    HTN, goal below 130/80 01/28/2019    Pernicious anemia 01/28/2019    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    Closed nondisplaced fracture of lateral malleolus of right fibula 04/30/2018        Date: 2/11/2022            Total Time (in minutes): 30          [address]   Visit: 2/10/2022    Mental Status:   [  ] Alert [  ] Gypsy Eth [  ]  Confused  Yehudit.Meth  ] Minimally responsive  Yehudit.Meth  ] Sleeping    Communication Status: [  ] Impaired Speech [ X ] Nonverbal     Physical Status:   [  ] Oxygen in use  Yehudit.Meth  ] Hard of Hearing [  ] Vision Impaired  [  ] Ambulatory  [  ] Ambulatory with assistance [  ] Non-ambulatory     Music Preferences, Background: Family present, requested hymns and meaningful song    Clinical Problem addressed: _Comfort at end of life;    Goal(s) met in session:  Physical/Pain management (Scale of 1-10):  No observable signs of distress; appeared comfortable, eyes closed, breathing evenly;    Social:  [  ] Decreased feelings of isolation/loneliness [  ] Positive social interaction   Yehudit.Meth  ] Provided support and/or comfort for family/friends    Spiritual:  [ X ] Spiritual support                                                    [  ] Expressed peace  [ X ] Expressed denilson (daughter, granddaughter)    [  ] Discussed beliefs    Techniques Utilized (Check all that apply):   [  ] Procedural support MT [  ] Music for relaxation             [ X ] Patient preferred music  [  ] Luly analysis  [  ] Suzi Garre choice              [  ] Music for validation  [  ] Entrainment  [  ] Movement to music             [  ] Guided visualization  [  ] Oswaldo Virk  [  ] Patient instrument playing             [  ] Suzi Garre writing  [  ] Viona [place] along   [  ] Adrien Dye              [  ] Sensory stimulation  [  ] Active Listening  Stacia.Ales  ] Music for spiritual support [  ] Making of CDs as gifts    Session Observations:  Mrs. Yasmeen Bentley was visited at bedside, with her daughter and granddaughter present and holding her hand during the visit. After brief discussion on preferences, several hymns were chosen. The music brought on some tearfulness for family, and patient and family have been engaged in singing and choral activities with Jew and community throughout their lives, holding meaning, comfort and culture for them. Conversation around [employer], heritage, and current family member's illness allowed for this Johan Cui to provide support and consolation, verbally and through music. The session was closed with a favorite of Mrs. Karla Velasco, [place] Foot Two, Eyes of Blue\" and then a sung blessing, \"The Hand of God\" which offers prayers and reassurance of God's love. Thank you for the opportunity to provide Music Therapy to Mrs. Hollins and her family.   Butch Mayer, [facility] [place]   Music [address] Certified  Spiritual Care Services  Referral- based service

## 2022-02-11 NOTE — PROGRESS NOTES
Problem: Hospice Orientation  Goal: Demonstrate understanding of hospice philosophy, plan of care, and home hospice program  Description: The patient/family/caregiver will demonstrate understanding of hospice philosophy, plan of care and the home hospice program as evidenced by participation in meeting the patient's psychosocial, spiritual, medical, and physical needs inclusive of medical supplies/equipment focusing on symptoms. Outcome: Progressing Towards Goal     Problem: Potential for Alteration in Skin Integrity  Goal: Monitor skin for areas of alteration in skin integrity  Description: Patient/family/caregiver will demonstrate ability to care for patient's skin, monitor for areas of breakdown, and demonstrate methods to prevent breakdown during hospice care. Outcome: Progressing Towards Goal     Problem: Risk for Falls  Goal: Free of falls during inpatient stay  Description: Patient will be free of falls during inpatient stay. Outcome: Progressing Towards Goal     Problem: Alteration in Mobility  Goal: Remain as independent as possible and remain safe in environment  Description: Patient will remain as independent as possible and remain safe in their environment. Outcome: Progressing Towards Goal     Problem: Pain  Goal: Assess satisfaction of level of comfort and symptom control  Outcome: Progressing Towards Goal  Goal: *Control of acute pain  Outcome: Progressing Towards Goal     Problem: Anticipatory Grief  Goal: Explore reactions to and verbalize acceptance of impending loss  Description: Patient/family/caregiver will explore reactions to and verbalize acceptance of impending loss. Outcome: Progressing Towards Goal     Problem: Anxiety/Agitation  Goal: Verbalize or staff assess the ability to manage anxiety  Description: The patient/family/caregiver will verbalize and demonstrate ability to manage the patient's anxiety throughout hospice care.   Outcome: Progressing Towards Goal     Problem: Communication Deficit  Goal: Effectively communicate symptoms, needs, and concerns  Description: Patient/family/caregiver will effectively communicate symptoms, needs and concerns. Outcome: Progressing Towards Goal     Problem: Coping and Emotional Distress  Goal: Demonstrate acceptance of terminal illness and understanding of disease progression  Description: Patient/family/caregiver will demonstrate acceptance of terminal disease and understanding of disease progression while employing appropriate coping mechanisms. Outcome: Progressing Towards Goal     Problem: Depression  Goal: Verbalize and demonstrate ability to manage depression  Description: The patient/family/caregiver will verbalize and demonstrate ability to manage patient's depression throughout hospice care. Outcome: Progressing Towards Goal     Problem: Spiritual Distress  Goal: Distress heard, acknowledged, and addressed  Description: Patient/family/caregiver distress will be heard, acknowledged, and addressed throughout hospice care.   Outcome: Progressing Towards Goal     Problem: Pressure Injury - Risk of  Goal: *Prevention of pressure injury  Outcome: Progressing Towards Goal  Note: Pressure Injury Interventions:  Sensory Interventions: Assess changes in LOC    Moisture Interventions: Absorbent underpads    Activity Interventions: Pressure redistribution bed/mattress(bed type)    Mobility Interventions: HOB 30 degrees or less    Nutrition Interventions: Document food/fluid/supplement intake    Friction and Shear Interventions: Apply protective barrier, creams and emollients                Problem: Grieving  Goal: *Able to express feelings of grief  Outcome: Progressing Towards Goal  Goal: *Able to identify stages of grieving process  Outcome: Progressing Towards Goal     Problem: Infection - Risk of, Central Venous Catheter-Associated Bloodstream Infection  Goal: *Absence of infection signs and symptoms  Outcome: Progressing Towards Goal Problem: Infection - Risk of, Urinary Catheter-Associated Urinary Tract Infection  Goal: *Absence of infection signs and symptoms  Outcome: Progressing Towards Goal     Problem: Falls - Risk of  Goal: *Absence of Falls  Description: Document Roger Fall Risk and appropriate interventions in the flowsheet. Outcome: Progressing Towards Goal  Note: Fall Risk Interventions:  Mobility Interventions: Bed/chair exit alarm    Mentation Interventions: Bed/chair exit alarm    Medication Interventions: Bed/chair exit alarm    Elimination Interventions: Bed/chair exit alarm              Problem: Patient Education: Go to Patient Education Activity  Goal: Patient/Family Education  Outcome: Progressing Towards Goal     Problem: Pressure Injury - Risk of  Goal: *Prevention of pressure injury  Description: Document Jh Scale and appropriate interventions in the flowsheet.   Outcome: Progressing Towards Goal  Note: Pressure Injury Interventions:  Sensory Interventions: Assess changes in LOC    Moisture Interventions: Absorbent underpads    Activity Interventions: Pressure redistribution bed/mattress(bed type)    Mobility Interventions: HOB 30 degrees or less    Nutrition Interventions: Document food/fluid/supplement intake    Friction and Shear Interventions: Apply protective barrier, creams and emollients                Problem: Patient Education: Go to Patient Education Activity  Goal: Patient/Family Education  Outcome: Progressing Towards Goal

## 2022-02-11 NOTE — PROGRESS NOTES
Problem: Hospice Orientation  Goal: Demonstrate understanding of hospice philosophy, plan of care, and home hospice program  Description: The patient/family/caregiver will demonstrate understanding of hospice philosophy, plan of care and the home hospice program as evidenced by participation in meeting the patient's psychosocial, spiritual, medical, and physical needs inclusive of medical supplies/equipment focusing on symptoms. Outcome: Resolved/Met     Problem: Potential for Alteration in Skin Integrity  Goal: Monitor skin for areas of alteration in skin integrity  Description: Patient/family/caregiver will demonstrate ability to care for patient's skin, monitor for areas of breakdown, and demonstrate methods to prevent breakdown during hospice care. 2/11/2022 1406 by Brittany Wilcox RN  Outcome: Resolved/Met  2/11/2022 1029 by Brittany Wilcox RN  Outcome: Progressing Towards Goal     Problem: Risk for Falls  Goal: Free of falls during inpatient stay  Description: Patient will be free of falls during inpatient stay. Outcome: Resolved/Met     Problem: Alteration in Mobility  Goal: Remain as independent as possible and remain safe in environment  Description: Patient will remain as independent as possible and remain safe in their environment. Outcome: Resolved/Met     Problem: Pain  Goal: Assess satisfaction of level of comfort and symptom control  Outcome: Resolved/Met  Goal: *Control of acute pain  2/11/2022 1406 by Brittany Wilcox RN  Outcome: Resolved/Met  2/11/2022 1029 by Brittany Wilcox RN  Outcome: Progressing Towards Goal     Problem: Anticipatory Grief  Goal: Explore reactions to and verbalize acceptance of impending loss  Description: Patient/family/caregiver will explore reactions to and verbalize acceptance of impending loss.   Outcome: Resolved/Met     Problem: Anxiety/Agitation  Goal: Verbalize or staff assess the ability to manage anxiety  Description: The patient/family/caregiver will verbalize and demonstrate ability to manage the patient's anxiety throughout hospice care. Outcome: Resolved/Met     Problem: Communication Deficit  Goal: Effectively communicate symptoms, needs, and concerns  Description: Patient/family/caregiver will effectively communicate symptoms, needs and concerns. Outcome: Resolved/Met     Problem: Coping and Emotional Distress  Goal: Demonstrate acceptance of terminal illness and understanding of disease progression  Description: Patient/family/caregiver will demonstrate acceptance of terminal disease and understanding of disease progression while employing appropriate coping mechanisms. Outcome: Resolved/Met     Problem: Depression  Goal: Verbalize and demonstrate ability to manage depression  Description: The patient/family/caregiver will verbalize and demonstrate ability to manage patient's depression throughout hospice care. Outcome: Resolved/Met     Problem: Spiritual Distress  Goal: Distress heard, acknowledged, and addressed  Description: Patient/family/caregiver distress will be heard, acknowledged, and addressed throughout hospice care. Outcome: Resolved/Met     Problem: Pressure Injury - Risk of  Goal: *Prevention of pressure injury  Outcome: Resolved/Met     Problem: Grieving  Goal: *Able to express feelings of grief  Outcome: Resolved/Met  Goal: *Able to identify stages of grieving process  Outcome: Resolved/Met     Problem: Infection - Risk of, Central Venous Catheter-Associated Bloodstream Infection  Goal: *Absence of infection signs and symptoms  Outcome: Resolved/Met     Problem: Infection - Risk of, Urinary Catheter-Associated Urinary Tract Infection  Goal: *Absence of infection signs and symptoms  Outcome: Resolved/Met     Problem: Falls - Risk of  Goal: *Absence of Falls  Description: Document Roger Fall Risk and appropriate interventions in the flowsheet.   Outcome: Resolved/Met     Problem: Patient Education: Go to Patient Education Activity  Goal: Patient/Family Education  Outcome: Resolved/Met     Problem: Pressure Injury - Risk of  Goal: *Prevention of pressure injury  Description: Document Jh Scale and appropriate interventions in the flowsheet.   Outcome: Resolved/Met     Problem: Patient Education: Go to Patient Education Activity  Goal: Patient/Family Education  Outcome: Resolved/Met

## 2022-02-12 ENCOUNTER — HOME CARE VISIT (OUTPATIENT)
Dept: HOSPICE | Facility: HOSPICE | Age: 87
End: 2022-02-12
Payer: MEDICARE

## 2022-02-13 NOTE — PROGRESS NOTES
Physician Progress Note      Lidia RODRIGUEZ #:                  [id number]  :                       [id number]  ADMIT DATE:       2022 12:48 PM  [address] DATE:        2022 2:01 PM  RESPONDING  PROVIDER #:        Harley BUSTAMANTE DO          QUERY TEXT:    Pt admitted with Sepsis   Pt noted to have atypical pneumonia  If possible, please document in the progress notes and discharge summary if you are evaluating and/or treating any of the following:    ]    Note: CAP and HCAP indicate where the pneumonia was acquired, not a specific type. The medical record reflects the following:  Risk Factors: abnormal CXR  Clinical Indicators:  DCS  Sepsis: Fever, tachycardia, leukocytosis. Concern for ? UTI but urine cx neg - possible abx given prior to cx. CXR w/ concern for atypical PNA;    CXR  IMPRESSION  Mild diffuse bilateral interstitial infiltrates, consider atypical viral pneumonia. Treatment: maxipime, vancomycin, zithromax , rocephin  Thank you,  Homa Padilla RN, [facility], [place], [facility]  Certified Clinical   [phone]  Options provided:  -- Viral pneumonia  -- Aspiration pneumonia  -- Gram negative pneumonia  -- Gram positive pneumonia  -- MRSA pneumonia  -- MSSA pneumonia  -- Bacterial pneumonia  -- Other - I will add my own diagnosis  -- Disagree - Not applicable / Not valid  -- Disagree - Clinically unable to determine / Unknown  -- Refer to Clinical Documentation Reviewer    PROVIDER RESPONSE TEXT:    This patient has aspiration pneumonia.     Query created by: Anna Umaña on 2022 11:09 AM      Electronically signed by:  Gretel Palacio DO 2022 12:51 PM

## 2025-06-04 NOTE — PROGRESS NOTES
Duplicate - prescription request refused.   Patient's daughter notified of preliminary urine culture results & PCP's advice to treat the early infection with a 7 day course of Macrobid.